# Patient Record
Sex: MALE | Race: OTHER | Employment: FULL TIME | URBAN - METROPOLITAN AREA
[De-identification: names, ages, dates, MRNs, and addresses within clinical notes are randomized per-mention and may not be internally consistent; named-entity substitution may affect disease eponyms.]

---

## 2017-01-24 NOTE — PROGRESS NOTES
1/24/2017  1:13 PM    Trudy Kiser is a 49year old male. Chief complaint(s): Patient presents with: Follow - Up  Diabetes  Lab Results    HPI:     Terrye Sensing primary complaint is regarding increase Cr levels.      Orin Holm a 51year old male pr Vaccine, No Preserv, 3YR +                          12/27/2016      Pneumococcal (Prevnar 13)                          12/19/2016      TDAP                  03/20/2015    Deferred                Date(s) Deferred    Influenza Vaccine, No Preserv, 3YR + constipation. Endocrine: Positive for polydipsia and polyuria. Musculoskeletal: Negative for back pain and neck pain. Skin: Negative for rash. Neurological: Negative for seizures and headaches. Psychiatric/Behavioral: Negative for depressed mood. encounter diagnosis)  Essential hypertension with goal blood pressure less than 140/90     HTN     MEDICATIONS:   Signed Prescriptions Disp Refills    furosemide (LASIX) 20 MG Oral Tab 30 tablet 0      Sig: Take 1 tablet (20 mg total) by mouth daily.

## 2017-02-07 NOTE — PROGRESS NOTES
2/7/2017  1:26 PM    Eleazar Alvarez is a 49year old male. Chief complaint(s): Patient presents with:  Lab Results    HPI:     Eleazar Alvarez primary complaint is regarding FRANKIE.      Mignon Theodore a 51year old male presents for follow up regarding FRANKIE and Administered    Influenza Vaccine, No Preserv, 3YR +                          12/27/2016      Pneumococcal (Prevnar 13)                          12/19/2016      TDAP                  03/20/2015    Deferred                Date(s) Deferred    Influenza Kathryn Beltran constipation. Musculoskeletal: Negative for back pain and neck pain. Skin: Negative for rash. Neurological: Negative for seizures and headaches. Psychiatric/Behavioral: Negative for depressed mood.        PHYSICAL EXAM:   Physical Exam    Constituti Panel (8) [E]  RECOMMENDATIONS given include: avoid pseudoephedrine or other stimulants/decongestants in common cold remedies, decrease consumption of alcohol, perform routine monitoring of blood pressure with home blood pressure cuff, exercise, reduction

## 2017-04-15 NOTE — PROGRESS NOTES
4/15/2017  12:52 PM    Kasey Arias is a 49year old male. Chief complaint(s): Patient presents with:  Diabetes: Recommended F/U. Had recent labs done 03/18/17. Epistaxis: c/o intermittent nose bleed for the past 4 weeks.  Bleeding in small amount last History   Problem Relation Age of Onset   • Diabetes Father      type 2   • Hypertension Father    • Lipids Mother      hyperlipidemia   • Prostate Cancer Maternal Grandfather    • Diabetes Maternal Grandmother      type 2      Social History:   Smoking St ACCU-CHEK BERNY PLUS In Vitro Strip  Disp:  Rfl: 12   Accu-Chek Softclix Lancets Does not apply Misc  Disp:  Rfl: 12       Allergies:  No Known Allergies      ROS:   Review of Systems   Constitutional: Negative for fever, chills and fatigue.    HENT: Sneha Kebede 3. 3-5.1 mmol/L   Chloride 111 (H)  mmol/L   CO2 26 22-32 mmol/L   BUN 44 (H) 8-20 mg/dL   Creatinine 1.88 (H) 0.50-1.50 mg/dL   Calcium, Total 9.2 8.5-10.5 mg/dL   BUN/CREA Ratio 23.4 (H) 10.0-20.0   Anion Gap 6 0-18   Calculated Osmolality 310 (H) 2 Monitoring Suppl (ACCU-CHEK BERNY PLUS) W/DEVICE Does not apply Kit, , Disp: , Rfl: 0  •  ACCU-CHEK BERNY PLUS In Vitro Strip, , Disp: , Rfl: 12  •  Accu-Chek Softclix Lancets Does not apply Misc, , Disp: , Rfl: 12.   No prescriptions requested or ordered i monitoring of blood pressure with home blood pressure cuff, exercise, reduction of dietary salt intake, take medication as prescribed, try not to miss doses, smoking cessation, weight loss, and stress reduction.     FOLLOW-UP: Schedule a follow-up visit in

## 2017-04-17 NOTE — TELEPHONE ENCOUNTER
Please call patient and inform him that I recommended to see nephrologist Dr Carlie Harmon to his abnl renal insuffiencey.  Referral generated

## 2017-04-21 NOTE — PROGRESS NOTES
Quick Note:    Please call patient, just confirm with patient that he has made an appointment to see the nephrologist  ______

## 2017-04-22 NOTE — TELEPHONE ENCOUNTER
----- Message from Christofer Fernandes MD sent at 4/21/2017  3:17 PM CDT -----  Please call patient, just confirm with patient that he has made an appointment to see the nephrologist

## 2017-04-22 NOTE — TELEPHONE ENCOUNTER
LMTCB. May transfer to [id number] until 12:pm today, and [id number] any time.  Staff please note--pt may never have been told to f/u with nephrology already because it appears Dr Rohith Beckett created an encounter on 4/16 with those instructions but closed it instead of mary

## 2017-04-22 NOTE — TELEPHONE ENCOUNTER
Call transferred by CSS: Patient informed of results (identified name and ) and recommendations in Albanian, as per Dr. Cristal Nageotte result note. Patient voiced understanding and agrees with recommendations. Provided with Dr Susanne Rao' info to call for appt.

## 2017-04-22 NOTE — TELEPHONE ENCOUNTER
JOSE Castillo pt called back and stated that the Dr castro recommended Fariha Vincent will not be back in the office until 3 weeks from now. Pt was given the number to our [facility] Nephrologist he will call them. Thanks

## 2017-04-27 NOTE — PROGRESS NOTES
HPI:    Patient ID: Ronny Cool is a 49year old male. HPI  This 54-year-old male presents to me today as a new patient on consult from [address]   He states that he is here for a diabetic foot evaluation and he has a pain on the bottom of his left f normal.  There is no evidence of edema nor erythema in either of his feet. There is no evidence of neurologic deficit today. Skin texture is good and hair growth is noted on his toes. His nails are normal and properly cared for.   Hygiene is excellent hi

## 2017-04-29 NOTE — PROGRESS NOTES
4/29/2017  9:04 AM    Jamel Henry is a 49year old male. Chief complaint(s): Patient presents with:   Follow - Up: Patient here to f/u on his diabetes and needs refills on his medications  Diabetes  HTN    HPI:     Jamel Henry primary complaint is rega Hypertension Father    • Lipids Mother      hyperlipidemia   • Prostate Cancer Maternal Grandfather    • Diabetes Maternal Grandmother      type 2      Social History:   Smoking Status: Current Some Day Smoker         Packs/Day: 0.50  Years:           Type Respiratory: Negative for cough, shortness of breath and wheezing. Cardiovascular: Negative for chest pain and palpitations. Gastrointestinal: Negative for nausea, vomiting, abdominal pain, diarrhea and constipation.    Endocrine: Positive for polydi -American 41 (L) >=60   -HEMOGLOBIN A1C   Result Value Ref Range   Glycohemoglobin (HgA1c) 7.9 (H) 4.0-6.0 %   -MICROALB/CREAT RATIO, RANDOM URINE   Result Value Ref Range   Creatinine Ur Random 77.7 mg/dL   Microalbumin, Urine 322.0 (H) 0.0-1.8 mg/ each 12      Sig: Use BID        REFERRALS:   Keep appt with nephrologist    RECOMMENDATIONS: instructed in use of glucometer ( check fasting glucose daily), return for training in administering insulin injections, adherence to an 1800 calorie ADA diet, a

## 2017-05-01 NOTE — PATIENT INSTRUCTIONS
1..  Watch salt and sugar    2.. No medicinas por dolor  Except tylenol    3. .  Take furosemide  20mg  Each day for agua    4. . Take lisinopril  20mg each day for blood pressure    5. Next labs  Four weeks    6. See me six weeks      7.   Nice to meet y

## 2017-05-02 NOTE — PROGRESS NOTES
NEPHROLOGY CONSULTATION  Bull Hyde     MRN:  [id number]   Date of Service:  5/1/17     Thank you for the kind referral of Mr. Rukhsana Martinez.  As you know, he is a 25-year-old  male who has been in the [place] for about 20 years, originally from [employer] Pressure 150/85, pulse 61. Height 5 feet 10 inches, weight 251, BMI 36. Skin clear and moist. Pupils equal and reactive. Sclerae anicteric. Neck supple, no adenopathy, 2+ carotids without bruits. Heart regular rate and rhythm, S4 present, no S3 or murmurs. referral of Candy Mcnulty. I will do my best job to keep him with stable renal function.      Dictated portions dictated on 5/2/17     Cc: Letter to Dr. Christofer Fernandes

## 2017-06-14 NOTE — PROGRESS NOTES
NEPHROLOGY PROGRESS NOTE  Rosa Dominguez     MRN:  [id number]   Date of Service:  6/12/17     I followed up with Belgica Avila. As you know, he is diabetic with diabetic nephropathy.  He states his sugars are running about 120 to 130 and his blood pressure is b

## 2017-06-24 NOTE — PROGRESS NOTES
6/24/2017  8:50 AM    Belgica Avila is a 49year old male. Chief complaint(s): Patient presents with: Follow - Up  Diabetes    HPI:     Belgica Avila primary complaint is regarding DM/CRI.      Belgica Avila is a 49 yrs old male who has type 2, insulin req hyperlipidemia    • Type II or unspecified type diabetes mellitus without mention of complication, not stated as uncontrolled dx'd in March 2015   • Unspecified essential hypertension       Past Surgical History:  2005: ADENOIDECTOMY  2005: HC IMPLANT EAR Disp: 90 tablet Rfl: 2   furosemide 20 MG Oral Tab Take 1 tablet (20 mg total) by mouth daily. Disp: 30 tablet Rfl: 3   lisinopril 20 MG Oral Tab Take 1 tablet (20 mg total) by mouth daily.  Disp: 30 tablet Rfl: 2   Glucose Blood (ACCU-CHEK BERNY PLUS) In V normal. No rhinorrhea. Mouth/Throat: Oropharynx is clear and moist and mucous membranes are normal.   Eyes: Conjunctivae are normal. No scleral icterus. Neck: Neck supple. Cardiovascular: Normal rate and regular rhythm.     Pulmonary/Chest:   Clear to Rfl: 3  •  Repaglinide (PRANDIN) 1 MG Oral Tab, Take 1 tablet (1 mg total) by mouth 3 (three) times daily before meals. , Disp: 270 tablet, Rfl: 1  •  Linagliptin (TRADJENTA) 5 MG Oral Tab, Take 1 tablet by mouth daily. , Disp: 90 tablet, Rfl: 1  •  AMLODIPI yearly flu shots, and avoid all sodas, juices, candy, chocolates, cakes, ice cream, etc.      FOLLOW-UP: Schedule a follow-up visit in 4 months.        COUNSELING: The patient was counseled concerning the relationship between diabetes control and macrovascu Visit:    Signed Prescriptions Disp Refills    hydrALAzine HCl 50 MG Oral Tab 90 tablet 3      Sig: Take 1 tablet (50 mg total) by mouth 3 (three) times daily.       Repaglinide (PRANDIN) 1 MG Oral Tab 270 tablet 1      Sig: Take 1 tablet (1 mg total) by mo

## 2017-07-06 NOTE — TELEPHONE ENCOUNTER
Pt calling requesting refill states pharmacy asked him to call. Pt is out of meds. Current Outpatient Prescriptions:  Rosuvastatin Calcium (CRESTOR) 20 MG Oral Tab Take 1 tablet (20 mg total) by mouth nightly.  Disp: 90 tablet Rfl: 1`

## 2017-07-06 NOTE — TELEPHONE ENCOUNTER
ROSUVASTATIN, chart reviewed last  2015, next appt in 2 weeks, Medication refilled for 90 days per protocol.   Cholesterol Medications  Protocol Criteria:  · Appointment scheduled in the past 12 months or in the next 3 months  · ALT & LDL on file in

## 2017-07-17 NOTE — TELEPHONE ENCOUNTER
With  Hailey # [id number]    Actions Requested: Doctor Advice - does have appt 7/24/17 for f/u  Problem: Swelling from bilateral knees to bilateral feet   Onset and Timing: Ongoing - had swelling LOV  6/24/17  Associated Symptoms: Bilateral swelling f more swollen  * Cast on leg or ankle and has increasing pain  * Can't walk or can barely stand (new onset)  * Patient sounds very sick or weak to the triager  * Swelling of face, arm or hands (Exception: slight puffiness of fingers during hot weather)  * P

## 2017-07-24 NOTE — PROGRESS NOTES
7/24/2017  1:10 PM    Martell Hutton is a 49year old male. Chief complaint(s): Patient presents with: Follow - Up  Diabetes  Leg Swelling    HPI:     Martell Hutton primary complaint is regarding renal failure, DM.      Martell Hutton is a 49 yrs old male wh Medical History:   Diagnosis Date   • Chicken pox    • Other and unspecified hyperlipidemia    • Type II or unspecified type diabetes mellitus without mention of complication, not stated as uncontrolled dx'd in March 2015   • Unspecified essential hyperten tablet Rfl: 1   Insulin Pen Needle (BD PEN NEEDLE RUFUS U/F) 32G X 4 MM Does not apply Misc Use as directed Disp: 100 each Rfl: 6   Rosuvastatin Calcium (CRESTOR) 20 MG Oral Tab Take 1 tablet (20 mg total) by mouth nightly.  Disp: 90 tablet Rfl: 0   Repaglin °C) (Oral)   Ht 5' 10\" (1.778 m)   Wt 257 lb (116.6 kg)   BMI 36.88 kg/m²    HENT:   Head: Normocephalic. Right Ear: External ear normal.   Left Ear: External ear normal.   Nose: Nose normal. No rhinorrhea.    Mouth/Throat: Oropharynx is clear and moist -    ASSESSMENT/PLAN:   Assessment   Uncontrolled type 2 diabetes mellitus with other diabetic kidney complication (hcc)  (primary encounter diagnosis)  Renal insufficiency  Essential hypertension with goal blood pressure less than 140/90      MEDICATIONS: Please, call our office with any questions or concerns. Notify Dr Joy Thompson or the [facility] if there is a deterioration or worsening of the medical condition. Also, inform the doctor with any new symptoms or medications' side effects.   Fluid restrict

## 2017-07-31 NOTE — PROGRESS NOTES
7/31/2017  1:06 PM    Martell Hutton is a 49year old male. Chief complaint(s): Patient presents with:  Hypertension: follow up   Diabetes: follow up     HPI:     Martell Hutton primary complaint is regarding FRANKIE, HTN.      Suman Castrejon a 51year old male 11/1/2014  Smokeless tobacco: Former User                        Quit date: 5/1/2017  Alcohol use: Yes           0.0 oz/week     Comment: OCC       Immunizations:     Immunization History  Administered            Date(s) Administered    Influenza Vaccine, apply Kit  Disp:  Rfl: 0   Accu-Chek Softclix Lancets Does not apply Misc  Disp:  Rfl: 12       Allergies:  No Known Allergies      ROS:   Review of Systems   Constitutional: Negative for chills, fatigue and fever.    HENT: Negative for rhinorrhea and sore Value Ref Range   Glucose 156 (H) 70 - 99 mg/dL   Sodium 140 136 - 144 mmol/L   Potassium 4.8 3.3 - 5.1 mmol/L   Chloride 111 (H) 95 - 110 mmol/L   CO2 24 22 - 32 mmol/L   BUN 35 (H) 8 - 20 mg/dL   Creatinine 2.10 (H) 0.50 - 1.50 mg/dL   Calcium, Total 9.0 tablet, Rfl: 2  •  Glucose Blood (ACCU-CHEK BERNY PLUS) In Vitro Strip, Use BID, Disp: 100 each, Rfl: 12  •  FUROSEMIDE 20 MG Oral Tab, TAKE 1 TABLET(20 MG) BY MOUTH DAILY, Disp: 30 tablet, Rfl: 0  •  Misc Natural Products (GLUCOSAMINE CHONDROITIN ADV) Gurdeep Pimentel

## 2017-07-31 NOTE — TELEPHONE ENCOUNTER
Pt was seen in the office today. Per Judi Law they received a script for a blood pressure kit and states that they do not fill those request. Judi Law states that it would have to go to a medical supply place. Informed/Shana SALCEDO of this.

## 2017-09-18 RX ORDER — FUROSEMIDE 20 MG/1
TABLET ORAL
Qty: 30 TABLET | Refills: 5 | OUTPATIENT
Start: 2017-09-18

## 2017-09-18 NOTE — PATIENT INSTRUCTIONS
1   HOLD HYDRALAZINE    2. Akshat Peng TAKE LISINIPRIL  20MG EACH MORNING    3. INCREASE FUROSEMIDE TO 2 PILLS EACH AM FOR WATER    4.   GET A BP MACHINE  CHECK BP DAILY AND WRITE DOWN ON PAPER    5. .  SEE ME EIGHT WEEKS.     GOOD TO SEE YOU MADAI

## 2017-09-29 NOTE — TELEPHONE ENCOUNTER
Pt calling requesting refill, [facility] pharmacy asked him to call the office.        Current Outpatient Prescriptions:  ROSUVASTATIN CALCIUM 20 MG Oral Tab TAKE 1 TABLET(20 MG) BY MOUTH EVERY NIGHT Disp: 90 tablet Rfl: 0`

## 2017-09-30 NOTE — TELEPHONE ENCOUNTER
Pt informed Rosuvastatin had been sent by RM to [facility] in [place] yesterday, but if he wants it filled at the [facility]'s he can just inform that [facility]' to fill it as they can see it in the system. Pt voices understanding and agrees.

## 2017-10-05 PROBLEM — I10 ESSENTIAL HYPERTENSION: Status: ACTIVE | Noted: 2017-10-05

## 2017-10-05 PROBLEM — N18.30 CKD STAGE 3 SECONDARY TO DIABETES (HCC): Status: ACTIVE | Noted: 2017-10-05

## 2017-10-05 PROBLEM — E11.22 CKD STAGE 3 SECONDARY TO DIABETES (HCC): Status: ACTIVE | Noted: 2017-10-05

## 2017-10-05 NOTE — TELEPHONE ENCOUNTER
Refilled lasix for 30 day   Rosuvastatin was sent on on 9/28/17. Pharmacy stated did not receive resent with the lasix until next appointment.     Hypertensive Medications  Protocol Criteria:  · Appointment scheduled in the past 6 months or in the next 3 m

## 2017-10-05 NOTE — TELEPHONE ENCOUNTER
Pt calling checking status of refill. Please advise on refill, pt is out of meds. Pt has appt on 10/24 for PX and flu shot.

## 2017-10-09 NOTE — PROGRESS NOTES
NEPHROLOGY PROGRESS NOTE  Luis [facility]     MRN:  [id number]   Date of Service:  9/18/17     I saw Brennan De Leon on 9/18/2017. As you know, he is an obese  male who has hypertension and diabetes. He is feeling well.  His pressures are still somewhat hi

## 2017-10-20 NOTE — TELEPHONE ENCOUNTER
Pt is calling to f/u of refill request. He states he's out and he set up appt with  On 10/24. Please advise. Thank you.

## 2017-10-20 NOTE — TELEPHONE ENCOUNTER
Lisinopril was sent on 10/5/17 to [facility] in [place]. Spoke to [facility] in [address] [place] [address] (requesting pharmacy) and let them know. They state patient already picked up in [address] [place] [address] so we can disregard.

## 2017-10-24 NOTE — PROGRESS NOTES
10/24/2017  9:45 AM    Martell Hutton is a 49year old male. Chief complaint(s): No chief complaint on file. HPI:2     Martell Hutton primary complaint is regarding CPE.      Martell Hutton is a 49year old male is here for routine periodic health screening oz/week     Comment: OCC       Immunizations:     Immunization History  Administered            Date(s) Administered    Influenza Vaccine, No Preserv, 3YR +                          12/27/2016      Pneumococcal (Prevnar 13)                          12/19/2 Glucose Monitoring Suppl (ACCU-CHEK BERNY PLUS) W/DEVICE Does not apply Kit  Disp:  Rfl: 0   Accu-Chek Softclix Lancets Does not apply Misc  Disp:  Rfl: 12       Allergies:  No Known Allergies      ROS:   Review of Systems   Constitutional: Negative for ch confirmed negative in the right inguinal area and confirmed negative in the left inguinal area. Genitourinary: Right testis shows no mass, no swelling and no tenderness. Left testis shows no mass, no swelling and no tenderness.    Musculoskeletal:   Spina athletic conditioning, fluids; low fat milk, limit to less than 20 oz. a day; dental care ), and Healthy habits& Social competence & Responsibilities: Recommendations on physical activity; exercise daily or at least 3 times a week for 30-60 minutes doing c

## 2017-10-25 NOTE — TELEPHONE ENCOUNTER
----- Message from Hedy Humphrey RN sent at 10/25/2017  6:34 AM CDT -----      ----- Message -----  From: Ronnell Carrera MD  Sent: 10/24/2017   6:36 PM  To:  Em Lisa Ashtono Lpmelissa/Catherine    Please call patient,Stop lisinopril

## 2017-10-25 NOTE — TELEPHONE ENCOUNTER
Via  #[phone] spoke with pt and verified pt . Pt instructed to stop Lisinopril and pt verb understanding. Pt is asking if lisinopril will be replaced with another medication. No new medications listed on medication record.   Will cl

## 2017-10-25 NOTE — PROGRESS NOTES
Please notify patient that his/her blood test showed low levels of vitamin D. A prescription was sent to his pharmacy to take one capsule or tablet, once a week. This Rx is good for one year. We'll recheck levels in one year.

## 2017-11-03 NOTE — TELEPHONE ENCOUNTER
Pt calling checking status of refill for two meds.        Current Outpatient Prescriptions:  FUROSEMIDE 20 MG Oral Tab TAKE 1 TABLET(20 MG) BY MOUTH DAILY Disp: 30 tablet Rfl: 0   ROSUVASTATIN CALCIUM 20 MG Oral Tab TAKE 1 TABLET(20 MG) BY MOUTH EVERY NIGHT

## 2017-11-07 NOTE — TELEPHONE ENCOUNTER
Per Dr. Hadley Nava this will be addressed during next appointment scheduled for Tuesday, 11/14/2017.

## 2017-11-20 NOTE — PATIENT INSTRUCTIONS
1  Very good job [address] with blood sugars    2..  Good job on blood pressure  Try to keep around 130/80    3. Increase lisinopril to twice a day    4. Blood test  Four weeks      5  medicines      6. See me February 7.   Happy Thanksgiangie Shaver

## 2017-11-21 NOTE — PROGRESS NOTES
NEPHROLOGY PROGRESS NOTE  Yuri Jama     MRN:  [id number]   Date of Service:    11/20/2017    A followup on Blanca Layne; he has been trying much harder. His blood pressure is running today 149/80 which is somewhat improved. His sugars are running about 120.  Hi

## 2017-12-01 NOTE — PROGRESS NOTES
12/1/2017  12:55 PM    Omero Worrell is a 49year old male. Chief complaint(s): Patient presents with: Follow - Up  Diabetes    HPI:     Omero Worrell primary complaint is regarding CRI, DM.      Omero Worrell is a 49 yrs old male who has type 2, insulin r Other and unspecified hyperlipidemia    • Type II or unspecified type diabetes mellitus without mention of complication, not stated as uncontrolled dx'd in March 2015   • Unspecified essential hypertension       Past Surgical History:  2005: ADENOIDECTOMY Oral Tab TAKE 1 TABLET(20 MG) BY MOUTH EVERY NIGHT Disp: 90 tablet Rfl: 1   Blood Pressure Does not apply Kit Use daily Disp: 1 kit Rfl: 0   Insulin Degludec (TRESIBA FLEXTOUCH) 200 UNIT/ML Subcutaneous Solution Pen-injector Inject 18 Units into the skin d normal. No scleral icterus. Neck: Neck supple. Cardiovascular: Normal rate and regular rhythm. Pulmonary/Chest:   Clear to ascultation bilaterally. Lymphadenopathy:   LEs +2 edema bilateral    Skin: No rash noted.    Psychiatric:   Appropriate affe Eosinophil % 3 %   Basophil % 1 %   Neutrophil Absolute 4.8 1.8 - 7.7 K/UL   Lymphocyte Absolute 1.6 1.0 - 4.0 K/UL   Monocyte Absolute 0.5 0.0 - 1.0 K/UL   Eosinophil Absolute 0.2 0.0 - 0.7 K/UL   Basophil Absolute 0.1 0.0 - 0.2 K/UL     EKG / [place] Disp: , Rfl: 0  •  Accu-Chek Softclix Lancets Does not apply Misc, , Disp: , Rfl: 12.   Signed Prescriptions Disp Refills    AmLODIPine Besylate 10 MG Oral Tab 90 tablet 2      Sig: TAKE 1 TABLET(10 MG) BY MOUTH DAILY      Linagliptin (TRADJENTA) 5 MG Oral pressure cuff, exercise, reduction of dietary salt intake, take medication as prescribed, try not to miss doses, smoking cessation, weight loss, and stress reduction. FOLLOW-UP: Schedule a follow-up visit in 6 weeks.              Orders This Visit:    Or

## 2017-12-18 NOTE — TELEPHONE ENCOUNTER
Vitamin D is not on patient's active medication list, but vitamin D result note dated 10/24/17 states patient was to continue Vitamin D for a year and rx was sent. But rx was discontinued on 11/24/17. Message routed to provider for review.      Please reply

## 2018-01-06 NOTE — PROGRESS NOTES
1/6/2018  8:43 AM    Dewayne Atkinson is a 51year old male. Chief complaint(s): Patient presents with:  Diabetes: Follow up  HTN    HPI:     Dewayne Atkinson primary complaint is regarding CRI/DM.      Dewayne Atkinson is a 49 yrs old male who has type 2, insulin r unspecified hyperlipidemia    • Type II or unspecified type diabetes mellitus without mention of complication, not stated as uncontrolled dx'd in March 2015   • Unspecified essential hypertension       Past Surgical History:  2005: ADENOIDECTOMY  2005: HC 1 tablet (50 mg total) by mouth 3 (three) times daily. Disp: 90 tablet Rfl: 1   AmLODIPine Besylate 10 MG Oral Tab TAKE 1 TABLET(10 MG) BY MOUTH DAILY Disp: 90 tablet Rfl: 2   Linagliptin (TRADJENTA) 5 MG Oral Tab Take 1 tablet by mouth daily.  Disp: 90 tab Patient Position: Sitting, Cuff Size: large)   Pulse 60   Temp 97.9 °F (36.6 °C) (Oral)   Resp 18   Ht 5' 10\" (1.778 m)   Wt 258 lb (117 kg)   BMI 37.02 kg/m²    HENT:   Head: Normocephalic.    Right Ear: External ear normal.   Left Ear: External ear manjinder NIGHT, Disp: 90 tablet, Rfl: 1  •  TRADJENTA 5 MG Oral Tab, TAKE 1 TABLET BY MOUTH DAILY, Disp: 90 tablet, Rfl: 0  •  ergocalciferol 39371 units Oral Cap, Take 1 capsule (50,000 Units total) by mouth once a week., Disp: 12 capsule, Rfl: 4  •  hydrALAzine H quarterly, daily foot self-inspection, need for yearly flu shots, and avoid all sodas, juices, candy, chocolates, cakes, ice cream, etc.    Referral: f/u with nephrologist.  FOLLOW-UP: Schedule a follow-up visit in 2 months.        COUNSELING: The patient w

## 2018-01-07 NOTE — PROGRESS NOTES
Please call patient, results are within normal limits. Doing okay, a little more diet restriction. Renal function  Little better.  CPM, KPA

## 2018-01-08 NOTE — TELEPHONE ENCOUNTER
----- Message from Geronimo Thomson MD sent at 1/7/2018 12:46 PM CST -----  Please call patient, results are within normal limits. Doing okay, a little more diet restriction. Renal function  Little better.  CPM, KPA

## 2018-01-27 NOTE — TELEPHONE ENCOUNTER
Left message labs are im proved. Ritesh cardenas. See me 3 m onths. . Please call pt , I did leave message  though

## 2018-01-29 NOTE — TELEPHONE ENCOUNTER
Contacted pt. Notified him that his labs have improved and to see [address] [facility] in 3 months. He states he has an appt already on 2/26/18 and will keep this appt.

## 2018-02-26 NOTE — PROGRESS NOTES
Dear dr Fredy Maloney is doing quite well. Blood pressures are running about 140/80 home and sugars are running about 130.   His edema is down he is currently on Crestor twice daily hydralazine 50 3 times daily Norvasc 10 per day Tradjenta insulin Pr

## 2018-03-03 NOTE — PROGRESS NOTES
3/3/2018  9:05 AM    Logan oLzano is a 51year old male. Chief complaint(s): Patient presents with: Follow - Up  Diabetes  Shoulder Pain: right shoulder pain x 1 week    HPI:     Logan Lozano primary complaint is regarding as above.      Logan Lozano is patient complains of having some right shoulder pain and right hand numbness that has been going on for the past week symptoms are mild in severity. There is been no trauma or injuries. Presently asymptomatic.     HISTORY:  Past Medical History:   Diagnos TABLET(10 MG) BY MOUTH DAILY Disp: 90 tablet Rfl: 2   furosemide 20 MG Oral Tab TAKE 1 TABLET(20 MG) BY MOUTH TWICE DAILY Disp: 180 tablet Rfl: 1   hydrALAzine HCl 50 MG Oral Tab Take 1 tablet (50 mg total) by mouth 3 (three) times daily.  Disp: 90 tablet R (BP Location: Right arm, Patient Position: Sitting, Cuff Size: adult)   Pulse 66   Temp 97.6 °F (36.4 °C) (Oral)   Ht 5' 10\" (1.778 m)   Wt 260 lb (117.9 kg)   BMI 37.31 kg/m²    HENT:   Head: Normocephalic.    Right Ear: External ear normal.   Left Ear: E Assessment   Type 2 diabetes mellitus with diabetic nephropathy, without long-term current use of insulin (hcc)  (primary encounter diagnosis)  Renal insufficiency  Strain of right shoulder, initial encounter    1.  Type 2 diabetes mellitus with diabetic DAILY      furosemide 20 MG Oral Tab 180 tablet 1      Sig: TAKE 1 TABLET(20 MG) BY MOUTH TWICE DAILY      hydrALAzine HCl 50 MG Oral Tab 90 tablet 1      Sig: Take 1 tablet (50 mg total) by mouth 3 (three) times daily.           RECOMMENDATIONS: instructed motion exercises. Return to clinic if symptoms get worse or do not improve in the next 2 weeks.          Orders This Visit:    Orders Placed This Encounter      ** Hemoglobin A1C  [E]      **CMP  Comp Metabolic Panel (14) [E]      ** Hemoglobin A1C  [E]

## 2018-03-05 NOTE — PROGRESS NOTES
Phone call made, no answer, left message to call back. Increased abnl renal test and uncontrol diabetes make sure he is taking all of his meds and follow a low carb diet.

## 2018-03-05 NOTE — TELEPHONE ENCOUNTER
----- Message from Alec Davis MD sent at 3/5/2018  8:46 AM CST -----  Phone call made, no answer, left message to call back. Increased abnl renal test and uncontrol diabetes make sure he is taking all of his meds and follow a low carb diet.

## 2018-03-06 NOTE — TELEPHONE ENCOUNTER
LMTCB on  h#...       Future Appointments  Date Time Provider Holly Mike   5/12/2018 10:15 AM Nas Marie MD [facility] AD   6/29/2018 3:40 PM Roxie Richardson MD [facility] Christa [place]

## 2018-03-06 NOTE — TELEPHONE ENCOUNTER
LMTCB on  H#. Ramiro Caceres     Future Appointments  Date Time Provider Holly Cee   5/12/2018 10:15 AM Joanna Gil MD [facility] AD   6/29/2018 3:40 PM Abdoul Orozco MD [facility] Christa [place]

## 2018-03-10 NOTE — TELEPHONE ENCOUNTER
Pt contacted and is agreeable to plan. Stts he takes all the meds daily.   Discussed carb foods to reduce

## 2018-05-06 NOTE — PROGRESS NOTES
Please call patient, results much better diabetes control continue with tight 1800 ADA diet and continue taking all his medications. Keep present appointment with me.

## 2018-05-12 NOTE — PROGRESS NOTES
5/12/2018  9:01 AM    Errol Foster is a 51year old male. Chief complaint(s): Patient presents with:  Diabetes: follow up     HPI:     Errol Foster primary complaint is regarding DM.      Kasey Arias is a 47yrs old male who has type 2, insulin requi pain for the past 2 months. It comes and goes mostly when he does repetitive work and is mostly located on the posterior side of the shoulder. There is no paresthesia or weakness. There is no swelling and no radiation of pain.   Furthermore he also compl 12/19/2016      Medications (Active prior to today's visit):    Current Outpatient Prescriptions:  Econazole Nitrate 1 % External Cream Apply to affected area(s) BID. Disp: 30 g Rfl: 1   Linagliptin (TRADJENTA) 5 MG Oral Tab Take 1 tablet by mouth daily.  D Toes infection   Neurological: Negative for seizures and headaches. Psychiatric/Behavioral: Negative for depressed mood. PHYSICAL EXAM:   Physical Exam    Constitutional: He appears well-developed and well-nourished.    /75 (BP Location: Right Radiology: No results found. -    ASSESSMENT/PLAN:   Assessment   Type 2 diabetes mellitus with diabetic nephropathy, without long-term current use of insulin (hcc)  (primary encounter diagnosis)  Renal insufficiency  Tinea pedis of left foot  Strain o Accu-Chek Softclix Lancets Does not apply Misc, , Disp: , Rfl: 12. Signed Prescriptions Disp Refills    Econazole Nitrate 1 % External Cream 30 g 1      Sig: Apply to affected area(s) BID.       Linagliptin (TRADJENTA) 5 MG Oral Tab 90 tablet 0      Sig: T Nitrate 1 % External Cream 30 g 1      Sig: Apply to affected area(s) BID. Linagliptin (TRADJENTA) 5 MG Oral Tab 90 tablet 0      Sig: Take 1 tablet by mouth daily.       Rosuvastatin Calcium 20 MG Oral Tab 90 tablet 1      Sig: TAKE 1 TABLET(20 MG) BY office with any questions or concerns. Notify Dr Rebecca Riggins or the [facility] if there is a deterioration or worsening of the medical condition. Also, inform the doctor with any new symptoms or medications' side effects.       FOLLOW-UP: Schedule a follo

## 2018-08-24 NOTE — PATIENT INSTRUCTIONS
ADD LISINOPRILL 20MG   TWICE A DAY    ON FUROSEMIDE   2  AM    1 PM    INCREASE INSULIN TO 26      DO LABS SIX WEEKS AND CALL ME    KEEP BLOOD PRESSURE UNDER 135/80    GOOD JOB MADAI

## 2018-08-24 NOTE — TELEPHONE ENCOUNTER
Patient said Dr. Beryl Sandoval has the papers. We can fax them to Rohith Rothman at [phone] when Dr. Beryl Sandoval has finished using them. Please advise.  ([facility] office was closed)

## 2018-08-27 NOTE — TELEPHONE ENCOUNTER
Spoke to patient. He is going to bring by paperwork for disability/ FMLA (he is not sure). Informed patient we would need a signed [employer] release and that clinic charges for form completion. Hours of [facility], Monday through Friday 8-4 given.

## 2018-08-29 NOTE — TELEPHONE ENCOUNTER
Refill Protocol Appointment Criteria  · Appointment scheduled in the past 12 months or in the next 3 months  Recent Outpatient Visits            4 days ago CKD stage 4 due to type 2 diabetes mellitus [facility])    [facility] [place], [address] [place] [address]

## 2018-09-07 NOTE — PROGRESS NOTES
Noted, thanks for the info. In the past his renal function get worse with ACE's and ARBs and improved when take off.   ROSEMARY

## 2018-09-07 NOTE — PROGRESS NOTES
Blood work BUN and creatinine are 39 and 2.84 this is about stable for his BUN and creatinine possibly slightly worse.   His K is good his bicarb is good microalbumin is 6.7 g he has been nephrotic A1c is 8.3% which is worse at 7.9 in the past.  Renal ultra

## 2018-09-15 DIAGNOSIS — E11.21 TYPE 2 DIABETES MELLITUS WITH DIABETIC NEPHROPATHY, WITHOUT LONG-TERM CURRENT USE OF INSULIN (HCC): ICD-10-CM

## 2018-09-15 RX ORDER — NAPROXEN 500 MG/1
TABLET ORAL
Qty: 180 TABLET | Refills: 0 | OUTPATIENT
Start: 2018-09-15

## 2018-09-15 NOTE — PROGRESS NOTES
9/15/2018  8:43 AM    Carrillo Villegas is a 51year old male. Chief complaint(s): Patient presents with: Follow - Up  Diabetes    HPI:     Carrillo Villegas primary complaint is regarding DM, CRI.      Tia Cueto is a 47yrs old male who has type 2, insulin Chicken pox    • Other and unspecified hyperlipidemia    • Type II or unspecified type diabetes mellitus without mention of complication, not stated as uncontrolled dx'd in March 2015   • Unspecified essential hypertension       Past Surgical History:  200 with meals. Disp: 60 tablet Rfl: 0   AmLODIPine Besylate 10 MG Oral Tab TAKE 1 TABLET(10 MG) BY MOUTH DAILY Disp: 90 tablet Rfl: 2   Insulin Degludec (TRESIBA FLEXTOUCH) 200 UNIT/ML Subcutaneous Solution Pen-injector Inject 18 Units into the skin daily.  Felicita Weinberg Exam    Constitutional: He appears well-developed and well-nourished.    /88 (BP Location: Right arm, Patient Position: Sitting, Cuff Size: large)   Pulse 67   Temp 98 °F (36.7 °C) (Oral)   Wt 264 lb (119.7 kg)   BMI 37.88 kg/m²    HENT:   Head: Normo Ratio 13.7 10.0 - 20.0    Calculated Osmolality 302 (H) 275 - 295 mOsm/kg    GFR, Non- 25 (L) >=60    GFR, -American 29 (L) >=60    FASTING Yes      EKG / Spirometry : -     Radiology: No results found. -    ASSESSMENT/PLAN:   Assess TAKE 1 TABLET(20 MG) BY MOUTH TWICE DAILY, Disp: 180 tablet, Rfl: 1  •  lisinopril 20 MG Oral Tab, Take 1 tablet (20 mg total) by mouth 2 (two) times daily. , Disp: 60 tablet, Rfl: 3  •  HYDRALAZINE HCL 50 MG Oral Tab, TAKE 1 TABLET(50 MG) BY MOUTH THREE TI cerebrovascular and peripheral vascular disease. The patient was counseled concerning the relationship between diabetes control and retinopathy, nephropathy, and neuropathy.  Advised as to the targets of pre-meal glucoses ( mg/dl) and post meal glucos months.          Orders This Visit:  Orders Placed This Encounter      ** Hemoglobin A1C  [E]      ** Microalbumin [id number]      **CMP  Comp Metabolic Panel (14) [E]      Meds This Visit:  Requested Prescriptions     Signed Prescriptions Disp Refills   • Sean Melgar

## 2018-11-05 NOTE — PROGRESS NOTES
Please call patient, the following results are the same renal insuffiencey follow up with nephrologist.

## 2018-11-06 NOTE — PROGRESS NOTES
Spoke with patient (identified name and ), results reviewed and agrees with plan.   Patient agreed to make appt with Dr Hemant Thakkar, nephrology

## 2018-11-10 NOTE — PROGRESS NOTES
11/10/2018  9:17 AM    Saida Nunn is a 51year old male. Chief complaint(s): Patient presents with:  Diabetes: Recommended F/U for type 2 Diabetes. HPI:     Saida Nunn primary complaint is regarding CRI & DM.      Marlyn Dias is a 47yrs old Medical History:   Diagnosis Date   • Chicken pox    • Other and unspecified hyperlipidemia    • Type II or unspecified type diabetes mellitus without mention of complication, not stated as uncontrolled dx'd in March 2015   • Unspecified essential hyperten tablet by mouth daily. Disp: 90 tablet Rfl: 0   Rosuvastatin Calcium 20 MG Oral Tab TAKE 1 TABLET(20 MG) BY MOUTH EVERY NIGHT Disp: 90 tablet Rfl: 1   naproxen 500 MG Oral Tab Take 1 tablet (500 mg total) by mouth 2 (two) times daily with meals.  Disp: 60 t depressed mood. PHYSICAL EXAM:   Physical Exam    Constitutional: He appears well-developed and well-nourished.    /83 (BP Location: Left arm, Patient Position: Sitting, Cuff Size: adult)   Pulse 59   Temp 98.2 °F (36.8 °C) (Oral)   Ht 5' 10\" ( Calculated Osmolality 300 (H) 275 - 295 mOsm/kg    GFR, Non- 25 (L) >=60    GFR, -American 29 (L) >=60    FASTING No        EKG / Spirometry : -     Radiology: No results found.      ASSESSMENT/PLAN:   Assessment   Chronic renal insuf Blood (ACCU-CHEK BERNY PLUS) In Vitro Strip, Use BID, Disp: 100 each, Rfl: 12  •  Blood Glucose Monitoring Suppl (ACCU-CHEK BERNY PLUS) W/DEVICE Does not apply Kit, , Disp: , Rfl: 0  •  Accu-Chek Softclix Lancets Does not apply Misc, , Disp: , Rfl: 12  • Misc, USE AS DIRECTED, Disp: 100 each, Rfl: 0  •  furosemide 20 MG Oral Tab, Take 1 tablet (20 mg total) by mouth 3 (three) times daily.  TAKE 1 TABLET(20 MG) BY MOUTH TWICE DAILY, Disp: 180 tablet, Rfl: 1  •  HYDRALAZINE HCL 50 MG Oral Tab, TAKE 1 TABLET(5 to ADA and <6.5% according to AACE were discussed.         3. Essential hypertension with goal blood pressure less than 140/90  Restart Lisinopril     MEDICATIONS:   Requested Prescriptions     Signed Prescriptions Disp Refills   • lisinopril 20 MG Oral Tab

## 2018-11-19 ENCOUNTER — TELEPHONE (OUTPATIENT)
Dept: FAMILY MEDICINE CLINIC | Facility: CLINIC | Age: 48
End: 2018-11-19

## 2018-12-18 NOTE — TELEPHONE ENCOUNTER
Please see msg below. . Patient is calling to check up on status. Please advise    Michael Burton RN 3 days ago         Patient failed protocol. Script pended.  Please advise.            Documentation

## 2019-01-07 NOTE — PATIENT INSTRUCTIONS
Stop hydralazine    Add patch   Place new patch once a week on arm    Do labs two weeks      See dr Sonido Ortiz   Interventional radiology  [phone]      Call me a few weeks     nice to see you Kathy Phan      Try to keep bp under 140/80

## 2019-01-08 NOTE — TELEPHONE ENCOUNTER
Tammy from Dr. Puja Bourgeois office called. Pt contacted them to schedule an appointment, but he couldn't really explain why [facility] referred him and [facility] notes aren't finished yet so they aren't able to tell. Dr. Kyle Carvalho, why is patient seeing Dr. Lana Neves?      Call [facility]

## 2019-01-09 NOTE — TELEPHONE ENCOUNTER
Tammy at Dr. Michelle Earth office notified & I also let her know about the renal US patient had done in [facility] that showed possible renal artery stenosis. This report is [place] [facility] [place] It was sent to scanning so it should be in pt's chart in a few days.

## 2019-01-09 NOTE — TELEPHONE ENCOUNTER
Tammy called. Since renal US report is in [facility], she thinks it would be good for Dr. Yovanny Leiva to speak to either Dr. Alexander Norris or Reid Bueno to discuss further.  Please call at ext [phone]

## 2019-01-16 NOTE — PLAN OF CARE
Dominique Earing  [id number]  01/02/1970  47year old        You are scheduled to have a Renal angiogram  · Do NOT eat or drink anything after Midnight  · Use Betasept/ Hibiclens soap for 3 consecutive days.  Instructions below.     ARRIVAL:  · Please make ernie

## 2019-01-19 NOTE — PROGRESS NOTES
1/19/2019  9:10 AM    Manon Gilford is a 47year old male. Chief complaint(s): Patient presents with:  Diabetes    HPI:     Manon Gilford primary complaint is regarding DM, HTN, CRI.      Jamel Henry is a 47 yrs old male who has type 2, insulin requir • Type II or unspecified type diabetes mellitus without mention of complication, not stated as uncontrolled dx'd in March 2015   • Unspecified essential hypertension       Past Surgical History:   Procedure Laterality Date   • ADENOIDECTOMY  2005   • [facility]. once daily. Disp: 90 tablet Rfl: 2   furosemide 20 MG Oral Tab Take 1 tablet (20 mg total) by mouth 2 (two) times daily.  Disp: 60 tablet Rfl: 2   hydrALAzine HCl 50 MG Oral Tab TAKE 1 TABLET(50 MG) BY MOUTH THREE TIMES DAILY Disp: 270 tablet Rfl: 0   Repag Temp 98.3 °F (36.8 °C) (Oral)   Ht 5' 10\" (1.778 m)   Wt 246 lb (111.6 kg)   BMI 35.30 kg/m²    HENT:   Head: Normocephalic. Right Ear: External ear normal.   Left Ear: External ear normal.   Nose: Nose normal. No rhinorrhea.    Mouth/Throat: Oropharynx Type 2 diabetes mellitus with diabetic nephropathy, without long-term current use of insulin (hcc)  Essential hypertension with goal blood pressure less than 140/90  (primary encounter diagnosis)  Chronic renal insufficiency, stage 3 (moderate) ([facility]) 0  •  Glucose Blood (ACCU-CHEK BERNY PLUS) In Vitro Strip, Use BID, Disp: 100 each, Rfl: 12  •  Blood Glucose Monitoring Suppl (ACCU-CHEK BERNY PLUS) W/DEVICE Does not apply Kit, , Disp: , Rfl: 0  •  Accu-Chek Softclix Lancets Does not apply Misc, , Disp: testing discussed. The A1c target of <7% according to ADA and <6.5% according to AACE were discussed. 2. Essential hypertension with goal blood pressure less than 140/90  3.  Chronic renal insufficiency, stage 3 (moderate) (HCC)   HTN     MEDICATIONS MG) BY MOUTH DAILY   • Rosuvastatin Calcium 20 MG Oral Tab 90 tablet 1     Sig: TAKE 1 TABLET(20 MG) BY MOUTH EVERY NIGHT   • Linagliptin (TRADJENTA) 5 MG Oral Tab 90 tablet 2     Sig: Take 1 tablet (5 mg total) by mouth once daily.    • furosemide 20 MG Or

## 2019-02-01 NOTE — INTERVAL H&P NOTE
The above referenced H&P was reviewed by Candido Ramirez MD on 2/1/2019, the patient was examined and no significant changes have occurred in the patient's condition since the H&P was performed.   Risks, benefits, alternative treatments and consequences of no

## 2019-02-08 NOTE — PROGRESS NOTES
2/8/2019  11:03 AM    Adwoa Mesa is a 47year old male. Chief complaint(s): Patient presents with: Follow - Up: patient here to f/u after surgery    HPI:     Adwoa Mesa primary complaint is regarding multiple issues.      Stef Smoker a 80 KCV Former Smoker        Packs/day: 0.50        Types: Cigarettes        Quit date: 2014        Years since quittin.2      Smokeless tobacco: Never Used    Alcohol use:  Yes      Alcohol/week: 0.0 oz      Comment: OCC    Drug use: No       Immunization (BD PEN NEEDLE RUFUS U/F) 32G X 4 MM Does not apply Misc Use daily Disp: 100 each Rfl: 1   BD PEN NEEDLE RUFUS U/F 32G X 4 MM Does not apply Misc USE AS DIRECTED Disp: 100 each Rfl: 0   Blood Pressure Does not apply Kit Use daily Disp: 1 kit Rfl: 0   Glucose Shanelle Lynch   Results for orders placed or performed during the hospital encounter of 22/15/82   BASIC METABOLIC PANEL (8)   Result Value Ref Range    Glucose 89 70 - 99 mg/dL    Sodium 144 136 - 144 mmol/L    Potassium 4.8 3 interventional radiology suite and placed in a supine position on the fluoroscopic imaging table. The right groin was prepped and draped in sterile fashion. 2% lidocaine was administered for local anesthesia.  Under real-time sonographic guidance, the right Take 1 tablet (20 mg total) by mouth daily. , Disp: 30 tablet, Rfl: 3  •  AmLODIPine Besylate 10 MG Oral Tab, TAKE 1 TABLET(10 MG) BY MOUTH DAILY, Disp: 90 tablet, Rfl: 2  •  Rosuvastatin Calcium 20 MG Oral Tab, TAKE 1 TABLET(20 MG) BY MOUTH EVERY NIGHT, Mg Lehman medications' side effects. FOLLOW-UP: Schedule a follow-up visit in 3 months.           2. Essential hypertension with goal blood pressure less than 140/90   HTN     MEDICATIONS:  CPM  LABORATORY & ORDERS: RFT  RECOMMENDATIONS given include: avoid pseu encounter. Meds This Visit:  Requested Prescriptions     Signed Prescriptions Disp Refills   • Phentermine HCl 37.5 MG Oral Cap 30 capsule 0     Sig: Take 1 capsule (37.5 mg total) by mouth every morning.        Imaging & Referrals:  None         JOHN

## 2019-02-15 NOTE — PROGRESS NOTES
Dear anil   just a follow up on Wayna Curling    as you know he is a [de-identified]  18-year-old who just came back from [facility].  When in [facility] he did an ultrasound of his kidneys which showed possible renal artery stenosis  However I had him see Dr. Sandra Ramirez int

## 2019-02-15 NOTE — TELEPHONE ENCOUNTER
JARREDTCKRISHNA. PSRs---if patient returns call please assist him to schedule a follow up visit with Dr. Frieda Sicard in March. Ok to use any available open time slot. Thanks.

## 2019-02-21 NOTE — TELEPHONE ENCOUNTER
Patient contacted. Appointment booked for Friday 3/1/19 at 4:40 pm per Dr. Castañeda Letters. Patient is off work on Fridays.

## 2019-03-08 NOTE — PROGRESS NOTES
3/8/2019  9:46 AM    Wilma Yeung is a 47year old male. Chief complaint(s): Patient presents with:  Diabetes: routine f/u on diabetes. Medication Request: requesting refills on meds.  Meds pended    HPI:     iWlma Yeung primary complaint is regar over weight.  Requesting treatment to help him loss weihgt    HISTORY:  Past Medical History:   Diagnosis Date   • Chicken pox    • High blood pressure    • High cholesterol    • Other and unspecified hyperlipidemia    • Renal disorder    • Type II or unspe (20 mg total) by mouth 2 (two) times daily.  Disp: 60 tablet Rfl: 2   Rosuvastatin Calcium 20 MG Oral Tab TAKE 1 TABLET(20 MG) BY MOUTH EVERY NIGHT Disp: 90 tablet Rfl: 1   linagliptin (TRADJENTA) 5 mg Oral Tab Take 1 tablet (5 mg total) by mouth once daily Known Allergies      ROS:   Review of Systems   Constitutional: Positive for unexpected weight change (overweight). Negative for chills, fatigue and fever. HENT: Negative for rhinorrhea and sore throat.     Respiratory: Negative for cough, shortness of br diabetic nephropathy, without long-term current use of insulin (HCC)  2. Class 2 severe obesity due to excess calories with serious comorbidity and body mass index (BMI) of 35.0 to 35.9 in adult [facility])    MEDICATIONS:   Requested Prescriptions     Signed Pr temporary change, but a life time, life style change. Will need to stop Tradjenta and later possibly decrease Prandin    FOLLOW-UP: Schedule a follow-up visit in 1 month. 3. Chronic renal insufficiency, stage 3 (moderate) (HCC)  4.  Essential hypertens Imaging & Referrals:  None         Fermin Ervin MD

## 2019-05-13 NOTE — TELEPHONE ENCOUNTER
PA for Victoza 18 mg/3 ml subcutaneous solution pen injector completed with [employer] via Critical access hospital response time 3-5 business days [id number] [facility].

## 2019-05-13 NOTE — TELEPHONE ENCOUNTER
1. Go to key. [employer]  2. Enter Key: [id number]      Last Name: Ryan Risk      : 1970  3. Complete and send to plan.

## 2019-05-14 NOTE — TELEPHONE ENCOUNTER
Received fax from [place], [place] not required for this medication. Called [facility] and spoke with [employer], confirmed that script was successfully processed for patient.

## 2019-05-17 NOTE — PROGRESS NOTES
5/17/2019  9:52 AM    Zach St is a 47year old male. Chief complaint(s): Patient presents with:  Diabetes: f/u  Medication Follow-Up  CRI  HTN  Callus left foot pain    HPI:     Zach St primary complaint is regarding multiple complaints. drinking lots of water. Saw nephrologist about a month ago. Notice having UEs tremors during the examination. Patient did not voice it until asked about it. Reports not having until recently.     HISTORY:  Past Medical History:   Diagnosis Date   • Chick Medications (Active prior to today's visit):    Current Outpatient Medications:  Econazole Nitrate 1 % External Cream Apply to affected area(s) BID.  Disp: 30 g Rfl: 1   hydrALAzine HCl 50 MG Oral Tab TAKE 1 TABLET(50 MG) BY MOUTH THREE TIMES DAILY Disp Negative for shortness of breath. Cardiovascular: Positive for leg swelling. Negative for chest pain and palpitations. Gastrointestinal: Negative for nausea, vomiting, abdominal pain, diarrhea and constipation.    Endocrine: Negative for polydipsia and 37.0 g/dL    RDW 14.0 11.0 - 15.0 %    RDW-SD 46.2 35.1 - 46.3 fL   COMPLEMENT C3, SERUM   Result Value Ref Range    Complement C3 115.0 90.0 - 180.0 mg/dL   COMPLEMENT C4, SERUM   Result Value Ref Range    Complement C4 31.8 10.0 - 40.0 mg/dL   MPO/RI-3 A Value Ref Range    AMBREEN Screen With Reflex Positive (A) Negative   ANTI-DSDNA ANTIBODIES   Result Value Ref Range    Anti Double Strand DNA <10 <10   HEP B CORE AB, TOT   Result Value Ref Range    Hepatitis B Core Ab,Total Nonreactive  Nonreactive    HIV AG hypertension  Tremor  Foot callus    1.  Type 2 diabetes mellitus with diabetic nephropathy, without long-term current use of insulin (HCC)  2. Class 2 severe obesity due to excess calories with serious comorbidity and body mass index (BMI) of 35.0 to 35.9 Kit, Use daily, Disp: 1 kit, Rfl: 0  •  Glucose Blood (ACCU-CHEK BERNY PLUS) In Vitro Strip, Use BID, Disp: 100 each, Rfl: 12  •  Blood Glucose Monitoring Suppl (ACCU-CHEK BERNY PLUS) W/DEVICE Does not apply Kit, , Disp: , Rfl: 0  •  Accu-Chek Softclix Sandee Lush discussed. The A1c target of <7% according to ADA and <6.5% according to AACE were discussed. 3. Chronic renal insufficiency, stage 3 (moderate) (HCC)  4.  Essential hypertension   HTN     MEDICATIONS:   Requested Prescriptions     Signed Prescriptio effects. FOLLOW-UP: Schedule a follow-up visit in  prn.      6. Foot callus    REFERRALS: NEURO - INTERNAL  PODIATRY - INTERNAL, Orders Placed This Encounter          Podiatry Luis Cervantes DPM       RECOMMENDATIONS given include: Please, call our o

## 2019-06-03 NOTE — PROGRESS NOTES
Neurology Initial Visit     Referred By: Dr. Rae ref. provider found    Chief Complaint: Patient presents with:  Tremors: Patient presents today c/o tremors in his hands.  He states that his PCP noticed the tremors but he does not pay much attention to it type 2   • Hypertension Father    • Lipids Mother         hyperlipidemia   • Prostate Cancer Maternal Grandfather    • Diabetes Maternal Grandmother         type 2         Current Outpatient Medications:   •  Econazole Nitrate 1 % External Cream, Ap Allergies    ROS:   As in HPI, the rest of the 14 system review was done and was negative      Physical Exam:   06/03/19  1501   BP: 126/72   Pulse: 72   Weight: 220 lb   Height: 70\"       General: No apparent distress, well nourished, well groomed.   Head Lab Results   Component Value Date    HDL 44 10/24/2017    LDL 93 10/24/2017    TRIG 114 10/24/2017     Lab Results   Component Value Date    HGB 13.8 05/17/2019    HCT 43.4 05/17/2019    MCV 92.1 05/17/2019    WBC 8.5 05/17/2019    .0 05/17/201

## 2019-06-04 NOTE — TELEPHONE ENCOUNTER
----- Message from Radha Hardin MD sent at 6/4/2019  7:55 AM CDT -----  Please let the patient know that results of these particular lab tests so far were normal.    Thank you

## 2019-06-07 NOTE — PROGRESS NOTES
HPI:    Patient ID: Emma Chung is a 47year old male. This 27-year-old male presents as a new patient to me on consult from [address]   This patient states that he is here because of diabetes but he has a callus on the ball of both feet that is a co skin daily.  Disp: 6 pen Rfl: 2   BD PEN NEEDLE RUFUS U/F 32G X 4 MM Does not apply Misc USE AS DIRECTED Disp: 100 each Rfl: 0   Blood Pressure Does not apply Kit Use daily Disp: 1 kit Rfl: 0   Glucose Blood (ACCU-CHEK BERNY PLUS) In Vitro Strip Use BID Disp

## 2019-06-17 NOTE — TELEPHONE ENCOUNTER
Refill passed per [facility], [facility] protocol.   Diabetic Supplies  Protocol Criteria:  · Appointment scheduled in past 12 months or the next 3 months    Refill Protocol Appointment Criteria  · Appointment scheduled in the past 12 months or in the next 3 month

## 2019-07-12 NOTE — PROGRESS NOTES
7/12/2019  9:17 AM    Bonilla Campos is a 47year old male. Chief complaint(s): Patient presents with:  Diabetes: follow up  HTN / CRF  HPI:     Bonilla Campos primary complaint is regarding as abive.      Hedy Vicente is a 47 yrs old male who has type 2 month ago.     HISTORY:  Past Medical History:   Diagnosis Date   • Chicken pox    • High blood pressure    • High cholesterol    • Other and unspecified hyperlipidemia    • Renal disorder    • Type II or unspecified type diabetes mellitus without mention o furosemide 20 MG Oral Tab Take 1 tablet (20 mg total) by mouth 2 (two) times daily.  Disp: 60 tablet Rfl: 2   hydrALAzine HCl 50 MG Oral Tab TAKE 1 TABLET(50 MG) BY MOUTH THREE TIMES DAILY Disp: 270 tablet Rfl: 1   Insulin Pen Needle (BD PEN NEEDLE RUFUS U Neurological: Negative for headaches. Psychiatric/Behavioral: Negative for depressed mood. PHYSICAL EXAM:   Physical Exam    Constitutional: He appears well-developed and well-nourished.    BP (!) 165/79   Pulse 59   Temp 98.1 °F (36.7 °C) (Oral) EVERY NIGHT, Disp: 90 tablet, Rfl: 1  •  furosemide 20 MG Oral Tab, Take 1 tablet (20 mg total) by mouth 2 (two) times daily. , Disp: 60 tablet, Rfl: 2  •  hydrALAzine HCl 50 MG Oral Tab, TAKE 1 TABLET(50 MG) BY MOUTH THREE TIMES DAILY, Disp: 270 tablet, Rf 50 MG Oral Tab 270 tablet 1     Sig: TAKE 1 TABLET(50 MG) BY MOUTH THREE TIMES DAILY      REFERRALS: Follow up with Neurologist    RECOMMENDATIONS: instructed in use of glucometer ( check fasting glucose daily), return for training in administering insulin (20 mg total) by mouth 2 (two) times daily.    • hydrALAzine HCl 50 MG Oral Tab 270 tablet 1     Sig: TAKE 1 TABLET(50 MG) BY MOUTH THREE TIMES DAILY       Imaging & Referrals:  None         Gonzalo Avalos MD

## 2019-07-22 NOTE — TELEPHONE ENCOUNTER
Advised patient of Dr Emma Johnson  note. Patient verbalized that it costs $1000 and a coupon is not really the answer. Asking is there an alternative treatment that would be less costly?     Please advise

## 2019-07-22 NOTE — TELEPHONE ENCOUNTER
Pt states that his insurance does not cover his tradjenta medication and would like to know if the doctor has samples in the office to give him. Per pt he is out of medication.        Current Outpatient Medications:  linagliptin (TRADJENTA) 5 mg Oral Tab T

## 2019-07-23 NOTE — TELEPHONE ENCOUNTER
Increase lantus by one units every night when morning glucose is > 150 , until next appt with me. Let him know he will gain weight this is why Maria L Mark is important.

## 2019-07-23 NOTE — TELEPHONE ENCOUNTER
Advised patient of Dr. Ladi Cheney note. Patient verbalized understanding. Patient states he is ok with an increase in the insulin. Dr. Ladi Cheney, please advise as to insulin orders.

## 2019-07-24 NOTE — TELEPHONE ENCOUNTER
Advised patient of Dr. Lynn Oppenheim  note. Patient verbalized understanding and had no further questions.

## 2019-07-29 ENCOUNTER — TELEPHONE (OUTPATIENT)
Dept: FAMILY MEDICINE CLINIC | Facility: CLINIC | Age: 49
End: 2019-07-29

## 2019-09-26 NOTE — TELEPHONE ENCOUNTER
Please review; protocol failed.     Requested Prescriptions   Pending Prescriptions Disp Refills   • HYDRALAZINE HCL 50 MG Oral Tab [Pharmacy Med Name: HYDRALAZINE  50MG TABLETS(ORANGE)] 270 tablet 0     Sig: TAKE 1 TABLET(50 MG) BY MOUTH THREE TIMES DAILY

## 2019-10-07 NOTE — TELEPHONE ENCOUNTER
Please Review;protocol failed.   Diabetes Medications  Protocol Criteria:  · Appointment scheduled in the past 6 months or the next 3 months  · A1C < 7.5 in the past 6 months  · Creatinine in the past 12 months  · Creatinine result < 1.5   Recent Outpatient

## 2019-10-14 NOTE — TELEPHONE ENCOUNTER
Please review; protocol failed.     Requested Prescriptions   Pending Prescriptions Disp Refills   • FUROSEMIDE 20 MG Oral Tab [Pharmacy Med Name: FUROSEMIDE 20MG TABLETS] 60 tablet 0     Sig: TAKE 1 TABLET(20 MG) BY MOUTH TWICE DAILY       Hypertensive Med

## 2019-10-24 NOTE — TELEPHONE ENCOUNTER
Per patient the insurance will not cover his insulin till 11/1. Please advise because he has ran out. Requesting a cheaper alternative so he can pay out of pocket.

## 2019-11-05 NOTE — TELEPHONE ENCOUNTER
Requested Prescriptions     Pending Prescriptions Disp Refills   • Liraglutide (VICTOZA) 18 MG/3ML Subcutaneous Solution Pen-injector 18 mL 0     Sig: SEE NOTES         Recent Visits  Date Type Provider Dept   07/12/19 Office Visit Elena Dye MD Ec

## 2019-11-15 DIAGNOSIS — E11.21 TYPE 2 DIABETES MELLITUS WITH DIABETIC NEPHROPATHY, WITHOUT LONG-TERM CURRENT USE OF INSULIN (HCC): ICD-10-CM

## 2019-11-15 NOTE — PROGRESS NOTES
11/15/2019  8:10 AM    Karl Grimaldo is a 47year old male. Chief complaint(s): Patient presents with:  Diabetes: f/u  Cramps: bilateral leg cramps miostly at night   HTN/ CRI    HPI:     Karl Grimaldo primary complaint is regarding as above.      Artu of water. Saw nephrologist about a month ago.       HISTORY:  Past Medical History:   Diagnosis Date   • Chicken pox    • High blood pressure    • High cholesterol    • Other and unspecified hyperlipidemia    • Renal disorder    • Type II or unspecified typ visit):  Current Outpatient Medications   Medication Sig Dispense Refill   • rOPINIRole HCl (REQUIP) 1 MG Oral Tab Take 1 tablet (1 mg total) by mouth nightly.  30 tablet 1   • AMLODIPINE BESYLATE 10 MG Oral Tab TAKE 1 TABLET(10 MG) BY MOUTH DAILY 90 tablet for rash. Neurological: Negative for headaches. Psychiatric/Behavioral: Negative for depressed mood. PHYSICAL EXAM:   Physical Exam    Constitutional: He appears well-developed and well-nourished.    /83 (BP Location: Right arm, Patient Posi ([facility] [address]  DIABETES A&P    LABORATORY & ORDERS: Blood test(s) ordered today ; Orders Placed This Encounter      **CMP  Comp Metabolic Panel (14) [E]      ** Lipid Panel [E]      Flulaval 0.5 ml 6 mon and older Quad single dose PF (00860)    Additional orders i Disp: , Rfl: 0. Requested Prescriptions     Signed Prescriptions Disp Refills   • rOPINIRole HCl (REQUIP) 1 MG Oral Tab 30 tablet 1     Sig: Take 1 tablet (1 mg total) by mouth nightly.       REFERRALS: Nephrologist  RECOMMENDATIONS: instructed in use of g Prescriptions     Signed Prescriptions Disp Refills   • rOPINIRole HCl (REQUIP) 1 MG Oral Tab 30 tablet 1     Sig: Take 1 tablet (1 mg total) by mouth nightly.            LABORATORY & ORDERS: Orders Placed This Encounter      **CMP  Comp Metabolic Panel (14

## 2019-11-16 RX ORDER — ROPINIROLE 1 MG/1
TABLET, FILM COATED ORAL
Qty: 90 TABLET | Refills: 1 | OUTPATIENT
Start: 2019-11-16

## 2019-11-19 NOTE — TELEPHONE ENCOUNTER
PA requested for medication Victoza 18 Mg / 3 ML pen-injectors    Key [id number]  Patient last name Lima Reyna   1970

## 2019-11-21 NOTE — TELEPHONE ENCOUNTER
Message left on pharmacy VM indicating Victoza has been approved. Granted date 11/10/2019-11/10/2020.

## 2019-11-21 NOTE — TELEPHONE ENCOUNTER
Prior authorization for Victoza injection completed w/ [employer] on cover my meds Key: [id number], turn around time 3-5 days.

## 2019-11-26 NOTE — TELEPHONE ENCOUNTER
Pharmacy called in to confirm increase in medication below. He states that the patient informed him it went from 18 to 20 units daily. Please advise.        Current Outpatient Medications:   •  TRESIBA FLEXTOUCH 200 UNIT/ML Subcutaneous Solution Pen-inj

## 2019-11-27 NOTE — TELEPHONE ENCOUNTER
Patient returned call, confirms has been on Tresiba 28 units nightly, sugars have been good on this dose. Dose reported at [address] Please advise on updated script pended below.     OV Notes 11/15/19:  Current meds include :  oral hypoglycemic include: Malik Fonder

## 2019-11-27 NOTE — TELEPHONE ENCOUNTER
Dr. Holly Ro, after reviewing the notes from the last [address] on 11/15/19 I did not see indication of changed dose to patients insulin (see note below)     Please advise

## 2020-01-18 NOTE — PROGRESS NOTES
1/18/2020  8:56 AM    Kathleen Young is a 45year old male. Chief complaint(s): Patient presents with:  Diabetes: 4 months f/u  CRI  HPI:     Kathleen Young primary complaint is regarding as above.      Logan Lozano is a 47 yrs old male who has type 2, i ago.       HISTORY:  Past Medical History:   Diagnosis Date   • Chicken pox    • High blood pressure    • High cholesterol    • Other and unspecified hyperlipidemia    • Renal disorder    • Type II or unspecified type diabetes mellitus without mention of c Medication Sig Dispense Refill   • Liraglutide -Weight Management (SAXENDA) 18 MG/3ML Subcutaneous Solution Pen-injector Inject 3 mg into the skin nightly.  5 pen 1   • Liraglutide -Weight Management (SAXENDA) 18 MG/3ML Subcutaneous Solution Pen-injector Systems   Constitutional: Positive for unexpected weight change (obese). Negative for chills, fatigue and fever. Eyes: Negative for visual disturbance. Respiratory: Negative for shortness of breath and wheezing.     Cardiovascular: Positive for leg swel Phosphatase 132 (H) 45 - 117 U/L    Bilirubin, Total 0.5 0.1 - 2.0 mg/dL    Total Protein 6.8 6.4 - 8.2 g/dL    Albumin 3.6 3.4 - 5.0 g/dL    Globulin  3.2 2.8 - 4.4 g/dL    A/G Ratio 1.1 1.0 - 2.0    FASTING Yes    LIPID PANEL   Result Value Ref Range Subcutaneous Solution Pen-injector, Inject 28 units into the skin once nightly, Disp: 6 pen, Rfl: 1  •  rOPINIRole HCl (REQUIP) 1 MG Oral Tab, Take 1 tablet (1 mg total) by mouth nightly., Disp: 30 tablet, Rfl: 1  •  Liraglutide (VICTOZA) 18 MG/3ML [place] week, then 1.8mg for the 3rd week, then 2.4mg for the 4th week , then increase to 3.0mg on the 5th week and remain at 3.0 mg SQ every night.       REFERRALS: Orders Placed This Encounter        Ophthomology Dr Winnie Lentz: instructed in use rOPINIRole HCl (REQUIP) 1 MG Oral Tab, Take 1 tablet (1 mg total) by mouth nightly., Disp: 30 tablet, Rfl: 1  •  Liraglutide (VICTOZA) 18 MG/3ML Subcutaneous Solution Pen-injector, SEE NOTES, Disp: 18 mL, Rfl: 0  •  AMLODIPINE BESYLATE 10 MG Oral Tab, TAKE Schedule a follow-up visit in  KPA/ PRN.          Orders This Visit:  Orders Placed This Encounter      BMP Basic Metabolic Panel (8) [E]      ** Lipid Panel [E]      ** Hemoglobin A1C  [E]      Microalb/Creat Ratio, Random Urine      Meds This Visit:  Requ

## 2020-02-12 NOTE — TELEPHONE ENCOUNTER
Refill passed per [facility], [facility] protocol.   Refill Protocol Appointment Criteria  · Appointment scheduled in the past 12 months or in the next 3 months  Recent Outpatient Visits            3 weeks ago Type 2 diabetes mellitus with diabetic nephropathy, wi

## 2020-03-16 NOTE — TELEPHONE ENCOUNTER
Please review; protocol failed.      Requested Prescriptions     Pending Prescriptions Disp Refills   • REPAGLINIDE 1 MG Oral Tab [Pharmacy Med Name: REPAGLINIDE 1MG TABLETS] 270 tablet 1     Sig: TAKE 1 TABLET(1 MG) BY MOUTH THREE TIMES DAILY BEFORE MEALS

## 2020-03-19 ENCOUNTER — TELEPHONE (OUTPATIENT)
Dept: FAMILY MEDICINE CLINIC | Facility: CLINIC | Age: 50
End: 2020-03-19

## 2020-03-19 NOTE — TELEPHONE ENCOUNTER
Pt's daughter is asking if its ok for him to come in for his routine blood work tomorrow 3/20/2020 or if he can wait on it.    Could call daughter at [phone]  Please advise

## 2020-03-21 NOTE — TELEPHONE ENCOUNTER
Patient called and states he received his test results from Dr. Juanpablo Fisher. (Renal Function)  Patient is to see nephrologist.  Patient states he tried to schedule appt, but office is closed. HE will call Monday. Results under Dr. Albert Garcia name.

## 2020-04-03 NOTE — PRE-SEDATION ASSESSMENT
[place] [facility]  IR Pre-Procedure Sedation Assessment    History of snoring or sleep or apnea?    No    History of previous problems with anesthesia or sedation  No    Physical Findings:  Neck: nl ROM  CV: RRR  PULM: normal respiratory rate/effor

## 2020-04-03 NOTE — PROCEDURES
[facility]  Procedure Note    [facility] Patient Status:  Outpatient in a Bed    1970 MRN [id number]   Location [facility] Attending Jennifer Shi MD   Hosp Day # 0 PCP Phyllis Aragon MD     Proc

## 2020-04-03 NOTE — H&P
[facility]   History & Physical    Branden Bowers Patient Status:  Outpatient in a Bed    1970 MRN [id number]   Location [facility] Attending aBrbi Plascencia MD   Hosp Day # 0 PCP Beryl Garrett MD MOUTH DAILY, Disp: 90 tablet, Rfl: 1  •  Insulin Degludec (TRESIBA FLEXTOUCH) 200 UNIT/ML Subcutaneous Solution Pen-injector, Inject 28 units into the skin once nightly, Disp: 6 pen, Rfl: 1  •  Liraglutide (VICTOZA) 18 MG/3ML Subcutaneous Solution Pen-inje 287.0     No results for input(s): PTP, INR, PTT in the last 168 hours. No results for input(s): GLU, BUN, CREATSERUM, GFRAA, GFRNAA, CA, NA, K, CL, CO2 in the last 168 hours.     Assessment/Plan:   Impression: End stage renal disease    Recommendations: T

## 2020-04-06 NOTE — TELEPHONE ENCOUNTER
Dr. Howard Ashford, see message below. Referral has been pended.      Please reply to pool:  MANAGED CARE - MAIN

## 2020-04-06 NOTE — TELEPHONE ENCOUNTER
Patient speaks Luxembourgish. Patient is requesting a referral to get a renal evaluation for a kidney transplant. Patient's appt is 07/07/2020. Phone 21 . Fax # [phone],  [facility]

## 2020-04-07 NOTE — TELEPHONE ENCOUNTER
Dr Courtney Martinez please see message below if possible can you call patient and verify that he is not trying to go to [facility], [place], and [place] for Kidney transplant consult. Insurance will not authorize referrals to multiple locations.  If possible please verify

## 2020-04-07 NOTE — TELEPHONE ENCOUNTER
Spoke to [facility] who states patient is requesting a referral to see Transplant specialist. Informed [facility] of patient requesting a referral for Anatoliy Avendano ( See encounter 04/06/2020)    [facility] [facility] states patient will need to first start dialysis and find out if Nephr

## 2020-04-07 NOTE — TELEPHONE ENCOUNTER
Mikhail Lee from [place] states patient will need referral to see them for kidney evaluation. States referral should have [place] cpt code of 35776 and it should include labs and clinicals.  Please advice

## 2020-04-07 NOTE — TELEPHONE ENCOUNTER
Confirmed he has been going to [facility] for possible renal transplant. Already started hemodialysis.

## 2020-04-08 NOTE — TELEPHONE ENCOUNTER
Treating facilty: [address],[place] [id number]  [facility]# [id number] TaxID#: [id number]

## 2020-04-08 NOTE — TELEPHONE ENCOUNTER
Received fax that pt needs a referral for oupt dialysis. Please advise.      CPT CODES: 52544/64680/18084   Dx: N17.9 and N18.6

## 2020-04-09 NOTE — TELEPHONE ENCOUNTER
Per [facility], the patient must be recommended by the Specialist for the consult at [facility].  We will need the approval letter from Dr. Roseanne Price recommending consultation for possible kidney transpant     Thank you,  Jayce Yuan

## 2020-04-10 ENCOUNTER — TELEPHONE (OUTPATIENT)
Dept: FAMILY MEDICINE CLINIC | Facility: CLINIC | Age: 50
End: 2020-04-10

## 2020-04-10 NOTE — TELEPHONE ENCOUNTER
Mulugeta Morales, a  with [address] [place] [address]  called and advised that if we have any questions about the patient's treatment, or to speak with a nurse, dietitian,  about the patient to please contact them at [phone]. She did advise the patient just started treatments there.

## 2020-04-13 NOTE — TELEPHONE ENCOUNTER
Phone call made s/t patient advised that per [facility] JUAN, the patient must be recommended by the Specialist for the consult at [facility]. We will need the approval letter from Dr. Erickson Motley recommending consultation for possible kidney transplant.  Patient verbalized unders

## 2020-04-16 NOTE — TELEPHONE ENCOUNTER
Goran Palma from [address] [place] [address] Dr advised that the patient does not know if he needs a referral and if the referral is to be sent from Dr. Erin Skaggs or the Nephrologist in order to see the transplant center.     She stated the patient is seeing Nephrologist

## 2020-04-16 NOTE — TELEPHONE ENCOUNTER
Nhi Skelton from [facility] stated she spoke with [address] regarding a referral that was sent and has been left in a open status.     Referral has to be changed to pending, also a the date range must be change 04/06/20-04/06/21

## 2020-04-16 NOTE — TELEPHONE ENCOUNTER
See referral [id number].  Magui Ling was calling to informed clinical staff to contact Dr. Kathya Dee office for recommendation letter to transplant team. Informed Magui Ling of telephone encounter 04/06/2020.     04/09/2020  4:01 PM Fernando Ellsworth - -   Note    Sent

## 2020-04-16 NOTE — TELEPHONE ENCOUNTER
[facility], correct location entered on referral. Spoke to clinic who confirmed location.

## 2020-04-24 NOTE — TELEPHONE ENCOUNTER
Referral has been approved.  Approval faxed to [phone] , Lorena Desai and Essie Cline ID #:  [id number]  REFERRAL STATUS:  AUTHORIZED  DATE AUTHORIZED:  04/06/2020 // Peggy Elissa: 04/06/2021   REFERRED BY:  Merna Jackson MD (TE

## 2020-04-29 NOTE — TELEPHONE ENCOUNTER
Pt states he has surgery scheduled tomorrow to have fistula put in his arm and the surgeon has told him to check if he should take his insulin on that day. Per pt he takes the insulin at night and so med would be scheduled after the surgery.   Pt asking

## 2020-04-30 NOTE — TELEPHONE ENCOUNTER
Lyndsey with [employer] Verification Department called    Patient needs a referral for the procedure for the Left AV Fistula with Dr. Rubia Lewis with Vascular Surgery. Referral needs to include CPT Code.      Patient is scheduled for the procedure t

## 2020-04-30 NOTE — TELEPHONE ENCOUNTER
Dr. Ishaan Vazquez, see message below. Route message to managed care.      Please reply to pool: EM MANAGED CARE - MAIN

## 2020-04-30 NOTE — TELEPHONE ENCOUNTER
Advised patient of Dr. Maya gordillo. Patient verbalized understanding and had no further questions.

## 2020-05-01 PROBLEM — N18.5: Status: ACTIVE | Noted: 2020-05-01

## 2020-05-01 NOTE — BRIEF OP NOTE
[facility] POST ANESTHESIA CARE UNIT  Brief Op Note       Patients Name: Caiopiedad Russels  Attending Physician: Shahnaz Carpio MD  Operating Physician: Prashanth Sandoval MD  CSN: [id number]     Location:  OR  MRN: [id number]    YOB: 1970  Adm

## 2020-05-01 NOTE — ANESTHESIA POSTPROCEDURE EVALUATION
Patient: Kel Sahni    Procedure Summary     Date:  05/01/20 Room / Location:  [facility] OR 09 / [facility] OR    Anesthesia Start:  6617 Anesthesia Stop:  1588    Procedure:   A.V. FISTULA (Left Lower Arm) Diagnosis:  (renal failure)    Surgeon:  Madonna Sheehan

## 2020-05-01 NOTE — H&P
History & Physical Examination    Patient Name: Stephanie Hudson  MRN: [id number]  CSN: [id number]  YOB: 1970    Diagnosis: renal failure    History Present Illness: Patient is a 45year old male  has a past medical history of Chicken pox, Dialy TAKE 1 TABLET(20 MG) BY MOUTH EVERY NIGHT ), Disp: 90 tablet, Rfl: 1, 4/30/2020 at 0800  hydrALAzine HCl 50 MG Oral Tab, TAKE 1 TABLET(50 MG) BY MOUTH THREE TIMES DAILY, Disp: 270 tablet, Rfl: 1, 5/1/2020 at 0400      0.9% NaCl infusion, , Intravenous, Con ABDOMEN [x ] [x ]    UROGENITAL [x ] [x ]    EXTREMITIES [x ] [x ] Good pulses left arm   OTHER        [ x ] I have discussed the risks of the planned operation including bleeding, infection, limb loss, temporary or permanent nerve injury, scarring, disf

## 2020-05-01 NOTE — ANESTHESIA PREPROCEDURE EVALUATION
Anesthesia PreOp Note    HPI:     Arlene Boas is a 45year old male who presents for preoperative consultation requested by: Alina Ramirez MD    Date of Surgery: 5/1/2020    Procedure(s):  A.V. FISTULA  Indication: renal failure    Relevant Problems 4/30/2020 at 1900  ROPINIROLE HCL 1 MG Oral Tab, TAKE 1 TABLET(1 MG) BY MOUTH EVERY NIGHT, Disp: 90 tablet, Rfl: 1, 4/30/2020 at 0800  LISINOPRIL 20 MG Oral Tab, TAKE 1 TABLET(20 MG) BY MOUTH DAILY, Disp: 90 tablet, Rfl: 1, Past Month  Insulin Degludec (TR file        Non-medical: Not on file    Tobacco Use      Smoking status: Former Smoker        Packs/day: 0.50        Types: Cigarettes        Quit date: 2014        Years since quittin.5      Smokeless tobacco: Never Used    Substance and Sexual A 04/03/2020     Lab Results   Component Value Date     03/27/2020    K 3.3 (L) 05/01/2020     (H) 03/27/2020    CO2 18.0 (L) 03/27/2020    BUN 53 (H) 03/27/2020    CREATSERUM 3.89 (H) 03/27/2020    GLU 78 03/27/2020    PGLU 110 (H) 05/01/2020

## 2020-05-04 NOTE — OPERATIVE REPORT
[facility]    PATIENT'S NAME: Lina Dominiqueccasin   ATTENDING PHYSICIAN: Neil Balbuena MD   OPERATING PHYSICIAN: Neil Balbuena MD   PATIENT ACCOUNT#:   [de-identified]    LOCATION:  [place] [address]  MEDICAL RECORD #:   [id number]       DATE OF most proximal aspect of the radial artery a length of about 10 mm. I spatulated the cephalic vein.   Did an  end-to-side anastomosis between the proximal forearm cephalic vein and the radial artery with a standard microvascular technique and 7-0 Prolene aggarwal

## 2020-06-09 NOTE — PROGRESS NOTES
6/9/2020  1:24 PM    Khai Garcia is a 45year old male. Chief complaint(s): Patient presents with: Follow - Up: Present for diabetic follow up.    Referral: Needs referral for [facility].   CRI and Diabetes  HPI:     Khai Garcia primary Cr at 3.16 with GFT 21 .       HISTORY:  Past Medical History:   Diagnosis Date   • Chicken pox    • Dialysis patient [facility])     mwf rt chest catheter   • High blood pressure    • High cholesterol    • Other and unspecified hyperlipidemia    • Renal disorder Quad Prsv Free 0.5 ml (70999)                          12/19/2016      Medications (Active prior to today's visit):  Current Outpatient Medications   Medication Sig Dispense Refill   • VELTASSA 16.8 g Oral Powd Pack      • Liraglutide -Weight Management (S Temp 98.2 °F (36.8 °C) (Oral)   Resp 18   Ht 5' 10\" (1.778 m)   Wt 242 lb 3 oz (109.9 kg)   BMI 34.75 kg/m²    HENT:   Head: Normocephalic. Eyes: Conjunctivae are normal. No scleral icterus. Neck: Neck supple.    Cardiovascular: Normal rate, regular rh Rfl: 1  •  Insulin Degludec (TRESIBA FLEXTOUCH) 200 UNIT/ML Subcutaneous Solution Pen-injector, Inject 28 units into the skin once nightly, Disp: 6 pen, Rfl: 1  •  Rosuvastatin Calcium 20 MG Oral Tab, TAKE 1 TABLET(20 MG) BY MOUTH EVERY NIGHT (Patient swetakarolyn Gianna Serrano MD

## 2020-06-27 ENCOUNTER — TELEPHONE (OUTPATIENT)
Dept: FAMILY MEDICINE CLINIC | Facility: CLINIC | Age: 50
End: 2020-06-27

## 2020-06-27 NOTE — TELEPHONE ENCOUNTER
Liraglutide -Weight Management (SAXENDA SC), Inject 6 mg into the skin nightly.  Taking for glucose control , Disp: , Rfl:

## 2020-07-07 NOTE — TELEPHONE ENCOUNTER
Pharm is questioning Saxenda prescription. Previous rx was for 3mg. Now the prescription is for 6mg. The current Saxenda pen gives a max of 3mg. If 6mg is in fact, now the correct dose, he will need double the pens. Please advise.   I could not tell fr

## 2020-08-13 NOTE — H&P
[facility]   History & Physical    Coit Roam Patient Status:  Outpatient in a Bed    1970 MRN [id number]   Location [facility] Attending Cynthia Leung MD   Hosp Day # 0 PCP Nicolasa Woodall MD Grandmother         type 2       Allergies/Medications: Allergies:  No Known Allergies    Medications:  No current outpatient medications on file.     Physical Exam & Review of Systems:   Physical Exam:    /76 (BP Location: Right arm)   Pulse 72   R

## 2020-08-13 NOTE — PROGRESS NOTES
Tunnel Dialysis catheter removed from right chest using local anesthetic 10 ml 2% lidocaine. Manual pressure held to [facility] and catheter site to hemostasis. Patient tolerated well.

## 2020-08-13 NOTE — TELEPHONE ENCOUNTER
With the help of [id number] [employer] #: [id number]. Patient advised he needs a refill on his Krista UltraFine Needles for his insulin    He is out of them. Please Advise.           [facility] [address] [facility]

## 2020-08-13 NOTE — IVS NOTE
Patient alert and oriented after procedure. Denies any complaints of pain. Patient ambulatory with steady gait. No redness no bleeding noted from procedural site. Patient discharged to [facility] with family.

## 2020-09-29 NOTE — TELEPHONE ENCOUNTER
Per pharmacy PA is needed for the following medication. •  Liraglutide -Weight Management (SAXENDA) 18 MG/3ML Subcutaneous Solution Pen-injector, Inject 3 mg into the skin nightly.  Taking for glucose control, Disp: 6 pen, Rfl: 1    Key: [id number]  David Shelley

## 2020-09-30 NOTE — TELEPHONE ENCOUNTER
Prior authorization for irene has been initiated through [employer] using keycode: [id number] It takes about 1-5 business days for a decision to come back.

## 2020-10-14 ENCOUNTER — TELEPHONE (OUTPATIENT)
Dept: FAMILY MEDICINE CLINIC | Facility: CLINIC | Age: 50
End: 2020-10-14

## 2020-10-14 NOTE — TELEPHONE ENCOUNTER
[place] [facility] Surgery has not been scheduled. Patient is requesting a referral for Cecile Ortega. Please advise.       Fax [phone]  Phone [phone]

## 2020-10-15 NOTE — TELEPHONE ENCOUNTER
Spoke to patient asking him what kind of specialist is  Meme Joy he states he's not sure. That he was told by Deb that he needs a referral. He doesn't know who he's seeing. He states Deb is helping him with papers for his transplant. I called [phone] to speak to Deb and they stated I had the wrong number. I call Kaitlyn Stoner back asking him for Deb's information so I can speak to her so that the correct referrals are placed.  He states he's not home and will call back with Deb's info so I can speak to her

## 2020-10-15 NOTE — TELEPHONE ENCOUNTER
Patient called back and gave me Jp Sanchez phone number to call [phone] or [phone]. I called [phone] and it wasn't Rosy's voice mail. I called [phone] and it immediately asked for a call back number. Statement Selected

## 2020-10-15 NOTE — TELEPHONE ENCOUNTER
I called [facility] transplant [phone] to see if they can assist me with what patient is needing.  I was on hold for 22 minutes

## 2020-10-20 NOTE — TELEPHONE ENCOUNTER
Patient was advised by his transplant specialist to have the following test done:     Colonoscopy  CT abdominal and pelvis with contrast   Cardiogram    Patient will have his  fax a document with additional information on 10/21.

## 2020-10-24 NOTE — TELEPHONE ENCOUNTER
Tests ordered, except cardiac profusion test which will be done after oredered test completed. Needs an appt with after these tests are done. r

## 2020-10-24 NOTE — TELEPHONE ENCOUNTER
Phone call made s/t pt stated that he or his daughter will stop by office to p/u orders so that he can call to schedule appts for test ordered.

## 2020-11-10 NOTE — TELEPHONE ENCOUNTER
Patient completed his test for pre-op for possible kidney transplant. Phone call made to patient advising if he would like to p/u copies, have them mailed or fax.  Per patient he will call us back with the [facility] since he is not home and does

## 2020-11-11 NOTE — TELEPHONE ENCOUNTER
Dr. Ladi Cheney, patient states Dr. Caden Thompson does not accept his insurance.  Please advise on different specialist.     John Guerra MD [facility] [address] [place] [address] [phone] [place] [address] MD VASCULAR SURGERY

## 2020-12-09 RX ORDER — LIRAGLUTIDE 6 MG/ML
INJECTION, SOLUTION SUBCUTANEOUS
Qty: 18 ML | Refills: 0 | OUTPATIENT
Start: 2020-12-09

## 2020-12-10 NOTE — H&P
[address] [facility] [address] Gastroenterology                                                                                                  Clinic History and Physical     Pa • Lipids Mother         hyperlipidemia   • Prostate Cancer Maternal Grandfather    • Diabetes Maternal Grandmother         type 2      Social History: Social History    Tobacco Use      Smoking status: Former Smoker        Packs/day: 0.50        Types: C negative for severe shortness of breath  CARDIOVASCULAR:  negative for crushing sub-sternal chest pain  GASTROINTESTINAL:  see HPI  GENITOURINARY:  negative for dysuria or gross hematuria  INTEGUMENT/BREAST:  SKIN:  negative for jaundice   ALLERGIC/IMMUNOL transplant    Recommend:  -of note at end of visit, patient mentions he already had bowel prep at home. On further questioning he says he already has [place] [facility]  I called Dr. Cortney Rojas at [facility] and confirmed that pt does

## 2020-12-14 NOTE — TELEPHONE ENCOUNTER
Current Outpatient Medications:   •  SAXENDA 18 MG/3ML Subcutaneous Solution Pen-injector, INJECT 3 MG UNDER THE SKIN NIGHTLY.  TAKING FOR GLUCOSE CONTROL, Disp: 18 mL, Rfl: 0

## 2020-12-24 NOTE — TELEPHONE ENCOUNTER
Maine Betts from [employer] at [facility] requesting a referral for colonscopy     Please advise she will fax clinicals for this request to [facility]     Patient will be having done at [address]

## 2020-12-30 NOTE — TELEPHONE ENCOUNTER
Dr. Ladi Cheney, patient was seen by [facility] tranplant clinic on 10/13/2020. Stress test is being requested.  Please advise    Nuc Med Stress Vasodilator Imaging Routine Pre-operative cardiovascular examination   1 Occurrences starting 10/14/2020 until 01

## 2020-12-30 NOTE — TELEPHONE ENCOUNTER
Patient is requesting an order for a nuclear vasodilator stress test. Patient was instructed to complete test by [facility] as part of his approval process for his kidney transplant.  Patient's contact in [facility] is Chica Branch and she can reached at [phone]

## 2021-01-07 NOTE — TELEPHONE ENCOUNTER
Screening colonoscopy must be done at [address] [place] [address]. Contacted patient to convey this message along with an .

## 2021-01-16 NOTE — PROGRESS NOTES
1/16/2021  8:44 AM    Lawrence Newman is a 45year old male. Chief complaint(s): Patient presents with:  Diabetes: follow up  HTN/ CRI  HPI:     Lawrence Newman primary complaint is regarding as above.      Baudilio Carter is a 54 yrs old male who has type 2, chest catheter   • High blood pressure    • High cholesterol    • Other and unspecified hyperlipidemia    • Renal disorder    • Type II or unspecified type diabetes mellitus without mention of complication, not stated as uncontrolled dx'd in March 2015   • FLUZONE 6 months and older PFS 0.5 ml (51253)                          12/19/2016      Medications (Active prior to today's visit):  Current Outpatient Medications   Medication Sig Dispense Refill   • TRESIBA FLEXTOUCH 200 UNIT/ML Subcutaneous Solution Pe 78   Ht 5' 10\" (1.778 m)   Wt 247 lb 9.6 oz (112.3 kg)   BMI 35.53 kg/m²     Physical Exam    Constitutional: He appears well-developed and well-nourished. HENT:   Head: Normocephalic. Eyes: Conjunctivae are normal. No scleral icterus.    Neck: Neck aggarwal myocardial perfusion images post pharmacologic stress demonstrate decreased tracer the apex that could represent motion artifact. The resting images demonstrate no definite perfusion defects.           CONCLUSION: Normal regadenoson (Lexiscan) myocardial p Rfl: 6  •  AMLODIPINE BESYLATE 10 MG Oral Tab, TAKE 1 TABLET(10 MG) BY MOUTH DAILY, Disp: 90 tablet, Rfl: 2  •  VELTASSA 16.8 g Oral Powd Pack, , Disp: , Rfl:   •  FUROSEMIDE 20 MG Oral Tab, TAKE 1 TABLET(20 MG) BY MOUTH TWICE DAILY (Patient taking differe Panel (14) [E]    RECOMMENDATIONS given include: avoid pseudoephedrine or other stimulants/decongestants in common cold remedies, decrease consumption of alcohol, perform routine monitoring of blood pressure with home blood pressure cuff, exercise, reducti

## 2021-01-27 NOTE — TELEPHONE ENCOUNTER
•  FUROSEMIDE 20 MG Oral Tab, TAKE 1 TABLET(20 MG) BY MOUTH TWICE DAILY (Patient taking differently: every morning.  ), Disp: 180 tablet, Rfl: 0

## 2021-02-03 ENCOUNTER — TELEPHONE (OUTPATIENT)
Dept: FAMILY MEDICINE CLINIC | Facility: CLINIC | Age: 51
End: 2021-02-03

## 2021-02-03 NOTE — TELEPHONE ENCOUNTER
Attempted several times to get a hold of patient, both times call was disconnected. Please find out the exact reason he is requesting a chest xray. Traveling is not a correct diagnosis.

## 2021-02-03 NOTE — TELEPHONE ENCOUNTER
Patient states \"[facility] dialysis center is requesting that he get a chest xray in order to travel to [place]\"

## 2021-02-03 NOTE — TELEPHONE ENCOUNTER
Patient is needing a chest xray to prove that he doesn't have tuberculosis.  As a child he received the BCG vaccine and when he gets a tb skin test it turns positive so he is needing a chest xray

## 2021-02-04 NOTE — TELEPHONE ENCOUNTER
Patient informed of message below. He is requesting copy of xray result from 11/2020.  Copy was placed at [facility]  per patient's request.

## 2021-03-15 NOTE — TELEPHONE ENCOUNTER
Patient requesting referral for [facility] foot clinic. States he has an appointment today 3/15 at 2:30 to get his toe nails clipped.     [phone]   fax [phone]

## 2021-03-15 NOTE — TELEPHONE ENCOUNTER
Dr. Efren Hernandez, patient is requesting a referral to Podiatry. Referral has been pended, please advise.        Please reply to pool: EM [facility] CARE - MAIN

## 2021-04-06 NOTE — TELEPHONE ENCOUNTER
Received a fax from [facility] requesting referral/authorization for dialysis treatment. 04/06/21 to 04/6/22 160 units.

## 2021-04-07 NOTE — TELEPHONE ENCOUNTER
Please generate the referral with all the completed information from the fax, such as cpt codes and dx codes and number of visits.      Thank you, Hector

## 2021-05-03 RX ORDER — LIRAGLUTIDE 6 MG/ML
3 INJECTION, SOLUTION SUBCUTANEOUS NIGHTLY
Qty: 18 ML | Refills: 0 | Status: SHIPPED | OUTPATIENT
Start: 2021-05-03 | End: 2021-06-15

## 2021-05-03 NOTE — TELEPHONE ENCOUNTER
Per pharmacy pt is requesting refill for the following medication.       •  HYDRALAZINE HCL 50 MG Oral Tab, TAKE 1 TABLET(50 MG) BY MOUTH THREE TIMES DAILY, Disp: 270 tablet, Rfl: 1

## 2021-06-15 RX ORDER — LIRAGLUTIDE 6 MG/ML
3 INJECTION, SOLUTION SUBCUTANEOUS NIGHTLY
Qty: 18 ML | Refills: 0 | Status: SHIPPED | OUTPATIENT
Start: 2021-06-15 | End: 2021-08-05

## 2021-06-29 NOTE — TELEPHONE ENCOUNTER
Patient called again, said a referral for kidney transplant has to go to [place]. Wants to speak to [facility] regarding it. Please call back.

## 2021-06-29 NOTE — TELEPHONE ENCOUNTER
Spoke to pt and Deb coordinator. Patient has been asking for referral since Oct 2020. Needs referral asap or he will be removed from Kidney transplant list. Please see pended referral. ICD-10 and CPT codes provided by Deb.    Fax # [phone] / # 90

## 2021-06-29 NOTE — TELEPHONE ENCOUNTER
Patient is requesting a call back from Anthony. He said he stopped by office and was speaking to her today. Please call back.

## 2021-06-30 NOTE — TELEPHONE ENCOUNTER
Vane Hudson is in network for this particular organ transplant.  If Dr Gela Hagan is in agreement, I can change the referral to [facility], no need for a new referral. The referral will be for the consult and then we will work our way up to t

## 2021-06-30 NOTE — TELEPHONE ENCOUNTER
Referral was generated incorrectly, correct referral has been generated. Patient has HMO, insurance authorization is needed.

## 2021-06-30 NOTE — TELEPHONE ENCOUNTER
What is this for a transplant or a global transplant work up? Please advise. Thank you,     Hector BARDALES

## 2021-06-30 NOTE — TELEPHONE ENCOUNTER
For the actual transplant auth: We need the approval letter from the transplant department (this will include a packet of the work up) and the PCP letter stating they are in agreement with the type of transplant and at the location it is being done.  The PC

## 2021-07-04 DIAGNOSIS — E11.21 TYPE 2 DIABETES MELLITUS WITH DIABETIC NEPHROPATHY, WITHOUT LONG-TERM CURRENT USE OF INSULIN (HCC): ICD-10-CM

## 2021-07-05 RX ORDER — ROSUVASTATIN CALCIUM 20 MG/1
TABLET, COATED ORAL
Qty: 90 TABLET | Refills: 1 | OUTPATIENT
Start: 2021-07-05

## 2021-07-05 RX ORDER — FUROSEMIDE 20 MG/1
TABLET ORAL
Qty: 180 TABLET | Refills: 0 | OUTPATIENT
Start: 2021-07-05

## 2021-07-08 ENCOUNTER — TELEPHONE (OUTPATIENT)
Dept: FAMILY MEDICINE CLINIC | Facility: CLINIC | Age: 51
End: 2021-07-08

## 2021-07-08 NOTE — TELEPHONE ENCOUNTER
Patient is requesting a referral for a kidney transplant faxed to [facility] in [place].      Darian Esposito   Fax [phone]

## 2021-08-03 NOTE — TELEPHONE ENCOUNTER
Protocol failed or has No Protocol, please review  Requested Prescriptions   Pending Prescriptions Disp Refills    SAXENDA 18 MG/3ML Subcutaneous Solution Pen-injector [Pharmacy Med Name: Ivanna Naylor 6MG/ML PEN INJ 3ML] 18 mL 0     Sig: ADMINISTER 3 MG UNDER THE SKIN EVERY NIGHT        There is no refill protocol information for this order         Future Appointments         Provider Department Appt Notes    In 1 week Keira Prabhakar MD [place] [facility] [employer] [facility], [facility], [facility] Tom Vince 5 month          Recent Outpatient Visits              6 months ago Type 2 diabetes mellitus with diabetic nephropathy, without long-term current use of insulin [facility])    [facility], [employer], Chinmay Prabhakar MD    Office Visit    7 months ago Colon cancer screening    [facility], [facility], [place]    Office Visit    8 months ago Arteriovenous fistula stenosis, initial encounter [facility])    Vascular - [address], [place] Najjar, Monica Shabazz MD    Office Visit    1 year ago CRI (chronic renal insufficiency), stage 5 [facility])    [facility], [address], Chinmay Prabhakar MD    Office Visit    1 year ago Type 2 diabetes mellitus with diabetic nephropathy, without long-term current use of insulin [facility])    [facility], [address], Chinmay Prabhakar MD    Office Visit

## 2021-08-04 RX ORDER — LIRAGLUTIDE 6 MG/ML
INJECTION, SOLUTION SUBCUTANEOUS
Qty: 18 ML | Refills: 0 | OUTPATIENT
Start: 2021-08-04

## 2021-08-05 NOTE — PROGRESS NOTES
8/5/2021  3:21 PM    Shima Orozco is a 45year old male. Chief complaint(s): Patient presents with:  Diabetes: follow up    HPI:     Shima Row primary complaint is regarding multiple complaints.      Omero Worrell is a 54 yrs old male who has type Dialysis patient [facility])     mwf rt chest catheter   • High blood pressure    • High cholesterol    • Other and unspecified hyperlipidemia    • Renal disorder    • Type II or unspecified type diabetes mellitus without mention of complication, not stated as u flower (10653)                          12/19/2016      Medications (Active prior to today's visit):  Current Outpatient Medications   Medication Sig Dispense Refill   • amLODIPine besylate 10 MG Oral Tab Take 1 tablet (10 mg total) by mouth daily.  90 tablet 2 reviewed. Constitutional:       Appearance: Normal appearance. He is well-developed. HENT:      Head: Normocephalic. Eyes:      General: No scleral icterus.      Conjunctiva/sclera: Conjunctivae normal.   Cardiovascular:      Rate and Rhythm: Normal r amLODIPine besylate 10 MG Oral Tab, Take 1 tablet (10 mg total) by mouth daily. , Disp: 90 tablet, Rfl: 2  •  furosemide 20 MG Oral Tab, Take 1 tablet (20 mg total) by mouth 2 (two) times daily. , Disp: 180 tablet, Rfl: 0  •  hydrALAzine HCl 50 MG Oral Tab, 90 tablet 1     Sig: Take 1 tablet (20 mg total) by mouth nightly. • Insulin Degludec (TRESIBA FLEXTOUCH) 200 UNIT/ML Subcutaneous Solution Pen-injector 18 mL 3     Sig: Inject 28 Units into the skin nightly.       REFERRALS: Nephrologist  RECOMMENDATIONS task.  Attempt to keep a schedule that includes an adequate sleep-work-physical exercise balance. Advised to increase water intake especially just before meals.  Patient was educated that this will not be a temporary change, but a life time, life style garcia mL 3     Sig: Inject 28 Units into the skin nightly.        Imaging & Referrals:  None         Madiha Mena MD

## 2021-08-16 NOTE — TELEPHONE ENCOUNTER
Pt. wants to know if he is able to get the 3rd coronoa vaccine at our clinic, or should he go to [facility]?

## 2021-09-01 NOTE — TELEPHONE ENCOUNTER
Received a fax from [facility] requesting a referral for an evalution and transplant workup.  If in agreement, please sign referral.     Thank you, Hector

## 2021-09-27 NOTE — TELEPHONE ENCOUNTER
Please review. Protocol Failed / No Protocol.     Requested Prescriptions   Pending Prescriptions Disp Refills    SAXENDA 18 MG/3ML Subcutaneous Solution Pen-injector [Pharmacy Med Name: SAXENDA 6MG/ML PEN INJ 3ML] 18 mL 0     Sig: ADMINISTER 3 MG UNDER TH

## 2021-11-10 NOTE — TELEPHONE ENCOUNTER
Please review refill protocol failed/ no protocol  Requested Prescriptions   Pending Prescriptions Disp Refills    SAXENDA 18 MG/3ML Subcutaneous Solution Pen-injector [Pharmacy Med Name: SAXENDA 6MG/ML PEN INJ 3ML] 18 mL 0     Sig: ADMINISTER 3 MG UNDER T

## 2022-01-11 NOTE — TELEPHONE ENCOUNTER
I was on hold for 20 minutes to speak to [place] and they state that patient already picked up the pen needles

## 2022-01-20 NOTE — TELEPHONE ENCOUNTER
Patient was in the clinic today for an appt at 945 but was unable to be seen due to insurance issues. Letter generated.

## 2022-01-27 RX ORDER — HYDRALAZINE HYDROCHLORIDE 50 MG/1
50 TABLET, FILM COATED ORAL 3 TIMES DAILY
Qty: 10 TABLET | Refills: 0 | Status: SHIPPED | OUTPATIENT
Start: 2022-01-27 | End: 2022-02-08

## 2022-01-27 RX ORDER — HYDRALAZINE HYDROCHLORIDE 50 MG/1
50 TABLET, FILM COATED ORAL 3 TIMES DAILY
Qty: 90 TABLET | Refills: 0 | Status: SHIPPED | OUTPATIENT
Start: 2022-01-27 | End: 2022-01-27

## 2022-01-27 NOTE — TELEPHONE ENCOUNTER
Please review. Protocol Failed / No Protocol.     Requested Prescriptions   Pending Prescriptions Disp Refills    HYDRALAZINE 50 MG Oral Tab [Pharmacy Med Name: HYDRALAZINE  50MG TABLETS(ORANGE)] 270 tablet 0     Sig: TAKE 1 TABLET(50 MG) BY MOUTH THREE TIMES DAILY        Hypertensive Medications Protocol Failed - 1/27/2022  1:09 PM        Failed - CMP or BMP in past 12 months        Failed - GFR Non- > 50     Lab Results   Component Value Date    GFRNAA 11 (L) 01/16/2021                 Passed - Appointment in past 6 or next 3 months              Recent Outpatient Visits              5 months ago Type 2 diabetes mellitus with diabetic nephropathy, without long-term current use of insulin [facility])    [email or web address], [place], Chinmay Gomes MD    Office Visit    1 year ago Type 2 diabetes mellitus with diabetic nephropathy, without long-term current use of insulin [facility])    [place] [employer], [place], Chinmay Gomes MD    Office Visit    1 year ago Colon cancer screening    [facility] [place], Dayana Owen MD    Office Visit    1 year ago Arteriovenous fistula stenosis, initial encounter [facility])    Vascular - Zeferino Bennett MD    Office Visit    1 year ago CRI (chronic renal insufficiency), stage 5 [facility])    [email or web address], [place], Geovani Hermosillo MD    Office Visit

## 2022-01-27 NOTE — TELEPHONE ENCOUNTER
Please review refill failed/no protocol     Requested Prescriptions     Pending Prescriptions Disp Refills    hydrALAZINE 50 MG Oral Tab 270 tablet 1     Sig: Take 1 tablet (50 mg total) by mouth 3 (three) times daily. Recent Visits  Date Type Provider Dept   08/05/21 Office Visit MD [facility]un [facility]   01/16/21 Office Visit Fermin Martinez MD [facility]   Showing recent visits within past 540 days with a meds authorizing provider and meeting all other requirements  Future Appointments  No visits were found meeting these conditions. Showing future appointments within next 150 days with a meds authorizing provider and meeting all other requirements    Requested Prescriptions   Pending Prescriptions Disp Refills    hydrALAZINE 50 MG Oral Tab 270 tablet 1     Sig: Take 1 tablet (50 mg total) by mouth 3 (three) times daily.         Hypertensive Medications Protocol Failed - 1/27/2022 12:36 PM        Failed - CMP or BMP in past 12 months        Failed - GFR Non- > 50     Lab Results   Component Value Date    GFRNAA 11 (L) 01/16/2021                 Passed - Appointment in past 6 or next 3 months              Recent Outpatient Visits              5 months ago Type 2 diabetes mellitus with diabetic nephropathy, without long-term current use of insulin [facility] God[facility] Alber[facility]    Office Visit    1 year ago Type 2 diabetes mellitus with diabetic nephropathy, without long-term current use of insulin [facility])    [facility]    Office Visit    1 year ago Colon cancer screening    [facility] [address] Holden Horn MD    Office Visit    1 year ago Arteriovenous fistula stenosis, initial encounter [facility])    [facility] - Ildefonso Lopez MD    Office Visit    1 year ago CRI (chronic renal insufficiency), stage 5 [facility])    [facility]    Office Visit

## 2022-02-01 RX ORDER — REPAGLINIDE 1 MG/1
1 TABLET ORAL
Qty: 10 TABLET | Refills: 0 | Status: SHIPPED | OUTPATIENT
Start: 2022-02-01 | End: 2022-03-17

## 2022-02-09 RX ORDER — HYDRALAZINE HYDROCHLORIDE 50 MG/1
50 TABLET, FILM COATED ORAL 3 TIMES DAILY
Qty: 30 TABLET | Refills: 0 | Status: SHIPPED | OUTPATIENT
Start: 2022-02-09 | End: 2022-02-10

## 2022-02-09 NOTE — TELEPHONE ENCOUNTER
Please review. Protocol failed or does not have a protocol.      Requested Prescriptions   Pending Prescriptions Disp Refills    HYDRALAZINE 50 MG Oral Tab [Pharmacy Med Name: HYDRALAZINE  50MG TABLETS(ORANGE)] 10 tablet 0     Sig: TAKE 1 TABLET(50 MG) BY MOUTH THREE TIMES DAILY        Hypertensive Medications Protocol Failed - 2/7/2022  3:28 PM        Failed - CMP or BMP in past 12 months        Failed - Appointment in past 6 or next 3 months        Failed - GFR Non- > 50     Lab Results   Component Value Date    GFRNAA 11 (L) 01/16/2021                       Recent Outpatient Visits              6 months ago Type 2 diabetes mellitus with diabetic nephropathy, without long-term current use of insulin [facility])    [facility], Chinmay Frank MD    Office Visit    1 year ago Type 2 diabetes mellitus with diabetic nephropathy, without long-term current use of insulin [facility])    [facility], Chinmay Frank MD    Office Visit    1 year ago [facility] cancer screening    [facility] [address] Harvey Rod MD    Office Visit    1 year ago Arteriovenous fistula stenosis, initial encounter [facility])    Vascular - [address]aud[address] Duenas MD    Office Visit    1 year ago CRI (chronic renal insufficiency), stage 5 [facility])    [facility]    Office Visit

## 2022-02-10 DIAGNOSIS — E11.21 TYPE 2 DIABETES MELLITUS WITH DIABETIC NEPHROPATHY, WITHOUT LONG-TERM CURRENT USE OF INSULIN (HCC): ICD-10-CM

## 2022-02-10 RX ORDER — HYDRALAZINE HYDROCHLORIDE 50 MG/1
50 TABLET, FILM COATED ORAL 3 TIMES DAILY
Qty: 90 TABLET | Refills: 0 | Status: SHIPPED | OUTPATIENT
Start: 2022-02-10 | End: 2022-03-17

## 2022-02-10 NOTE — TELEPHONE ENCOUNTER
Please assist patient with an office visit. 30 day supply sent. Please review Protocol Failed/No Protocol        Requested Prescriptions   Pending Prescriptions Disp Refills    hydrALAZINE 50 MG Oral Tab 270 tablet 1     Sig: Take 1 tablet (50 mg total) by mouth 3 (three) times daily.         Hypertensive Medications Protocol Failed - 2/10/2022  4:12 PM        Failed - CMP or BMP in past 12 months        Failed - Appointment in past 6 or next 3 months        Failed - GFR Non- > 50     Lab Results   Component Value Date    GFRNAA 11 (L) 01/16/2021                           Recent Outpatient Visits              6 months ago Type 2 diabetes mellitus with diabetic nephropathy, without long-term current use of insulin [facility])    [facility], [address], Chinmay Busch MD    Office Visit    1 year ago Type 2 diabetes mellitus with diabetic nephropathy, without long-term current use of insulin [facility])    [address], Chinmay Busch MD    Office Visit    1 year ago Colon cancer screening    [address], [facility] Valeria Gandhi MD    Office Visit    1 year ago Arteriovenous fistula stenosis, initial encounter [facility])    Vascular - Chelsea Gurrola MD    Office Visit    1 year ago CRI (chronic renal insufficiency), stage 5 [facility])    [address], Courtney Boast, MD    Office Visit

## 2022-02-10 NOTE — TELEPHONE ENCOUNTER
Patient calling to request refills for the following medications:    hydrALAZINE 50 MG Oral Tab  furosemide 20 MG Oral Tab [  rosuvastatin 20 MG Oral Tab [    He states that he needs enough for 3 weeks,he  going out of town 2/12/2022

## 2022-02-11 ENCOUNTER — TELEPHONE (OUTPATIENT)
Dept: FAMILY MEDICINE CLINIC | Facility: CLINIC | Age: 52
End: 2022-02-11

## 2022-02-12 RX ORDER — ROSUVASTATIN CALCIUM 20 MG/1
20 TABLET, COATED ORAL NIGHTLY
Qty: 90 TABLET | Refills: 0 | Status: SHIPPED | OUTPATIENT
Start: 2022-02-12 | End: 2022-04-02

## 2022-02-12 RX ORDER — FUROSEMIDE 20 MG/1
20 TABLET ORAL 2 TIMES DAILY
Qty: 60 TABLET | Refills: 0 | Status: SHIPPED | OUTPATIENT
Start: 2022-02-12 | End: 2022-04-02

## 2022-02-14 RX ORDER — FUROSEMIDE 20 MG/1
TABLET ORAL
Qty: 180 TABLET | Refills: 0 | OUTPATIENT
Start: 2022-02-14

## 2022-02-14 NOTE — TELEPHONE ENCOUNTER
Please review; protocol failed/No Protcol    Requested Prescriptions   Pending Prescriptions Disp Refills    FUROSEMIDE 20 MG Oral Tab [Pharmacy Med Name: FUROSEMIDE 20MG TABLETS] 180 tablet 0     Sig: TAKE 1 TABLET(20 MG) BY MOUTH TWICE DAILY        Hypertensive Medications Protocol Failed - 2/12/2022  7:43 AM        Failed - CMP or BMP in past 12 months        Failed - GFR Non- > 50     Lab Results   Component Value Date    GFRNAA 11 (L) 01/16/2021                 Passed - Appointment in past 6 or next 3 months              Recent Outpatient Visits              6 months ago Type 2 diabetes mellitus with diabetic nephropathy, without long-term current use of insulin [facility])    [facility], [address], Chinmay Shaikh MD    Office Visit    1 year ago Type 2 diabetes mellitus with diabetic nephropathy, without long-term current use of insulin [facility])    [employer], [address], Chinmay Shaikh MD    Office Visit    1 year ago Colon cancer screening    [place] [employer], [address], [place] Alvino Jaimes MD    Office Visit    1 year ago Arteriovenous fistula stenosis, initial encounter [facility])    Vascular - [address] Duenas MD    Office Visit    1 year ago CRI (chronic renal insufficiency), stage 5 [facility])    [place] [employer], [address], Thanh Lai MD    Office Visit            Future Appointments         Provider Department Appt Notes    In 2 weeks Haresh Shaikh MD [employer], Chinmay Alvarez Medication follow up  policy informerd

## 2022-03-16 NOTE — TELEPHONE ENCOUNTER
Please review; protocol failed/No Protcol    Requested Prescriptions   Pending Prescriptions Disp Refills    HYDRALAZINE 50 MG Oral Tab [Pharmacy Med Name: HYDRALAZINE  50MG TABLETS(ORANGE)] 90 tablet 0     Sig: TAKE 1 TABLET(50 MG) BY MOUTH THREE TIMES DAILY        Hypertensive Medications Protocol Failed - 3/15/2022  8:16 PM        Failed - CMP or BMP in past 12 months        Failed - GFR Non- > 50     Lab Results   Component Value Date    GFRNAA 11 (L) 01/16/2021                 Passed - Appointment in past 6 or next 3 months           REPAGLINIDE 1 MG Oral Tab [Pharmacy Med Name: REPAGLINIDE 1MG TABLETS] 270 tablet 0     Sig: TAKE 1 TABLET(1 MG) BY MOUTH THREE TIMES DAILY BEFORE MEALS        Diabetes Medication Protocol Failed - 3/15/2022  8:16 PM        Failed - Last A1C < 7.5 and within past 6 months     Lab Results   Component Value Date    A1C 8.6 (A) 08/05/2021               Failed - GFR Non- > 50     Lab Results   Component Value Date    GFRNAA 11 (L) 01/16/2021                 Failed - GFR in the past 12 months        Passed - Appointment in past 6 or next 3 months              Recent Outpatient Visits              7 months ago Type 2 diabetes mellitus with diabetic nephropathy, without long-term current use of insulin [facility])    [facility] [address], Chinmay Cantrell MD    Office Visit    1 year ago Type 2 diabetes mellitus with diabetic nephropathy, without long-term current use of insulin [facility])    [facility], [address], Chinmay Cantrell MD    Office Visit    1 year ago Colon cancer screening    [facility], [address], [facility] April Crouch MD    Office Visit    1 year ago Arteriovenous fistula stenosis, initial encounter [facility])    Vascular - Edwin Amador MD    Office Visit    1 year ago CRI (chronic renal insufficiency), stage 5 [facility])    [facility], [address], Chris Rivera MD Office Visit            Future Appointments         Provider Department Appt Notes    In 1 month Ryland Moon MD [address], [facility] [address] [place] follow up on meds.  pt has insurance and it is under Yan[employer] Deiters pt will bring insruance card and photo id for the change

## 2022-03-17 RX ORDER — REPAGLINIDE 1 MG/1
1 TABLET ORAL
Qty: 30 TABLET | Refills: 0 | Status: SHIPPED | OUTPATIENT
Start: 2022-03-17 | End: 2022-03-28

## 2022-03-17 RX ORDER — HYDRALAZINE HYDROCHLORIDE 50 MG/1
50 TABLET, FILM COATED ORAL 3 TIMES DAILY
Qty: 30 TABLET | Refills: 0 | Status: SHIPPED | OUTPATIENT
Start: 2022-03-17 | End: 2022-03-28

## 2022-03-22 ENCOUNTER — TELEPHONE (OUTPATIENT)
Dept: FAMILY MEDICINE CLINIC | Facility: CLINIC | Age: 52
End: 2022-03-22

## 2022-03-22 NOTE — TELEPHONE ENCOUNTER
Prior authorization form has been filled out for [employer] and faxed to [facility] to [phone].  It can take 1-5 business days for a decision to come back

## 2022-03-24 NOTE — TELEPHONE ENCOUNTER
Dr Kaitlyn Gomes, patient has an appointment already on 04/23/2022, Per patient he can not come in during week days due to he works in [facility] [employer] [employer] [facility] [employer] only Saturday he can come in. Your other Saturday 04/09/2022 is already fully booked. Please advise Thank you. Please reply to pool: EM CC IM FM ALG RHE    .

## 2022-03-25 NOTE — TELEPHONE ENCOUNTER
DR Landis=pended for approval, was sent 2 weeks ago for 10 day supply only,patient has upcomimg appointment on 4/23/22,thanks. Future Appointments   Date Time Provider Holly Mike   4/23/2022  8:45 AM Mary Brennan MD [facility]         Please review; protocol failed/no protocol.      Requested Prescriptions   Pending Prescriptions Disp Refills    HYDRALAZINE 50 MG Oral Tab [Pharmacy Med Name: HYDRALAZINE  50MG TABLETS(ORANGE)] 30 tablet 0     Sig: TAKE 1 TABLET(50 MG) BY MOUTH THREE TIMES DAILY        Hypertensive Medications Protocol Failed - 3/24/2022 11:22 PM        Failed - CMP or BMP in past 12 months        Failed - GFR Non- > 50     Lab Results   Component Value Date    GFRNAA 11 (L) 01/16/2021                 Passed - Appointment in past 6 or next 3 months           REPAGLINIDE 1 MG Oral Tab [Pharmacy Med Name: REPAGLINIDE 1MG TABLETS] 30 tablet 0     Sig: TAKE 1 TABLET(1 MG) BY MOUTH THREE TIMES DAILY BEFORE MEALS        Diabetes Medication Protocol Failed - 3/24/2022 11:22 PM        Failed - Last A1C < 7.5 and within past 6 months     Lab Results   Component Value Date    A1C 8.6 (A) 08/05/2021               Failed - GFR Non- > 50     Lab Results   Component Value Date    GFRNAA 11 (L) 01/16/2021                 Failed - GFR in the past 12 months        Passed - Appointment in past 6 or next 3 months               Recent Outpatient Visits              7 months ago Type 2 diabetes mellitus with diabetic nephropathy, without long-term current use of insulin [facility])    [facility], [address], Chinmay Brennan MD    Office Visit    1 year ago Type 2 diabetes mellitus with diabetic nephropathy, without long-term current use of insulin [facility])    [facility], [facility], [address], Chinmay Brennan MD    Office Visit    1 year ago Colon cancer screening    [facility], [facility], [address], Carlos Whitney MD    Office Visit    1 year ago Arteriovenous fistula stenosis, initial encounter [facility])    Vascular - [address], [place] Najjar, Samer F, MD    Office Visit    1 year ago CRI (chronic renal insufficiency), stage 5 [facility])    [facility], [address], [place] Tessie Babinski, MD    Office Visit             Future Appointments         Provider Department Appt Notes    In 1 month Tessie Babinski, MD [facility], [facility], [address], Chinmay follow up on meds.  pt has insurance and it is under Meli Kern pt will bring insruance card and photo id for the change

## 2022-03-26 ENCOUNTER — TELEPHONE (OUTPATIENT)
Dept: FAMILY MEDICINE CLINIC | Facility: CLINIC | Age: 52
End: 2022-03-26

## 2022-03-26 RX ORDER — HYDRALAZINE HYDROCHLORIDE 50 MG/1
50 TABLET, FILM COATED ORAL 3 TIMES DAILY
Qty: 270 TABLET | Refills: 0 | OUTPATIENT
Start: 2022-03-26

## 2022-03-26 RX ORDER — REPAGLINIDE 1 MG/1
1 TABLET ORAL
Qty: 270 TABLET | Refills: 0 | OUTPATIENT
Start: 2022-03-26

## 2022-03-28 RX ORDER — HYDRALAZINE HYDROCHLORIDE 50 MG/1
50 TABLET, FILM COATED ORAL 3 TIMES DAILY
Qty: 270 TABLET | Refills: 0 | Status: SHIPPED | OUTPATIENT
Start: 2022-03-28 | End: 2022-04-02

## 2022-03-28 RX ORDER — REPAGLINIDE 1 MG/1
1 TABLET ORAL
Qty: 270 TABLET | Refills: 0 | Status: SHIPPED | OUTPATIENT
Start: 2022-03-28 | End: 2022-04-02

## 2022-03-28 NOTE — TELEPHONE ENCOUNTER
Patient has an appointment with Dr. Usman Cottrell on 4/2/22 and requesting refills of 2 medications - pended.

## 2022-04-02 ENCOUNTER — OFFICE VISIT (OUTPATIENT)
Dept: FAMILY MEDICINE CLINIC | Facility: CLINIC | Age: 52
End: 2022-04-02
Payer: MEDICARE

## 2022-04-02 VITALS
SYSTOLIC BLOOD PRESSURE: 161 MMHG | TEMPERATURE: 98 F | WEIGHT: 242 LBS | DIASTOLIC BLOOD PRESSURE: 77 MMHG | HEART RATE: 67 BPM | HEIGHT: 70 IN | BODY MASS INDEX: 34.65 KG/M2

## 2022-04-02 DIAGNOSIS — I10 ESSENTIAL HYPERTENSION: Primary | ICD-10-CM

## 2022-04-02 DIAGNOSIS — E78.2 MIXED HYPERLIPIDEMIA: ICD-10-CM

## 2022-04-02 DIAGNOSIS — E11.21 TYPE 2 DIABETES MELLITUS WITH DIABETIC NEPHROPATHY, WITHOUT LONG-TERM CURRENT USE OF INSULIN (HCC): ICD-10-CM

## 2022-04-02 LAB
CARTRIDGE LOT#: ABNORMAL NUMERIC
HEMOGLOBIN A1C: 9.6 % (ref 4.3–5.6)

## 2022-04-02 PROCEDURE — 99214 OFFICE O/P EST MOD 30 MIN: CPT | Performed by: FAMILY MEDICINE

## 2022-04-02 PROCEDURE — 83036 HEMOGLOBIN GLYCOSYLATED A1C: CPT | Performed by: FAMILY MEDICINE

## 2022-04-02 PROCEDURE — 3046F HEMOGLOBIN A1C LEVEL >9.0%: CPT | Performed by: NURSE PRACTITIONER

## 2022-04-02 RX ORDER — FUROSEMIDE 20 MG/1
20 TABLET ORAL 2 TIMES DAILY
Qty: 180 TABLET | Refills: 0 | Status: SHIPPED | OUTPATIENT
Start: 2022-04-02 | End: 2022-07-01

## 2022-04-02 RX ORDER — HYDRALAZINE HYDROCHLORIDE 50 MG/1
50 TABLET, FILM COATED ORAL 3 TIMES DAILY
Qty: 270 TABLET | Refills: 0 | Status: SHIPPED | OUTPATIENT
Start: 2022-04-02

## 2022-04-02 RX ORDER — ROSUVASTATIN CALCIUM 20 MG/1
20 TABLET, COATED ORAL NIGHTLY
Qty: 90 TABLET | Refills: 0 | Status: SHIPPED | OUTPATIENT
Start: 2022-04-02

## 2022-04-02 RX ORDER — REPAGLINIDE 1 MG/1
1 TABLET ORAL
Qty: 270 TABLET | Refills: 0 | Status: SHIPPED | OUTPATIENT
Start: 2022-04-02

## 2022-04-02 RX ORDER — LIRAGLUTIDE 6 MG/ML
3 INJECTION, SOLUTION SUBCUTANEOUS NIGHTLY
Qty: 18 ML | Refills: 0 | Status: SHIPPED | OUTPATIENT
Start: 2022-04-02

## 2022-04-02 RX ORDER — ROPINIROLE 1 MG/1
1 TABLET, FILM COATED ORAL NIGHTLY
Qty: 90 TABLET | Refills: 0 | Status: SHIPPED | OUTPATIENT
Start: 2022-04-02

## 2022-04-02 RX ORDER — INSULIN DEGLUDEC 200 U/ML
28 INJECTION, SOLUTION SUBCUTANEOUS NIGHTLY
Qty: 18 ML | Refills: 0 | Status: SHIPPED | OUTPATIENT
Start: 2022-04-02

## 2022-04-02 RX ORDER — AMLODIPINE BESYLATE 10 MG/1
10 TABLET ORAL DAILY
Qty: 90 TABLET | Refills: 0 | Status: SHIPPED | OUTPATIENT
Start: 2022-04-02

## 2022-05-21 ENCOUNTER — LAB ENCOUNTER (OUTPATIENT)
Dept: LAB | Age: 52
End: 2022-05-21
Attending: FAMILY MEDICINE
Payer: MEDICARE

## 2022-05-21 ENCOUNTER — OFFICE VISIT (OUTPATIENT)
Dept: FAMILY MEDICINE CLINIC | Facility: CLINIC | Age: 52
End: 2022-05-21
Payer: COMMERCIAL

## 2022-05-21 VITALS
DIASTOLIC BLOOD PRESSURE: 70 MMHG | BODY MASS INDEX: 33.79 KG/M2 | WEIGHT: 236 LBS | HEART RATE: 65 BPM | HEIGHT: 70 IN | SYSTOLIC BLOOD PRESSURE: 138 MMHG

## 2022-05-21 DIAGNOSIS — E66.01 CLASS 2 SEVERE OBESITY DUE TO EXCESS CALORIES WITH SERIOUS COMORBIDITY AND BODY MASS INDEX (BMI) OF 35.0 TO 35.9 IN ADULT (HCC): ICD-10-CM

## 2022-05-21 DIAGNOSIS — E11.65 UNCONTROLLED TYPE 2 DIABETES MELLITUS WITH HYPERGLYCEMIA (HCC): ICD-10-CM

## 2022-05-21 DIAGNOSIS — N18.5 CHRONIC KIDNEY DISEASE, STAGE V (HCC): ICD-10-CM

## 2022-05-21 DIAGNOSIS — E11.21 TYPE 2 DIABETES MELLITUS WITH DIABETIC NEPHROPATHY, WITHOUT LONG-TERM CURRENT USE OF INSULIN (HCC): ICD-10-CM

## 2022-05-21 DIAGNOSIS — E11.65 UNCONTROLLED TYPE 2 DIABETES MELLITUS WITH HYPERGLYCEMIA (HCC): Primary | ICD-10-CM

## 2022-05-21 PROBLEM — E66.812 CLASS 2 SEVERE OBESITY DUE TO EXCESS CALORIES WITH SERIOUS COMORBIDITY AND BODY MASS INDEX (BMI) OF 35.0 TO 35.9 IN ADULT (HCC): Status: ACTIVE | Noted: 2022-05-21

## 2022-05-21 LAB
ALBUMIN SERPL-MCNC: 4 G/DL (ref 3.4–5)
ALBUMIN/GLOB SERPL: 1.1 {RATIO} (ref 1–2)
ALP LIVER SERPL-CCNC: 142 U/L
ALT SERPL-CCNC: 38 U/L
ANION GAP SERPL CALC-SCNC: 7 MMOL/L (ref 0–18)
AST SERPL-CCNC: 34 U/L (ref 15–37)
BILIRUB SERPL-MCNC: 0.6 MG/DL (ref 0.1–2)
BUN BLD-MCNC: 34 MG/DL (ref 7–18)
BUN/CREAT SERPL: 8.2 (ref 10–20)
CALCIUM BLD-MCNC: 10.1 MG/DL (ref 8.5–10.1)
CHLORIDE SERPL-SCNC: 100 MMOL/L (ref 98–112)
CHOLEST SERPL-MCNC: 125 MG/DL (ref ?–200)
CO2 SERPL-SCNC: 32 MMOL/L (ref 21–32)
CREAT BLD-MCNC: 4.17 MG/DL
CREAT UR-SCNC: 111 MG/DL
FASTING PATIENT LIPID ANSWER: YES
FASTING STATUS PATIENT QL REPORTED: YES
GLOBULIN PLAS-MCNC: 3.6 G/DL (ref 2.8–4.4)
GLUCOSE BLD-MCNC: 127 MG/DL (ref 70–99)
HDLC SERPL-MCNC: 38 MG/DL (ref 40–59)
LDLC SERPL CALC-MCNC: 63 MG/DL (ref ?–100)
MICROALBUMIN UR-MCNC: 126 MG/DL
MICROALBUMIN/CREAT 24H UR-RTO: 1135.1 UG/MG (ref ?–30)
NONHDLC SERPL-MCNC: 87 MG/DL (ref ?–130)
OSMOLALITY SERPL CALC.SUM OF ELEC: 297 MOSM/KG (ref 275–295)
POTASSIUM SERPL-SCNC: 3.9 MMOL/L (ref 3.5–5.1)
PROT SERPL-MCNC: 7.6 G/DL (ref 6.4–8.2)
SODIUM SERPL-SCNC: 139 MMOL/L (ref 136–145)
TRIGL SERPL-MCNC: 135 MG/DL (ref 30–149)
VLDLC SERPL CALC-MCNC: 20 MG/DL (ref 0–30)

## 2022-05-21 PROCEDURE — 3008F BODY MASS INDEX DOCD: CPT | Performed by: FAMILY MEDICINE

## 2022-05-21 PROCEDURE — 82043 UR ALBUMIN QUANTITATIVE: CPT

## 2022-05-21 PROCEDURE — 3075F SYST BP GE 130 - 139MM HG: CPT | Performed by: FAMILY MEDICINE

## 2022-05-21 PROCEDURE — 82570 ASSAY OF URINE CREATININE: CPT

## 2022-05-21 PROCEDURE — 3078F DIAST BP <80 MM HG: CPT | Performed by: FAMILY MEDICINE

## 2022-05-21 PROCEDURE — 36415 COLL VENOUS BLD VENIPUNCTURE: CPT

## 2022-05-21 PROCEDURE — 99214 OFFICE O/P EST MOD 30 MIN: CPT | Performed by: FAMILY MEDICINE

## 2022-05-21 PROCEDURE — 3060F POS MICROALBUMINURIA REV: CPT | Performed by: NURSE PRACTITIONER

## 2022-05-21 PROCEDURE — 80061 LIPID PANEL: CPT

## 2022-05-21 PROCEDURE — 80053 COMPREHEN METABOLIC PANEL: CPT

## 2022-05-21 RX ORDER — LIRAGLUTIDE 6 MG/ML
3 INJECTION, SOLUTION SUBCUTANEOUS NIGHTLY
Qty: 18 ML | Refills: 0 | Status: SHIPPED | OUTPATIENT
Start: 2022-05-21

## 2022-05-23 ENCOUNTER — TELEPHONE (OUTPATIENT)
Dept: FAMILY MEDICINE CLINIC | Facility: CLINIC | Age: 52
End: 2022-05-23

## 2022-05-23 NOTE — TELEPHONE ENCOUNTER
Prior Authorization Form has been filed out and faxed to [phone]  It can take 1-5 business days for it to come back.

## 2022-06-01 ENCOUNTER — OFFICE VISIT (OUTPATIENT)
Dept: FAMILY MEDICINE CLINIC | Facility: CLINIC | Age: 52
End: 2022-06-01
Payer: MEDICARE

## 2022-06-01 VITALS
SYSTOLIC BLOOD PRESSURE: 161 MMHG | BODY MASS INDEX: 34.07 KG/M2 | HEIGHT: 70 IN | DIASTOLIC BLOOD PRESSURE: 77 MMHG | HEART RATE: 71 BPM | WEIGHT: 238 LBS

## 2022-06-01 DIAGNOSIS — S76.111D STRAIN OF RIGHT QUADRICEPS, SUBSEQUENT ENCOUNTER: ICD-10-CM

## 2022-06-01 DIAGNOSIS — S43.401D SPRAIN OF RIGHT SHOULDER, UNSPECIFIED SHOULDER SPRAIN TYPE, SUBSEQUENT ENCOUNTER: ICD-10-CM
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
## 2022-06-01 DIAGNOSIS — T14.8XXA ABRASION: ICD-10-CM

## 2022-06-01 PROCEDURE — 99214 OFFICE O/P EST MOD 30 MIN: CPT | Performed by: NURSE PRACTITIONER

## 2022-06-01 PROCEDURE — 3077F SYST BP >= 140 MM HG: CPT | Performed by: NURSE PRACTITIONER

## 2022-06-01 PROCEDURE — 3078F DIAST BP <80 MM HG: CPT | Performed by: NURSE PRACTITIONER

## 2022-06-01 PROCEDURE — 3008F BODY MASS INDEX DOCD: CPT | Performed by: NURSE PRACTITIONER

## 2022-06-01 RX ORDER — HYDROCODONE BITARTRATE AND ACETAMINOPHEN 5; 325 MG/1; MG/1
1 TABLET ORAL EVERY 6 HOURS PRN
COMMUNITY
Start: 2022-05-29

## 2022-06-27 DIAGNOSIS — E78.2 MIXED HYPERLIPIDEMIA: ICD-10-CM

## 2022-06-27 DIAGNOSIS — E11.21 TYPE 2 DIABETES MELLITUS WITH DIABETIC NEPHROPATHY, WITHOUT LONG-TERM CURRENT USE OF INSULIN (HCC): ICD-10-CM

## 2022-06-27 DIAGNOSIS — I10 ESSENTIAL HYPERTENSION: ICD-10-CM

## 2022-06-27 RX ORDER — HYDRALAZINE HYDROCHLORIDE 50 MG/1
TABLET, FILM COATED ORAL
Qty: 270 TABLET | Refills: 0 | Status: SHIPPED | OUTPATIENT
Start: 2022-06-27

## 2022-06-27 RX ORDER — ROPINIROLE 1 MG/1
TABLET, FILM COATED ORAL
Qty: 90 TABLET | Refills: 0 | Status: SHIPPED | OUTPATIENT
Start: 2022-06-27

## 2022-06-27 RX ORDER — AMLODIPINE BESYLATE 10 MG/1
TABLET ORAL
Qty: 90 TABLET | Refills: 0 | Status: SHIPPED | OUTPATIENT
Start: 2022-06-27

## 2022-06-27 RX ORDER — FUROSEMIDE 20 MG/1
TABLET ORAL
Qty: 180 TABLET | Refills: 0 | Status: SHIPPED | OUTPATIENT
Start: 2022-06-27

## 2022-06-27 RX ORDER — ROSUVASTATIN CALCIUM 20 MG/1
TABLET, COATED ORAL
Qty: 90 TABLET | Refills: 0 | Status: SHIPPED | OUTPATIENT
Start: 2022-06-27

## 2022-06-28 DIAGNOSIS — E11.21 TYPE 2 DIABETES MELLITUS WITH DIABETIC NEPHROPATHY, WITHOUT LONG-TERM CURRENT USE OF INSULIN (HCC): ICD-10-CM

## 2022-06-29 RX ORDER — REPAGLINIDE 1 MG/1
TABLET ORAL
Qty: 270 TABLET | Refills: 0 | Status: SHIPPED | OUTPATIENT
Start: 2022-06-29

## 2022-07-02 ENCOUNTER — OFFICE VISIT (OUTPATIENT)
Dept: FAMILY MEDICINE CLINIC | Facility: CLINIC | Age: 52
End: 2022-07-02
Payer: COMMERCIAL

## 2022-07-02 VITALS
WEIGHT: 231.63 LBS | HEIGHT: 70 IN | BODY MASS INDEX: 33.16 KG/M2 | SYSTOLIC BLOOD PRESSURE: 144 MMHG | HEART RATE: 65 BPM | DIASTOLIC BLOOD PRESSURE: 75 MMHG

## 2022-07-02 DIAGNOSIS — S49.91XD INJURY OF RIGHT SHOULDER, SUBSEQUENT ENCOUNTER: ICD-10-CM

## 2022-07-02 DIAGNOSIS — E11.21 TYPE 2 DIABETES MELLITUS WITH DIABETIC NEPHROPATHY, WITHOUT LONG-TERM CURRENT USE OF INSULIN (HCC): Primary | ICD-10-CM

## 2022-07-02 PROBLEM — G20 PARKINSON'S DISEASE: Status: ACTIVE | Noted: 2022-07-02

## 2022-07-02 PROBLEM — G20.A1 PARKINSON'S DISEASE (HCC): Status: ACTIVE | Noted: 2022-07-02

## 2022-07-02 PROBLEM — G20.A1 PARKINSON'S DISEASE: Status: ACTIVE | Noted: 2022-07-02

## 2022-07-02 PROBLEM — G20 PARKINSON'S DISEASE (HCC): Status: ACTIVE | Noted: 2022-07-02

## 2022-07-02 LAB
CARTRIDGE LOT#: ABNORMAL NUMERIC
HEMOGLOBIN A1C: 6.6 % (ref 4.3–5.6)

## 2022-07-02 PROCEDURE — 83036 HEMOGLOBIN GLYCOSYLATED A1C: CPT | Performed by: FAMILY MEDICINE

## 2022-07-02 PROCEDURE — 3008F BODY MASS INDEX DOCD: CPT | Performed by: FAMILY MEDICINE

## 2022-07-02 PROCEDURE — 3078F DIAST BP <80 MM HG: CPT | Performed by: FAMILY MEDICINE

## 2022-07-02 PROCEDURE — 3044F HG A1C LEVEL LT 7.0%: CPT | Performed by: FAMILY MEDICINE

## 2022-07-02 PROCEDURE — 99213 OFFICE O/P EST LOW 20 MIN: CPT | Performed by: FAMILY MEDICINE

## 2022-07-02 PROCEDURE — 3077F SYST BP >= 140 MM HG: CPT | Performed by: FAMILY MEDICINE

## 2022-07-02 RX ORDER — FERRIC CITRATE 210 MG/1
TABLET, COATED ORAL
COMMUNITY
Start: 2022-04-19

## 2022-07-02 RX ORDER — LIRAGLUTIDE 6 MG/ML
1.8 INJECTION SUBCUTANEOUS NIGHTLY
Qty: 5 EACH | Refills: 3 | Status: SHIPPED | OUTPATIENT
Start: 2022-07-02

## 2022-07-02 RX ORDER — LINAGLIPTIN 5 MG/1
5 TABLET, FILM COATED ORAL DAILY
Qty: 90 TABLET | Refills: 2 | Status: SHIPPED | OUTPATIENT
Start: 2022-07-02

## 2022-07-02 RX ORDER — CINACALCET 60 MG/1
60 TABLET, FILM COATED ORAL DAILY
COMMUNITY
Start: 2021-07-27 | End: 2022-07-02

## 2022-07-02 RX ORDER — FERRIC CITRATE 210 MG/1
2 TABLET, COATED ORAL
COMMUNITY
Start: 2021-02-17 | End: 2022-07-02

## 2022-07-07 ENCOUNTER — OFFICE VISIT (OUTPATIENT)
Dept: ORTHOPEDICS CLINIC | Facility: CLINIC | Age: 52
End: 2022-07-07
Payer: COMMERCIAL

## 2022-07-07 ENCOUNTER — HOSPITAL ENCOUNTER (OUTPATIENT)
Dept: GENERAL RADIOLOGY | Facility: HOSPITAL | Age: 52
Discharge: HOME | End: 2022-07-07
Attending: ORTHOPAEDIC SURGERY
Payer: COMMERCIAL

## 2022-07-07 DIAGNOSIS — S43.401A SPRAIN OF RIGHT SHOULDER, UNSPECIFIED SHOULDER SPRAIN TYPE, INITIAL ENCOUNTER: ICD-10-CM

## 2022-07-07 DIAGNOSIS — S46.011A TRAUMATIC TEAR OF RIGHT ROTATOR CUFF, UNSPECIFIED TEAR EXTENT, INITIAL ENCOUNTER: Primary | ICD-10-CM

## 2022-07-07 PROCEDURE — 99244 OFF/OP CNSLTJ NEW/EST MOD 40: CPT | Performed by: ORTHOPAEDIC SURGERY

## 2022-07-07 PROCEDURE — 73030 X-RAY EXAM OF SHOULDER: CPT | Performed by: ORTHOPAEDIC SURGERY

## 2022-07-12 ENCOUNTER — TELEPHONE (OUTPATIENT)
Dept: ORTHOPEDICS CLINIC | Facility: CLINIC | Age: 52
End: 2022-07-12

## 2022-07-19 ENCOUNTER — HOSPITAL ENCOUNTER (OUTPATIENT)
Dept: MRI IMAGING | Age: 52
Discharge: HOME | End: 2022-07-19
Attending: ORTHOPAEDIC SURGERY
Payer: MEDICARE

## 2022-07-19 DIAGNOSIS — S46.011A TRAUMATIC TEAR OF RIGHT ROTATOR CUFF, UNSPECIFIED TEAR EXTENT, INITIAL ENCOUNTER: ICD-10-CM

## 2022-07-19 PROCEDURE — 73221 MRI JOINT UPR EXTREM W/O DYE: CPT | Performed by: ORTHOPAEDIC SURGERY

## 2022-08-18 ENCOUNTER — OFFICE VISIT (OUTPATIENT)
Dept: ORTHOPEDICS CLINIC | Facility: CLINIC | Age: 52
End: 2022-08-18
Payer: COMMERCIAL

## 2022-08-18 DIAGNOSIS — S46.011D TRAUMATIC COMPLETE TEAR OF RIGHT ROTATOR CUFF, SUBSEQUENT ENCOUNTER: Primary | ICD-10-CM

## 2022-08-18 PROCEDURE — 99213 OFFICE O/P EST LOW 20 MIN: CPT | Performed by: ORTHOPAEDIC SURGERY

## 2022-08-23 ENCOUNTER — TELEPHONE (OUTPATIENT)
Dept: ORTHOPEDICS CLINIC | Facility: CLINIC | Age: 52
End: 2022-08-23

## 2022-08-23 NOTE — TELEPHONE ENCOUNTER
Type of surgery:  Right Shoulder Arthroscopy, Rotator Cuff Repair  Date: 10/31/22  Location: [facility]  Medical Clearance:      *Medical: Yes      *Dental: No      *Other:  Prior Authorization Status: Pending  Workers Comp:  [place]act[place]/Yvon:  [place]: Yes  POV: 11/9/22

## 2022-08-23 NOTE — TELEPHONE ENCOUNTER
Rajendra Gone called back and chose 10/31 for his surgery date. He understand that he must have Medical clearance within 30 days prior to surgery.

## 2022-08-23 NOTE — TELEPHONE ENCOUNTER
Elliott James yesterday to offer surgery dates. He wants new dates because both dates that were offered are on a Monday and he has dialysis on Mondays. Attempted to call him back today to inform him that Dr. Janae Benson only does surgery at [address] [place] [address] on Mondays and [facility] does not accept his insurance. I was not able to leave a message due to his voicemail not setup.

## 2022-09-25 DIAGNOSIS — I10 ESSENTIAL HYPERTENSION: ICD-10-CM

## 2022-09-25 DIAGNOSIS — E78.2 MIXED HYPERLIPIDEMIA: ICD-10-CM

## 2022-09-25 DIAGNOSIS — E11.21 TYPE 2 DIABETES MELLITUS WITH DIABETIC NEPHROPATHY, WITHOUT LONG-TERM CURRENT USE OF INSULIN (HCC): ICD-10-CM

## 2022-09-26 RX ORDER — FUROSEMIDE 20 MG/1
20 TABLET ORAL 2 TIMES DAILY
Qty: 180 TABLET | Refills: 0 | Status: SHIPPED | OUTPATIENT
Start: 2022-09-26

## 2022-09-26 RX ORDER — ROSUVASTATIN CALCIUM 20 MG/1
20 TABLET, COATED ORAL NIGHTLY
Qty: 90 TABLET | Refills: 1 | Status: SHIPPED | OUTPATIENT
Start: 2022-09-26

## 2022-09-26 RX ORDER — HYDRALAZINE HYDROCHLORIDE 50 MG/1
50 TABLET, FILM COATED ORAL 3 TIMES DAILY
Qty: 270 TABLET | Refills: 1 | Status: SHIPPED | OUTPATIENT
Start: 2022-09-26

## 2022-09-26 RX ORDER — AMLODIPINE BESYLATE 10 MG/1
10 TABLET ORAL DAILY
Qty: 90 TABLET | Refills: 1 | Status: SHIPPED | OUTPATIENT
Start: 2022-09-26

## 2022-09-26 RX ORDER — ROPINIROLE 1 MG/1
1 TABLET, FILM COATED ORAL NIGHTLY
Qty: 90 TABLET | Refills: 0 | Status: SHIPPED | OUTPATIENT
Start: 2022-09-26
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
## 2022-09-27 ENCOUNTER — MED REC SCAN ONLY (OUTPATIENT)
Dept: FAMILY MEDICINE CLINIC | Facility: CLINIC | Age: 52
End: 2022-09-27

## 2022-10-05 ENCOUNTER — TELEPHONE (OUTPATIENT)
Dept: FAMILY MEDICINE CLINIC | Facility: CLINIC | Age: 52
End: 2022-10-05

## 2022-10-05 DIAGNOSIS — E11.21 TYPE 2 DIABETES MELLITUS WITH DIABETIC NEPHROPATHY, WITHOUT LONG-TERM CURRENT USE OF INSULIN (HCC): ICD-10-CM

## 2022-10-05 NOTE — TELEPHONE ENCOUNTER
Current Outpatient Medications:   TRESIBA FLEXTOUCH PEN (U-200) INJ 3 ML  INJECT 28 UNITS UNDER THE SKIN NIGHTLY

## 2022-10-06 RX ORDER — INSULIN DEGLUDEC 200 U/ML
28 INJECTION, SOLUTION SUBCUTANEOUS NIGHTLY
Qty: 18 ML | Refills: 0 | Status: SHIPPED | OUTPATIENT
Start: 2022-10-06 | End: 2022-10-07
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
## 2022-10-07 RX ORDER — INSULIN DEGLUDEC 200 U/ML
28 INJECTION, SOLUTION SUBCUTANEOUS NIGHTLY
Qty: 18 ML | Refills: 0 | Status: SHIPPED | OUTPATIENT
Start: 2022-10-07

## 2022-10-07 NOTE — TELEPHONE ENCOUNTER
Pt is calling for status of his medication refill request. Pt stated that he is out of medication. Pt can be reached at [phone]. Pt does have an appt scheduled with Dr. Ugo Myers on 10-22.

## 2022-10-07 NOTE — TELEPHONE ENCOUNTER
Refilled yesterday. Patient notified, states he moved and need the prescription to the [facility] in [place].  Resent prescription to patient preferred pharmacy,

## 2022-10-14 NOTE — TELEPHONE ENCOUNTER
Called patient to remind him that he needs medical clearance.  [facility]

## 2022-10-22 ENCOUNTER — OFFICE VISIT (OUTPATIENT)
Dept: FAMILY MEDICINE CLINIC | Facility: CLINIC | Age: 52
End: 2022-10-22
Payer: COMMERCIAL

## 2022-10-22 VITALS
DIASTOLIC BLOOD PRESSURE: 78 MMHG | SYSTOLIC BLOOD PRESSURE: 139 MMHG | WEIGHT: 231.38 LBS | HEART RATE: 69 BPM | BODY MASS INDEX: 33.12 KG/M2 | HEIGHT: 70 IN

## 2022-10-22 DIAGNOSIS — E78.2 MIXED HYPERLIPIDEMIA: ICD-10-CM

## 2022-10-22 DIAGNOSIS — E11.21 TYPE 2 DIABETES MELLITUS WITH DIABETIC NEPHROPATHY, WITHOUT LONG-TERM CURRENT USE OF INSULIN (HCC): Primary | ICD-10-CM

## 2022-10-22 DIAGNOSIS — I10 ESSENTIAL HYPERTENSION: ICD-10-CM

## 2022-10-22 PROCEDURE — 3075F SYST BP GE 130 - 139MM HG: CPT | Performed by: FAMILY MEDICINE

## 2022-10-22 PROCEDURE — 99213 OFFICE O/P EST LOW 20 MIN: CPT | Performed by: FAMILY MEDICINE

## 2022-10-22 PROCEDURE — 3008F BODY MASS INDEX DOCD: CPT | Performed by: FAMILY MEDICINE

## 2022-10-22 PROCEDURE — 3078F DIAST BP <80 MM HG: CPT | Performed by: FAMILY MEDICINE

## 2022-10-22 RX ORDER — INSULIN DEGLUDEC 200 U/ML
28 INJECTION, SOLUTION SUBCUTANEOUS NIGHTLY
Qty: 18 ML | Refills: 0 | Status: SHIPPED | OUTPATIENT
Start: 2022-10-22

## 2022-10-22 RX ORDER — FUROSEMIDE 20 MG/1
20 TABLET ORAL 2 TIMES DAILY
Qty: 180 TABLET | Refills: 0 | Status: SHIPPED | OUTPATIENT
Start: 2022-10-22

## 2022-10-22 RX ORDER — LINAGLIPTIN 5 MG/1
5 TABLET, FILM COATED ORAL DAILY
Qty: 90 TABLET | Refills: 2 | Status: SHIPPED | OUTPATIENT
Start: 2022-10-22

## 2022-10-22 RX ORDER — HYDRALAZINE HYDROCHLORIDE 50 MG/1
50 TABLET, FILM COATED ORAL 3 TIMES DAILY
Qty: 270 TABLET | Refills: 1 | Status: SHIPPED | OUTPATIENT
Start: 2022-10-22

## 2022-10-22 RX ORDER — AMLODIPINE BESYLATE 10 MG/1
10 TABLET ORAL DAILY
Qty: 90 TABLET | Refills: 1 | Status: SHIPPED | OUTPATIENT
Start: 2022-10-22

## 2022-10-22 RX ORDER — ROSUVASTATIN CALCIUM 20 MG/1
20 TABLET, COATED ORAL NIGHTLY
Qty: 90 TABLET | Refills: 1 | Status: SHIPPED | OUTPATIENT
Start: 2022-10-22

## 2022-10-22 RX ORDER — REPAGLINIDE 1 MG/1
1 TABLET ORAL
Qty: 270 TABLET | Refills: 0 | Status: SHIPPED | OUTPATIENT
Start: 2022-10-22

## 2022-10-22 RX ORDER — LIRAGLUTIDE 6 MG/ML
1.8 INJECTION SUBCUTANEOUS NIGHTLY
Qty: 5 EACH | Refills: 3 | Status: SHIPPED | OUTPATIENT
Start: 2022-10-22

## 2022-10-24 NOTE — TELEPHONE ENCOUNTER
FYI: Transferred call to [facility] due to per patient he just saw doctor Saturday 10/22/2022.

## 2022-10-24 NOTE — TELEPHONE ENCOUNTER
Dr Ugo Myers,  Patient was seen in your office on 10/22/22. Patient is having surgery with Dr Estuardo Roth on 10/31/22. Patient was informed to let you know that he needed medical clearance. We need CBC/CMP and EKG. If you need anything please let me know.    Thank you [facility]

## 2022-10-25 NOTE — TELEPHONE ENCOUNTER
LMTCB    Patient needs to schedule a pre-op exam as soon as possible as he is scheduled for surgery on 10/31. Please schedule pre-op when call is returned in order to get clearance from PCP.

## 2022-10-26 ENCOUNTER — LAB ENCOUNTER (OUTPATIENT)
Dept: LAB | Age: 52
End: 2022-10-26
Attending: FAMILY MEDICINE
Payer: COMMERCIAL

## 2022-10-27 ENCOUNTER — OFFICE VISIT (OUTPATIENT)
Dept: FAMILY MEDICINE CLINIC | Facility: CLINIC | Age: 52
End: 2022-10-27
Payer: COMMERCIAL

## 2022-10-27 ENCOUNTER — LAB ENCOUNTER (OUTPATIENT)
Dept: LAB | Age: 52
End: 2022-10-27
Attending: FAMILY MEDICINE
Payer: COMMERCIAL

## 2022-10-27 ENCOUNTER — EKG ENCOUNTER (OUTPATIENT)
Dept: LAB | Age: 52
End: 2022-10-27
Attending: FAMILY MEDICINE
Payer: COMMERCIAL

## 2022-10-27 VITALS
SYSTOLIC BLOOD PRESSURE: 120 MMHG | WEIGHT: 235.19 LBS | DIASTOLIC BLOOD PRESSURE: 64 MMHG | HEART RATE: 71 BPM | BODY MASS INDEX: 33.67 KG/M2 | HEIGHT: 70 IN

## 2022-10-27 DIAGNOSIS — Z01.818 PREOPERATIVE CLEARANCE: ICD-10-CM

## 2022-10-27 DIAGNOSIS — S46.011D TRAUMATIC TEAR OF RIGHT ROTATOR CUFF, UNSPECIFIED TEAR EXTENT, SUBSEQUENT ENCOUNTER: Primary | ICD-10-CM

## 2022-10-27 LAB
ALBUMIN SERPL-MCNC: 3.9 G/DL (ref 3.4–5)
ALBUMIN/GLOB SERPL: 1.1 {RATIO} (ref 1–2)
ALP LIVER SERPL-CCNC: 253 U/L
ALT SERPL-CCNC: 54 U/L
ANION GAP SERPL CALC-SCNC: 6 MMOL/L (ref 0–18)
AST SERPL-CCNC: 27 U/L (ref 15–37)
BASOPHILS # BLD AUTO: 0.09 X10(3) UL (ref 0–0.2)
BASOPHILS NFR BLD AUTO: 1.1 %
BILIRUB SERPL-MCNC: 0.4 MG/DL (ref 0.1–2)
BUN BLD-MCNC: 35 MG/DL (ref 7–18)
BUN/CREAT SERPL: 6.4 (ref 10–20)
CALCIUM BLD-MCNC: 9.1 MG/DL (ref 8.5–10.1)
CHLORIDE SERPL-SCNC: 97 MMOL/L (ref 98–112)
CO2 SERPL-SCNC: 36 MMOL/L (ref 21–32)
CREAT BLD-MCNC: 5.5 MG/DL
DEPRECATED RDW RBC AUTO: 43.5 FL (ref 35.1–46.3)
EOSINOPHIL # BLD AUTO: 0.24 X10(3) UL (ref 0–0.7)
EOSINOPHIL NFR BLD AUTO: 2.9 %
ERYTHROCYTE [DISTWIDTH] IN BLOOD BY AUTOMATED COUNT: 12.9 % (ref 11–15)
FASTING STATUS PATIENT QL REPORTED: NO
GFR SERPLBLD BASED ON 1.73 SQ M-ARVRAT: 12 ML/MIN/1.73M2 (ref 60–?)
GLOBULIN PLAS-MCNC: 3.6 G/DL (ref 2.8–4.4)
GLUCOSE BLD-MCNC: 173 MG/DL (ref 70–99)
HCT VFR BLD AUTO: 41.3 %
HGB BLD-MCNC: 13.2 G/DL
IMM GRANULOCYTES # BLD AUTO: 0.03 X10(3) UL (ref 0–1)
IMM GRANULOCYTES NFR BLD: 0.4 %
LYMPHOCYTES # BLD AUTO: 1.9 X10(3) UL (ref 1–4)
LYMPHOCYTES NFR BLD AUTO: 22.7 %
MCH RBC QN AUTO: 29.5 PG (ref 26–34)
MCHC RBC AUTO-ENTMCNC: 32 G/DL (ref 31–37)
MCV RBC AUTO: 92.4 FL
MONOCYTES # BLD AUTO: 0.72 X10(3) UL (ref 0.1–1)
MONOCYTES NFR BLD AUTO: 8.6 %
NEUTROPHILS # BLD AUTO: 5.4 X10 (3) UL (ref 1.5–7.7)
NEUTROPHILS # BLD AUTO: 5.4 X10(3) UL (ref 1.5–7.7)
NEUTROPHILS NFR BLD AUTO: 64.3 %
OSMOLALITY SERPL CALC.SUM OF ELEC: 300 MOSM/KG (ref 275–295)
PLATELET # BLD AUTO: 230 10(3)UL (ref 150–450)
POTASSIUM SERPL-SCNC: 3.8 MMOL/L (ref 3.5–5.1)
PROT SERPL-MCNC: 7.5 G/DL (ref 6.4–8.2)
RBC # BLD AUTO: 4.47 X10(6)UL
SODIUM SERPL-SCNC: 139 MMOL/L (ref 136–145)
WBC # BLD AUTO: 8.4 X10(3) UL (ref 4–11)

## 2022-10-27 PROCEDURE — 93010 ELECTROCARDIOGRAM REPORT: CPT | Performed by: FAMILY MEDICINE

## 2022-10-27 PROCEDURE — 3008F BODY MASS INDEX DOCD: CPT | Performed by: FAMILY MEDICINE

## 2022-10-27 PROCEDURE — 99214 OFFICE O/P EST MOD 30 MIN: CPT | Performed by: FAMILY MEDICINE

## 2022-10-27 PROCEDURE — 3074F SYST BP LT 130 MM HG: CPT | Performed by: FAMILY MEDICINE

## 2022-10-27 PROCEDURE — 3078F DIAST BP <80 MM HG: CPT | Performed by: FAMILY MEDICINE

## 2022-10-27 PROCEDURE — 93005 ELECTROCARDIOGRAM TRACING: CPT

## 2022-10-27 PROCEDURE — 80053 COMPREHEN METABOLIC PANEL: CPT

## 2022-10-27 PROCEDURE — 36415 COLL VENOUS BLD VENIPUNCTURE: CPT

## 2022-10-27 PROCEDURE — 85025 COMPLETE CBC W/AUTO DIFF WBC: CPT

## 2022-10-28 ENCOUNTER — LAB ENCOUNTER (OUTPATIENT)
Dept: LAB | Age: 52
End: 2022-10-28
Attending: ORTHOPAEDIC SURGERY
Payer: COMMERCIAL

## 2022-10-28 DIAGNOSIS — Z01.818 PREOPERATIVE TESTING: ICD-10-CM

## 2022-10-28 RX ORDER — SODIUM CHLORIDE, SODIUM LACTATE, POTASSIUM CHLORIDE, CALCIUM CHLORIDE 600; 310; 30; 20 MG/100ML; MG/100ML; MG/100ML; MG/100ML
INJECTION, SOLUTION INTRAVENOUS CONTINUOUS
Status: CANCELLED | OUTPATIENT
Start: 2022-10-28

## 2022-10-29 LAB — SARS-COV-2 RNA RESP QL NAA+PROBE: NOT DETECTED

## 2022-10-31 ENCOUNTER — ANESTHESIA (OUTPATIENT)
Dept: SURGERY | Facility: HOSPITAL | Age: 52
End: 2022-10-31
Payer: COMMERCIAL

## 2022-10-31 ENCOUNTER — ANESTHESIA EVENT (OUTPATIENT)
Dept: SURGERY | Facility: HOSPITAL | Age: 52
End: 2022-10-31
Payer: COMMERCIAL

## 2022-10-31 ENCOUNTER — HOSPITAL ENCOUNTER (OUTPATIENT)
Facility: HOSPITAL | Age: 52
Setting detail: HOSPITAL OUTPATIENT SURGERY
Discharge: HOME | End: 2022-10-31
Attending: ORTHOPAEDIC SURGERY | Admitting: ORTHOPAEDIC SURGERY
Payer: COMMERCIAL

## 2022-10-31 VITALS
HEIGHT: 70 IN | BODY MASS INDEX: 34.5 KG/M2 | DIASTOLIC BLOOD PRESSURE: 73 MMHG | WEIGHT: 241 LBS | RESPIRATION RATE: 12 BRPM | TEMPERATURE: 97 F | SYSTOLIC BLOOD PRESSURE: 155 MMHG | OXYGEN SATURATION: 97 % | HEART RATE: 64 BPM

## 2022-10-31 DIAGNOSIS — S46.011D TRAUMATIC COMPLETE TEAR OF RIGHT ROTATOR CUFF, SUBSEQUENT ENCOUNTER: ICD-10-CM

## 2022-10-31 DIAGNOSIS — Z01.818 PREOPERATIVE TESTING: Primary | ICD-10-CM

## 2022-10-31 LAB
GLUCOSE BLDC GLUCOMTR-MCNC: 112 MG/DL (ref 70–99)
GLUCOSE BLDC GLUCOMTR-MCNC: 76 MG/DL (ref 70–99)
POTASSIUM SERPL-SCNC: 4.3 MMOL/L (ref 3.5–5.1)

## 2022-10-31 PROCEDURE — 82962 GLUCOSE BLOOD TEST: CPT

## 2022-10-31 PROCEDURE — 84132 ASSAY OF SERUM POTASSIUM: CPT | Performed by: ORTHOPAEDIC SURGERY

## 2022-10-31 PROCEDURE — 0RNJ4ZZ RELEASE RIGHT SHOULDER JOINT, PERCUTANEOUS ENDOSCOPIC APPROACH: ICD-10-PCS | Performed by: ORTHOPAEDIC SURGERY

## 2022-10-31 PROCEDURE — 0LM14ZZ REATTACHMENT OF RIGHT SHOULDER TENDON, PERCUTANEOUS ENDOSCOPIC APPROACH: ICD-10-PCS | Performed by: ORTHOPAEDIC SURGERY

## 2022-10-31 PROCEDURE — 64415 NJX AA&/STRD BRCH PLXS IMG: CPT | Performed by: ORTHOPAEDIC SURGERY

## 2022-10-31 PROCEDURE — 76942 ECHO GUIDE FOR BIOPSY: CPT | Performed by: ORTHOPAEDIC SURGERY

## 2022-10-31 DEVICE — SPEEDBRG IMP SYS W/BIO-COMP SWVLK
Type: IMPLANTABLE DEVICE | Site: SHOULDER | Status: FUNCTIONAL
Brand: ARTHREX®

## 2022-10-31 RX ORDER — GLYCOPYRROLATE 0.2 MG/ML
INJECTION, SOLUTION INTRAMUSCULAR; INTRAVENOUS AS NEEDED
Status: DISCONTINUED | OUTPATIENT
Start: 2022-10-31 | End: 2022-10-31 | Stop reason: SURG

## 2022-10-31 RX ORDER — NICOTINE POLACRILEX 4 MG
15 LOZENGE BUCCAL
Status: DISCONTINUED | OUTPATIENT
Start: 2022-10-31 | End: 2022-10-31 | Stop reason: HOSPADM

## 2022-10-31 RX ORDER — HYDROMORPHONE HYDROCHLORIDE 1 MG/ML
0.2 INJECTION, SOLUTION INTRAMUSCULAR; INTRAVENOUS; SUBCUTANEOUS EVERY 5 MIN PRN
Status: DISCONTINUED | OUTPATIENT
Start: 2022-10-31 | End: 2022-10-31

## 2022-10-31 RX ORDER — NICOTINE POLACRILEX 4 MG
30 LOZENGE BUCCAL
Status: DISCONTINUED | OUTPATIENT
Start: 2022-10-31 | End: 2022-10-31 | Stop reason: HOSPADM

## 2022-10-31 RX ORDER — MORPHINE SULFATE 4 MG/ML
4 INJECTION, SOLUTION INTRAMUSCULAR; INTRAVENOUS EVERY 10 MIN PRN
Status: DISCONTINUED | OUTPATIENT
Start: 2022-10-31 | End: 2022-10-31

## 2022-10-31 RX ORDER — INSULIN ASPART 100 [IU]/ML
INJECTION, SOLUTION INTRAVENOUS; SUBCUTANEOUS ONCE
Status: DISCONTINUED | OUTPATIENT
Start: 2022-10-31 | End: 2022-10-31

## 2022-10-31 RX ORDER — HYDRALAZINE HYDROCHLORIDE 20 MG/ML
5 INJECTION INTRAMUSCULAR; INTRAVENOUS ONCE
Status: COMPLETED | OUTPATIENT
Start: 2022-10-31 | End: 2022-10-31

## 2022-10-31 RX ORDER — EPHEDRINE SULFATE 50 MG/ML
INJECTION, SOLUTION INTRAVENOUS AS NEEDED
Status: DISCONTINUED | OUTPATIENT
Start: 2022-10-31 | End: 2022-10-31 | Stop reason: SURG

## 2022-10-31 RX ORDER — ONDANSETRON 2 MG/ML
4 INJECTION INTRAMUSCULAR; INTRAVENOUS EVERY 6 HOURS PRN
Status: DISCONTINUED | OUTPATIENT
Start: 2022-10-31 | End: 2022-10-31

## 2022-10-31 RX ORDER — HYDROMORPHONE HYDROCHLORIDE 1 MG/ML
0.4 INJECTION, SOLUTION INTRAMUSCULAR; INTRAVENOUS; SUBCUTANEOUS EVERY 5 MIN PRN
Status: DISCONTINUED | OUTPATIENT
Start: 2022-10-31 | End: 2022-10-31

## 2022-10-31 RX ORDER — METOCLOPRAMIDE 10 MG/1
10 TABLET ORAL ONCE
Status: COMPLETED | OUTPATIENT
Start: 2022-10-31 | End: 2022-10-31

## 2022-10-31 RX ORDER — ROPIVACAINE HYDROCHLORIDE 5 MG/ML
INJECTION, SOLUTION EPIDURAL; INFILTRATION; PERINEURAL AS NEEDED
Status: DISCONTINUED | OUTPATIENT
Start: 2022-10-31 | End: 2022-10-31 | Stop reason: SURG

## 2022-10-31 RX ORDER — MIDAZOLAM HYDROCHLORIDE 1 MG/ML
INJECTION INTRAMUSCULAR; INTRAVENOUS AS NEEDED
Status: DISCONTINUED | OUTPATIENT
Start: 2022-10-31 | End: 2022-10-31 | Stop reason: SURG

## 2022-10-31 RX ORDER — LABETALOL HYDROCHLORIDE 5 MG/ML
5 INJECTION, SOLUTION INTRAVENOUS EVERY 5 MIN PRN
Status: DISCONTINUED | OUTPATIENT
Start: 2022-10-31 | End: 2022-10-31

## 2022-10-31 RX ORDER — PHENYLEPHRINE HCL 10 MG/ML
VIAL (ML) INJECTION AS NEEDED
Status: DISCONTINUED | OUTPATIENT
Start: 2022-10-31 | End: 2022-10-31 | Stop reason: SURG

## 2022-10-31 RX ORDER — ACETAMINOPHEN 500 MG
1000 TABLET ORAL ONCE
Status: COMPLETED | OUTPATIENT
Start: 2022-10-31 | End: 2022-10-31

## 2022-10-31 RX ORDER — OXYCODONE HYDROCHLORIDE AND ACETAMINOPHEN 5; 325 MG/1; MG/1
1-2 TABLET ORAL EVERY 4 HOURS PRN
Qty: 30 TABLET | Refills: 0 | Status: SHIPPED | OUTPATIENT
Start: 2022-10-31

## 2022-10-31 RX ORDER — CEFAZOLIN SODIUM/WATER 2 G/20 ML
2 SYRINGE (ML) INTRAVENOUS
Status: COMPLETED | OUTPATIENT
Start: 2022-10-31 | End: 2022-10-31

## 2022-10-31 RX ORDER — SODIUM CHLORIDE 9 MG/ML
INJECTION, SOLUTION INTRAVENOUS CONTINUOUS
Status: DISCONTINUED | OUTPATIENT
Start: 2022-10-31 | End: 2022-10-31

## 2022-10-31 RX ORDER — DEXAMETHASONE SODIUM PHOSPHATE 10 MG/ML
INJECTION, SOLUTION INTRAMUSCULAR; INTRAVENOUS AS NEEDED
Status: DISCONTINUED | OUTPATIENT
Start: 2022-10-31 | End: 2022-10-31 | Stop reason: SURG

## 2022-10-31 RX ORDER — ONDANSETRON 2 MG/ML
INJECTION INTRAMUSCULAR; INTRAVENOUS AS NEEDED
Status: DISCONTINUED | OUTPATIENT
Start: 2022-10-31 | End: 2022-10-31 | Stop reason: SURG

## 2022-10-31 RX ORDER — ROCURONIUM BROMIDE 10 MG/ML
INJECTION, SOLUTION INTRAVENOUS AS NEEDED
Status: DISCONTINUED | OUTPATIENT
Start: 2022-10-31 | End: 2022-10-31 | Stop reason: SURG

## 2022-10-31 RX ORDER — LIDOCAINE HYDROCHLORIDE 10 MG/ML
INJECTION, SOLUTION EPIDURAL; INFILTRATION; INTRACAUDAL; PERINEURAL AS NEEDED
Status: DISCONTINUED | OUTPATIENT
Start: 2022-10-31 | End: 2022-10-31 | Stop reason: SURG

## 2022-10-31 RX ORDER — SODIUM CHLORIDE, SODIUM LACTATE, POTASSIUM CHLORIDE, CALCIUM CHLORIDE 600; 310; 30; 20 MG/100ML; MG/100ML; MG/100ML; MG/100ML
INJECTION, SOLUTION INTRAVENOUS CONTINUOUS
Status: DISCONTINUED | OUTPATIENT
Start: 2022-10-31 | End: 2022-10-31

## 2022-10-31 RX ORDER — HYDROMORPHONE HYDROCHLORIDE 1 MG/ML
0.6 INJECTION, SOLUTION INTRAMUSCULAR; INTRAVENOUS; SUBCUTANEOUS EVERY 5 MIN PRN
Status: DISCONTINUED | OUTPATIENT
Start: 2022-10-31 | End: 2022-10-31

## 2022-10-31 RX ORDER — FAMOTIDINE 20 MG/1
20 TABLET, FILM COATED ORAL ONCE
Status: COMPLETED | OUTPATIENT
Start: 2022-10-31 | End: 2022-10-31

## 2022-10-31 RX ORDER — OXYCODONE HYDROCHLORIDE AND ACETAMINOPHEN 5; 325 MG/1; MG/1
1-2 TABLET ORAL EVERY 4 HOURS PRN
Qty: 30 TABLET | Refills: 0 | Status: SHIPPED | OUTPATIENT
Start: 2022-10-31 | End: 2022-10-31

## 2022-10-31 RX ORDER — LIDOCAINE HYDROCHLORIDE 40 MG/ML
SOLUTION TOPICAL AS NEEDED
Status: DISCONTINUED | OUTPATIENT
Start: 2022-10-31 | End: 2022-10-31 | Stop reason: SURG

## 2022-10-31 RX ORDER — NALOXONE HYDROCHLORIDE 0.4 MG/ML
80 INJECTION, SOLUTION INTRAMUSCULAR; INTRAVENOUS; SUBCUTANEOUS AS NEEDED
Status: DISCONTINUED | OUTPATIENT
Start: 2022-10-31 | End: 2022-10-31

## 2022-10-31 RX ORDER — CELECOXIB 100 MG/1
100 CAPSULE ORAL
Status: COMPLETED | OUTPATIENT
Start: 2022-10-31 | End: 2022-10-31

## 2022-10-31 RX ORDER — DEXTROSE MONOHYDRATE 25 G/50ML
50 INJECTION, SOLUTION INTRAVENOUS
Status: DISCONTINUED | OUTPATIENT
Start: 2022-10-31 | End: 2022-10-31 | Stop reason: HOSPADM

## 2022-10-31 RX ORDER — PROCHLORPERAZINE EDISYLATE 5 MG/ML
5 INJECTION INTRAMUSCULAR; INTRAVENOUS EVERY 8 HOURS PRN
Status: DISCONTINUED | OUTPATIENT
Start: 2022-10-31 | End: 2022-10-31

## 2022-10-31 RX ORDER — NEOSTIGMINE METHYLSULFATE 1 MG/ML
INJECTION, SOLUTION INTRAVENOUS AS NEEDED
Status: DISCONTINUED | OUTPATIENT
Start: 2022-10-31 | End: 2022-10-31 | Stop reason: SURG

## 2022-10-31 RX ORDER — OXYCODONE HCL 10 MG/1
10 TABLET, FILM COATED, EXTENDED RELEASE ORAL
Status: COMPLETED | OUTPATIENT
Start: 2022-10-31 | End: 2022-10-31

## 2022-10-31 RX ORDER — MORPHINE SULFATE 10 MG/ML
6 INJECTION, SOLUTION INTRAMUSCULAR; INTRAVENOUS EVERY 10 MIN PRN
Status: DISCONTINUED | OUTPATIENT
Start: 2022-10-31 | End: 2022-10-31

## 2022-10-31 RX ORDER — MORPHINE SULFATE 4 MG/ML
2 INJECTION, SOLUTION INTRAMUSCULAR; INTRAVENOUS EVERY 10 MIN PRN
Status: DISCONTINUED | OUTPATIENT
Start: 2022-10-31 | End: 2022-10-31

## 2022-10-31 RX ADMIN — CEFAZOLIN SODIUM/WATER 2 G: 2 G/20 ML SYRINGE (ML) INTRAVENOUS at 13:56:00

## 2022-10-31 RX ADMIN — EPHEDRINE SULFATE 5 MG: 50 INJECTION, SOLUTION INTRAVENOUS at 13:56:00

## 2022-10-31 RX ADMIN — GLYCOPYRROLATE 0.2 MG: 0.2 INJECTION, SOLUTION INTRAMUSCULAR; INTRAVENOUS at 14:03:00

## 2022-10-31 RX ADMIN — LIDOCAINE HYDROCHLORIDE 4 ML: 40 SOLUTION TOPICAL at 13:53:00

## 2022-10-31 RX ADMIN — SODIUM CHLORIDE: 9 INJECTION, SOLUTION INTRAVENOUS at 13:41:00

## 2022-10-31 RX ADMIN — LIDOCAINE HYDROCHLORIDE 40 MG: 10 INJECTION, SOLUTION EPIDURAL; INFILTRATION; INTRACAUDAL; PERINEURAL at 13:53:00

## 2022-10-31 RX ADMIN — MIDAZOLAM HYDROCHLORIDE 2 MG: 1 INJECTION INTRAMUSCULAR; INTRAVENOUS at 13:34:00

## 2022-10-31 RX ADMIN — DEXAMETHASONE SODIUM PHOSPHATE 4 MG: 10 INJECTION, SOLUTION INTRAMUSCULAR; INTRAVENOUS at 13:37:00

## 2022-10-31 RX ADMIN — LIDOCAINE HYDROCHLORIDE 2 ML: 10 INJECTION, SOLUTION EPIDURAL; INFILTRATION; INTRACAUDAL; PERINEURAL at 13:34:00

## 2022-10-31 RX ADMIN — ROCURONIUM BROMIDE 40 MG: 10 INJECTION, SOLUTION INTRAVENOUS at 13:53:00

## 2022-10-31 RX ADMIN — PHENYLEPHRINE HCL 100 MCG: 10 MG/ML VIAL (ML) INJECTION at 14:49:00

## 2022-10-31 RX ADMIN — ONDANSETRON 4 MG: 2 INJECTION INTRAMUSCULAR; INTRAVENOUS at 14:03:00

## 2022-10-31 RX ADMIN — PHENYLEPHRINE HCL 100 MCG: 10 MG/ML VIAL (ML) INJECTION at 14:35:00

## 2022-10-31 RX ADMIN — ROPIVACAINE HYDROCHLORIDE 30 ML: 5 INJECTION, SOLUTION EPIDURAL; INFILTRATION; PERINEURAL at 13:37:00

## 2022-10-31 RX ADMIN — GLYCOPYRROLATE 0.6 MG: 0.2 INJECTION, SOLUTION INTRAMUSCULAR; INTRAVENOUS at 15:03:00

## 2022-10-31 RX ADMIN — PHENYLEPHRINE HCL 100 MCG: 10 MG/ML VIAL (ML) INJECTION at 14:00:00

## 2022-10-31 RX ADMIN — EPHEDRINE SULFATE 5 MG: 50 INJECTION, SOLUTION INTRAVENOUS at 14:46:00

## 2022-10-31 RX ADMIN — SODIUM CHLORIDE: 9 INJECTION, SOLUTION INTRAVENOUS at 15:36:00

## 2022-10-31 RX ADMIN — PHENYLEPHRINE HCL 100 MCG: 10 MG/ML VIAL (ML) INJECTION at 14:04:00

## 2022-10-31 RX ADMIN — EPHEDRINE SULFATE 5 MG: 50 INJECTION, SOLUTION INTRAVENOUS at 14:30:00

## 2022-10-31 RX ADMIN — NEOSTIGMINE METHYLSULFATE 5 MG: 1 INJECTION, SOLUTION INTRAVENOUS at 15:03:00

## 2022-10-31 NOTE — ANESTHESIA PROCEDURE NOTES
Peripheral Block    Date/Time: 10/31/2022 1:34 PM  Performed by: Gypsy Copeland MD  Authorized by: Gypsy Copeland MD       General Information and Staff    Start Time:  10/31/2022 1:34 PM  End Time:  10/31/2022 1:37 PM  Anesthesiologist:  Gypsy Copeland MD  Performed by: Anesthesiologist  Patient Location:  PACU    Block Placement: Pre Induction  Site Identification: real time ultrasound guided, nerve stimulator and image stored and retrievable    Block site/laterality marked before start: site marked  Reason for Block: at surgeon's request and post-op pain management    Preanesthetic Checklist: 2 patient identifers, IV checked, site marked, risks and benefits discussed, monitors and equipment checked, pre-op evaluation, timeout performed, anesthesia consent, sterile technique used, no prohibitive neurological deficits and no local skin infection at insertion site      Procedure Details    Patient Position:  Sitting  Prep: ChloraPrep and patient draped    Monitoring:  Cardiac monitor, heart rate, continuous pulse ox and blood pressure cuff  Block Type: Interscalene  Laterality:  Right  Injection Technique:  Single-shot    Needle    Needle Type:  Short-bevel  Needle Gauge:  22 G  Needle Length:  50 mm  Needle Localization:  Ultrasound guidance and nerve stimulator  Reason for Ultrasound Use: appropriate spread of the medication was noted in real time and no ultrasound evidence of intravascular and/or intraneural injection    Nerve Stimulator: 0.4 amps  Muscle Twitch Response: flexor carpi radialis response      Assessment    Injection Assessment:  Good spread noted, incremental injection, local visualized surrounding nerve on ultrasound, low pressure, negative aspiration for heme, no pain on injection and negative resistance  Paresthesia Pain:  None  Heart Rate Change: No    - Patient tolerated block procedure well without evidence of immediate block related complications. Medications  10/31/2022 1:34 PM      Additional Comments

## 2022-10-31 NOTE — OPERATIVE REPORT
Operative Note    Patient Name: Murali Zazueta    Preoperative Diagnosis: Traumatic complete tear of right rotator cuff, subsequent encounter [S46.011D]    Postoperative Diagnosis: Traumatic complete tear of right rotator cuff, subsequent encounter [S46.011D], subacromial impingement    Primary Surgeon: Brody Torres MD     Assistant: Lor Alamo    Procedures: R shoulder arthroscopy, RC repair, subacromial decompression    Surgical Findings: above     Anesthesia: General/regional    Complications: none    Specimen: none    Drains: none    Condition: stable to RR    Estimated Blood Loss: Desmond Kirkland MD

## 2022-10-31 NOTE — ANESTHESIA PROCEDURE NOTES
Airway  Date/Time: 10/31/2022 1:54 PM  Urgency: Elective      General Information and Staff    Patient location during procedure: OR  Anesthesiologist: Jay Singer MD  Resident/CRNA: Leann Tejada CRNA  Performed: CRNA     Indications and Patient Condition  Indications for airway management: anesthesia  Sedation level: deep  Preoxygenated: yes  Patient position: sniffing  Mask difficulty assessment: 2 - vent by mask + OA or adjuvant +/- NMBA    Final Airway Details  Final airway type: endotracheal airway      Successful airway: ETT  Cuffed: yes   Successful intubation technique: direct laryngoscopy  Facilitating devices/methods: cricoid pressure and intubating stylet  Endotracheal tube insertion site: oral  Blade: Amrit  Blade size: #4  ETT size (mm): 8.0    Cormack-Lehane Classification: grade IIB - view of arytenoids or posterior of glottis only  Placement verified by: chest auscultation and capnometry   Measured from: teeth  ETT to teeth (cm): 23  Number of attempts at approach: 1

## 2022-11-01 NOTE — OPERATIVE REPORT
[facility]    PATIENT'S NAME: Annita Poole   ATTENDING PHYSICIAN: Bret Rodriguez MD   OPERATING PHYSICIAN: Bret Rodriguez MD   PATIENT ACCOUNT#:   [id number]    LOCATION:  [address]  MEDICAL RECORD #:   [id number]       YOB: 1970  ADMISSION DATE:       10/31/2022      OPERATION DATE:  10/31/2022    OPERATIVE REPORT      PREOPERATIVE DIAGNOSIS:  Right shoulder rotator cuff tear. POSTOPERATIVE DIAGNOSIS:  Right shoulder rotator cuff tear. PROCEDURE:    1. Right shoulder arthroscopy. 2.   Arthroscopic rotator cuff repair. 3.   Arthroscopic subacromial decompression. ASSISTANT:  MARJORIE Saini and Anushka Heller    ANESTHESIA:  General with regional nerve block. COMPLICATIONS:  None. BLOOD LOSS:  20 mL. SPECIMEN:  None. DRAINS:  None. COMPLICATIONS:  None. INDICATIONS:  The patient is a 50-year-old male with history of right shoulder pain after an injury. Preoperative physical findings, imaging studies were consistent with a tear of the rotator cuff full-thickness. After discussion with the patient the risks and benefits of proceeding with operative treatment of the right shoulder, he wished to proceed with surgery. He failed conservative treatment including anti-inflammatory medications, therapeutic exercise, activity modifications, and subacromial injections. FINDINGS:  Examination under anesthesia, the patient full passive range of motion when anesthetized anterior posterior load and shift maneuvers were negative. ARTHROSCOPIC FINDINGS:    1. Glenohumeral joint. The glenoid articular surface and the humeral head articular surface intact unremarkable with minimal grade 1 degenerative softening. 2.   Labrum. There was some degenerative fraying of the superior anterior labrum, but no discrete tear appreciated. The biceps anchor was intact with minimal peel back.   3.   Biceps tendon otherwise unremarkable with minimal inflammatory change. 4.   Subscapularis tendon intact unremarkable. 5.   Axillary recess unremarkable. There were no significant capsular avulsions or loose bodies. 6.   Rotator cuff. The articular surface of the rotator cuff had a full-thickness tear when visualized from the articular side. SUBACROMIAL SPACE:    1.   Bursa. Thickened and erythematous, consistent with subacromial bursitis. 2.   Acromion. There was a small osteophyte at the anterolateral acromion measuring approximately 8 or 9 mm in size. 3.   Rotator cuff. There was a full-thickness tear of the rotator cuff which involved a majority of the greater tuberosity. Diameter of the tear was approximately 15 mm with retraction of 10 mm. OPERATIVE TECHNIQUE:  The patient was identified in the preoperative holding area. The appropriate consents were obtained. He was taken the operating room, placed in the supine position on the operating room table. After adequate general and regional anesthesia were obtained, he was turned to the left lateral decubitus position. An axillary roll was placed under the left axilla. Bony prominences were well padded. SCD devices were placed on both lower extremities. The patient was given preoperative IV antibiotics. The right shoulder was then prepped and draped in a sterile fashion. The extremity was suspended from the axial traction device with 10 pounds of axial traction applied. A posterior portal was established. The camera was inserted into the glenohumeral joint. A thorough examination of the joint was performed. The findings were as stated. Next. An anterior portal was established just inferior to the acromioclavicular joint. A motorized shaver and radiofrequency wand were used to debride the superior labrum and undersurface of the rotator cuff of all fibrillated material.  The camera was then redirected into the subacromial space.   A lateral portal was established just lateral to the acromion. A motorized shaver and radiofrequency wand were used to perform a complete subacromial bursectomy. A motorized bur was used to perform a bony acromioplasty. Approximately 8 to 10 mm of the undersurface of the anterolateral acromion was resected to decompress the subacromial space. Portions of the CA ligament were also excised. A rasp was then used to prepare the greater tuberosity for reattachment of the rotator cuff. An accessory anterolateral and an accessory posterolateral portal were established. Two [employer] SwiveLock anchors were next placed at the articular margin of the greater tuberosity, 1 anterior, 1 posterior. Four strands of FiberTape and 4 strands of FiberWire sutures were then passed through the free edge of the supraspinatus tendon in a simple fashion. Two knots were tied, 1 anterior, 1 posterior, and this adequately secured the medial row of the repair. We then placed 2 additional lateral row anchors at the lateral tuberosity. These were [employer] SwiveLock anchors. Two strands of FiberWire and 2 strands of FiberTape were passed through each anchor in this way a modified bridging technique was performed. Excellent recreation of the anatomic footprint was noted. There was no persistent defect in the tendon and good stability. Minimal tension on the repair. Excess suture was removed. Subacromial space was suction irrigated. Instruments were removed. Portals closed with 4-0 nylon sutures. Sterile dressing was applied, followed by an UltraSling. The patient's anesthesia was reversed. He was extubated and taken to recovery room in stable condition. All sponge and instrument counts were reported as correct. The attending physician, Dr. Crystal Moritz, was present and performed all critical portions of the procedure. There were no complications. The first assistant was medically necessary for the surgery. He assisted with patient positioning.   He operated the arthroscope during portions of the procedure. He assisted with suture management, hemostasis, and wound closure. Without the aid of the assistant, the surgical procedure would not have been possible. Dictated By Sarkis Juares.  Giovana Rodriguez MD  d: 10/31/2022 15:24:32  t: 10/31/2022 23:52:42  [facility] [id number]  TMV/

## 2022-11-10 ENCOUNTER — TELEPHONE (OUTPATIENT)
Dept: FAMILY MEDICINE CLINIC | Facility: CLINIC | Age: 52
End: 2022-11-10

## 2022-11-10 DIAGNOSIS — E11.21 TYPE 2 DIABETES MELLITUS WITH DIABETIC NEPHROPATHY, WITHOUT LONG-TERM CURRENT USE OF INSULIN (HCC): Primary | ICD-10-CM

## 2022-11-10 NOTE — TELEPHONE ENCOUNTER
Patient is requesting referral.     Name of specialist and specialty department : podiatry/ Dr. Goldy Hollis  Reason for visit with the specialist: to cut his toe nails  Address of the specialist office: [address] [place] [address]   Appointment date: waiting on referral         CSS informed patient the turnaround time for referral is 5-7 business days. Patient was informed to check their [employer] account for referral status.

## 2022-11-11 ENCOUNTER — OFFICE VISIT (OUTPATIENT)
Dept: ORTHOPEDICS CLINIC | Facility: CLINIC | Age: 52
End: 2022-11-11
Payer: COMMERCIAL

## 2022-11-11 DIAGNOSIS — S46.011D TRAUMATIC COMPLETE TEAR OF RIGHT ROTATOR CUFF, SUBSEQUENT ENCOUNTER: Primary | ICD-10-CM

## 2022-11-11 PROCEDURE — 99024 POSTOP FOLLOW-UP VISIT: CPT

## 2022-11-11 NOTE — TELEPHONE ENCOUNTER
Hello    Please sign off if you agree with plan of care.      Please note diagnosis    Thank you,  [facility]  Referral specialist

## 2022-11-14 ENCOUNTER — TELEPHONE (OUTPATIENT)
Dept: PHYSICAL THERAPY | Facility: HOSPITAL | Age: 52
End: 2022-11-14

## 2022-11-15 ENCOUNTER — OFFICE VISIT (OUTPATIENT)
Dept: PHYSICAL THERAPY | Age: 52
End: 2022-11-15
Payer: COMMERCIAL

## 2022-11-15 DIAGNOSIS — S46.011D TRAUMATIC COMPLETE TEAR OF RIGHT ROTATOR CUFF, SUBSEQUENT ENCOUNTER: ICD-10-CM

## 2022-11-15 PROCEDURE — 97110 THERAPEUTIC EXERCISES: CPT | Performed by: PHYSICAL THERAPIST

## 2022-11-15 PROCEDURE — 97161 PT EVAL LOW COMPLEX 20 MIN: CPT | Performed by: PHYSICAL THERAPIST

## 2022-11-17 ENCOUNTER — APPOINTMENT (OUTPATIENT)
Dept: PHYSICAL THERAPY | Age: 52
End: 2022-11-17
Payer: COMMERCIAL

## 2022-11-17 ENCOUNTER — TELEPHONE (OUTPATIENT)
Dept: PHYSICAL THERAPY | Facility: HOSPITAL | Age: 52
End: 2022-11-17

## 2022-11-22 ENCOUNTER — OFFICE VISIT (OUTPATIENT)
Dept: PHYSICAL THERAPY | Age: 52
End: 2022-11-22
Payer: COMMERCIAL

## 2022-11-22 PROCEDURE — 97140 MANUAL THERAPY 1/> REGIONS: CPT | Performed by: PHYSICAL THERAPIST

## 2022-11-22 PROCEDURE — 97110 THERAPEUTIC EXERCISES: CPT | Performed by: PHYSICAL THERAPIST

## 2022-11-22 NOTE — PROGRESS NOTES
Dx: Traumatic complete tear of right rotator cuff, subsequent encounter (S46.011D)    1. Right shoulder arthroscopy. 2.       Arthroscopic rotator cuff repair. 3.       Arthroscopic subacromial decompression. Insurance (Authorized # of Visits):  10 vists per POC           Authorizing Physician: Dr. Noah Toledo  Next MD visit: none scheduled  Fall Risk: standard         Precautions: n/a        Date of Surgery: 10/31/2022  MEDICATION CHANGES SINCE LAST SESSION? NO  Pain ratin at rest  Subjective: Reports his shoulder is doing better and minimal to no pain with today's session. Objective: (R) elbow extension limited to -20 to full ext; requires max cueing for scapular retraction to avoid UT substitution via elevation. Assessment: Decreased guarding allowing improved PROM; needs work on facilitating thoracic extension and scapular setting. Goals: In progress as follows:  1. Patient will be independent with HEP, its progression, and management of residual symptoms. 2. Patient will report shoulder pain of not more than 1/10 during active motion of R UE.  3. Increase R shoulder ROM to [facility] into all planes to improve R UE mobility for reaching activities. 4. Increase strength throughout R UE to be able to lift R UE overhead without any major deviations, perform light household lifting and carrying activities. 5. Patient will verbalize and demonstrate strategies to improve posture when out of the sling. Plan: Continue per protocol. Date: 2022  TX#: 2/10 Date:                 TX#: 3/ Date:                 TX#: 4/ Date:                 TX#: 5/ Date:    Tx#: 6/   THERAPEUTIC EX 45 mins       Cervical retraction in sitting 3x10       Scapular retraction 3x10 1/2 foam roll squeeze       Thoracic extension 3x10 over 1/2 foam roll       Table slides 3x10 with (L) assist       Shoulder pendulum 3x10 ea A-P, lateral, CW/CCW       Shoulder PROM 15 mins       (R) elbow PROM 3 mins MANUAL TX 13 mins       Thoracic PA's 3 mins Gr 2-3       Scapulothoracic mobs Prone x 5 mins       (R) GH Gr 1-2 all planes x 5 mins                       HEP: Refer to patient instructions    Charges:  Therapeutic ex x 3; Manual Tx x 1       Total Timed Treatment: 58 min  Total Treatment Time: 58 min

## 2022-11-29 ENCOUNTER — OFFICE VISIT (OUTPATIENT)
Dept: PHYSICAL THERAPY | Age: 52
End: 2022-11-29
Payer: COMMERCIAL

## 2022-11-29 PROCEDURE — 97110 THERAPEUTIC EXERCISES: CPT | Performed by: PHYSICAL THERAPIST

## 2022-11-29 PROCEDURE — 97140 MANUAL THERAPY 1/> REGIONS: CPT | Performed by: PHYSICAL THERAPIST

## 2022-11-29 NOTE — PROGRESS NOTES
Dx: Traumatic complete tear of right rotator cuff, subsequent encounter (S46.011D)    1. Right shoulder arthroscopy. 2.       Arthroscopic rotator cuff repair. 3.       Arthroscopic subacromial decompression. Insurance (Authorized # of Visits):  10 vists per POC           Authorizing Physician: Dr. Greg Shoemaker MD visit: none scheduled  Fall Risk: standard         Precautions: n/a        Date of Surgery: 10/31/2022  MEDICATION CHANGES SINCE LAST SESSION? NO  Pain ratin at rest  Subjective: Reports having pain over the top of his shoulder today especially into end-range stretch    Objective: (R) shoulder flexion PROM to 130      Assessment: Improving PROM within protocol limits but increased pain this session resulting in increased guarding into end-range. Goals: In progress as follows:  1. Patient will be independent with HEP, its progression, and management of residual symptoms. 2. Patient will report shoulder pain of not more than 1/10 during active motion of R UE.  3. Increase R shoulder ROM to [facility] into all planes to improve R UE mobility for reaching activities. 4. Increase strength throughout R UE to be able to lift R UE overhead without any major deviations, perform light household lifting and carrying activities. 5. Patient will verbalize and demonstrate strategies to improve posture when out of the sling. Plan: Continue per protocol. Date: 2022  TX#: 2/10 Date: 2022                 TX#: 3/10 Date:                 TX#: 4/ Date:                 TX#: 5/ Date:    Tx#: 6/   THERAPEUTIC EX 45 mins 45 mins      Cervical retraction in sitting 3x10 3x10      Scapular retraction 3x10 1/2 foam roll squeeze 3x10 1/2 foam roll squeeze      Thoracic extension 3x10 over 1/2 foam roll 3x10 over 1/2 foam roll      Table slides 3x10 with (L) assist       Shoulder pendulum 3x10 ea A-P, lateral, CW/CCW       Shoulder PROM 15 mins 25 mins      (R) elbow PROM 3 mins 5 mins with extension stretch                              MANUAL TX 13 mins 13 mins      Thoracic PA's 3 mins Gr 2-3 3 mins Gr 2-3      Scapulothoracic mobs Prone x 5 mins Prone x 5 mins      (R) GH Gr 1-2 all planes x 5 mins Gr 1-2 all planes x 5 mins                      HEP: Continue current HEP. Charges:  Therapeutic ex x 3; Manual Tx x 1       Total Timed Treatment: 58 min  Total Treatment Time: 58 min

## 2022-12-01 ENCOUNTER — OFFICE VISIT (OUTPATIENT)
Dept: PHYSICAL THERAPY | Age: 52
End: 2022-12-01
Payer: COMMERCIAL

## 2022-12-01 PROCEDURE — 97140 MANUAL THERAPY 1/> REGIONS: CPT | Performed by: PHYSICAL THERAPIST

## 2022-12-01 PROCEDURE — 97110 THERAPEUTIC EXERCISES: CPT | Performed by: PHYSICAL THERAPIST

## 2022-12-01 NOTE — PROGRESS NOTES
Dx: Traumatic complete tear of right rotator cuff, subsequent encounter (S46.011D)    1. Right shoulder arthroscopy. 2.       Arthroscopic rotator cuff repair. 3.       Arthroscopic subacromial decompression. Insurance (Authorized # of Visits):  10 vists per POC           Authorizing Physician: Dr. Shay Butterfield  Next MD visit: none scheduled  Fall Risk: standard         Precautions: n/a        Date of Surgery: 10/31/2022  MEDICATION CHANGES SINCE LAST SESSION? NO  Pain ratin at rest  Subjective: Reports having pain over the top of his shoulder today especially into end-range stretch    Objective: (R) shoulder ER to 30 passively      Assessment: Tolerating progression to AAROM but significant inibition to ER along with capsular tightness limiting ROM. Goals: In progress as follows:  1. Patient will be independent with HEP, its progression, and management of residual symptoms. 2. Patient will report shoulder pain of not more than 1/10 during active motion of R UE.  3. Increase R shoulder ROM to [facility] into all planes to improve R UE mobility for reaching activities. 4. Increase strength throughout R UE to be able to lift R UE overhead without any major deviations, perform light household lifting and carrying activities. 5. Patient will verbalize and demonstrate strategies to improve posture when out of the sling. Plan: Continue per protocol currently at week 5   Date: 2022  TX#: 2/10 Date: 2022                 TX#: 3/10 Date: 2022                 TX#: 4/10 Date:                 TX#: 5/ Date:    Tx#: 6/   THERAPEUTIC EX 45 mins 45 mins 45 mins     Cervical retraction in sitting 3x10 3x10      Scapular retraction 3x10 1/2 foam roll squeeze 3x10 1/2 foam roll squeeze Prone off edge     Thoracic extension 3x10 over 1/2 foam roll 3x10 over 1/2 foam roll      Table slides 3x10 with (L) assist       Shoulder pendulum 3x10 ea A-P, lateral, CW/CCW       Shoulder PROM 15 mins 25 mins 20 mins     (R) elbow PROM 3 mins 5 mins with extension stretch 5 mins with extension stretch     (R) shoulder AAROM   Supine x 5 mins     (R) shoulder isometrics   Yoga ball submaximal 2x10 ea             MANUAL TX 13 mins 13 mins 13 mins     Thoracic PA's 3 mins Gr 2-3 3 mins Gr 2-3 3 mins Gr 2-3      Scapulothoracic mobs Prone x 5 mins Prone x 5 mins Prone x 5 mins     (R) GH Gr 1-2 all planes x 5 mins Gr 1-2 all planes x 5 mins Gr 1-2 all planes x 5 mins                     HEP: Continue current HEP. Charges:  Therapeutic ex x 3; Manual Tx x 1       Total Timed Treatment: 58 min  Total Treatment Time: 58 min

## 2022-12-06 ENCOUNTER — OFFICE VISIT (OUTPATIENT)
Dept: PHYSICAL THERAPY | Age: 52
End: 2022-12-06
Payer: COMMERCIAL

## 2022-12-06 PROCEDURE — 97140 MANUAL THERAPY 1/> REGIONS: CPT | Performed by: PHYSICAL THERAPIST

## 2022-12-06 PROCEDURE — 97110 THERAPEUTIC EXERCISES: CPT | Performed by: PHYSICAL THERAPIST

## 2022-12-06 PROCEDURE — 97112 NEUROMUSCULAR REEDUCATION: CPT | Performed by: PHYSICAL THERAPIST

## 2022-12-06 NOTE — PROGRESS NOTES
Dx: Traumatic complete tear of right rotator cuff, subsequent encounter (S46.011D)    1. Right shoulder arthroscopy. 2.       Arthroscopic rotator cuff repair. 3.       Arthroscopic subacromial decompression. Insurance (Authorized # of Visits):  10 vists per POC           Authorizing Physician: Dr. Lukas Castañeda  Next MD visit: none scheduled  Fall Risk: standard         Precautions: n/a        Date of Surgery: 10/31/2022  MEDICATION CHANGES SINCE LAST SESSION? NO  Pain rating: 3 at rest  Subjective: No new c/o; no increase in symptoms with shoulder AROM. Objective: UT substitution noted with elevation    Assessment: Tolerating progression to AAROM/AROM without signs of tissue irritability; needs work on both capsular mobility and proximal stability to allow improved scapulohumeral mechanics for active elevation. Goals: In progress as follows:  1. Patient will be independent with HEP, its progression, and management of residual symptoms. 2. Patient will report shoulder pain of not more than 1/10 during active motion of R UE.  3. Increase R shoulder ROM to [facility] into all planes to improve R UE mobility for reaching activities. 4. Increase strength throughout R UE to be able to lift R UE overhead without any major deviations, perform light household lifting and carrying activities. 5. Patient will verbalize and demonstrate strategies to improve posture when out of the sling. Plan: Continue per protocol currently at week 6   Date: 11/22/2022  TX#: 2/10 Date: 11/29/2022                 TX#: 3/10 Date: 12/1/2022                 TX#: 4/10 Date:12/6/2022                 TX#: 5/10 Date:    Tx#: 6/   THERAPEUTIC EX 45 mins 45 mins 45 mins 50 mins    Cervical retraction in sitting 3x10 3x10      Scapular retraction 3x10 1/2 foam roll squeeze 3x10 1/2 foam roll squeeze Prone off edge Prone off edge 3x10    Thoracic extension 3x10 over 1/2 foam roll 3x10 over 1/2 foam roll      Table slides 3x10 with (L) assist       Shoulder pendulum 3x10 ea A-P, lateral, CW/CCW       Shoulder PROM 15 mins 25 mins 20 mins 10 mins    (R) elbow PROM 3 mins 5 mins with extension stretch 5 mins with extension stretch     (R) shoulder AAROM   Supine x 5 mins Supine x 5 mins    (R) shoulder isometrics   Yoga ball submaximal 2x10 ea     OH pulley    Flexion/scaption x 4 mins ea    SB table roll    Flexion/scaption x 30 ea    Prone shoulder flexion/ext    3x10    Prone LT, MT, rhomboid    3x10 ea    Serratus punch    (R) 3x10    Dowel flexion, Abd/ER    Supine 3x10 ea    Dowel shoulder ext    (B) 3x10 standing                            NEUROMUSCULAR RE-EDUCATION    8 mins    Rhythmic stabilization    Supine 90/120 flexion with min perturbations    Static hold    Supine 90/120 flexion 2 x 30 secs ea    SL (R) scapular prot/ret    Fist on yoga ball 3x10            MANUAL TX 13 mins 13 mins 13 mins 10 mins    Thoracic PA's 3 mins Gr 2-3 3 mins Gr 2-3 3 mins Gr 2-3      Scapulothoracic mobs Prone x 5 mins Prone x 5 mins Prone x 5 mins Prone x 5 mins    (R) GH Gr 1-2 all planes x 5 mins Gr 1-2 all planes x 5 mins Gr 1-2 all planes x 5 mins Gr 2-3 all planes x 5 mins                    HEP: Continue current HEP. Charges:  Therapeutic ex x 3; Neuromuscular re-education x 1; Manual Tx x 1       Total Timed Treatment: 68 min  Total Treatment Time: 68 min

## 2022-12-08 ENCOUNTER — OFFICE VISIT (OUTPATIENT)
Dept: PHYSICAL THERAPY | Age: 52
End: 2022-12-08
Payer: COMMERCIAL

## 2022-12-08 PROCEDURE — 97110 THERAPEUTIC EXERCISES: CPT | Performed by: PHYSICAL THERAPIST

## 2022-12-08 PROCEDURE — 97140 MANUAL THERAPY 1/> REGIONS: CPT | Performed by: PHYSICAL THERAPIST

## 2022-12-08 PROCEDURE — 97112 NEUROMUSCULAR REEDUCATION: CPT | Performed by: PHYSICAL THERAPIST

## 2022-12-08 NOTE — PROGRESS NOTES
Dx: Traumatic complete tear of right rotator cuff, subsequent encounter (S46.011D)    1. Right shoulder arthroscopy. 2.       Arthroscopic rotator cuff repair. 3.       Arthroscopic subacromial decompression. Insurance (Authorized # of Visits):  10 vists per POC           Authorizing Physician: Dr. Renu Olson  Next MD visit: none scheduled  Fall Risk: standard         Precautions: n/a        Date of Surgery: 10/31/2022  MEDICATION CHANGES SINCE LAST SESSION? NO  Pain rating: 3 at rest  Subjective: c/o inability to reach horizontally and OH against gravity    Objective: (R) shoulder ER grossly 2-/5    Assessment: Poor RC strength limiting proximal stability and ability to reach horizontall and OH      Goals: In progress as follows:  1. Patient will be independent with HEP, its progression, and management of residual symptoms. 2. Patient will report shoulder pain of not more than 1/10 during active motion of R UE.  3. Increase R shoulder ROM to [facility] into all planes to improve R UE mobility for reaching activities. 4. Increase strength throughout R UE to be able to lift R UE overhead without any major deviations, perform light household lifting and carrying activities. 5. Patient will verbalize and demonstrate strategies to improve posture when out of the sling.     Plan: Continue per protocol currently at week 6   Date: 11/22/2022  TX#: 2/10 Date: 11/29/2022                 TX#: 3/10 Date: 12/1/2022                 TX#: 4/10 Date:12/6/2022                 TX#: 5/10 Date: 12/8/2022  Tx#: 6/10   THERAPEUTIC EX 45 mins 45 mins 45 mins 50 mins 40 mins   Cervical retraction in sitting 3x10 3x10      Scapular retraction 3x10 1/2 foam roll squeeze 3x10 1/2 foam roll squeeze Prone off edge Prone off edge 3x10 Prone off edge 3x10 manually resisted   Thoracic extension 3x10 over 1/2 foam roll 3x10 over 1/2 foam roll      Table slides 3x10 with (L) assist       Shoulder pendulum 3x10 ea A-P, lateral, CW/CCW Shoulder PROM 15 mins 25 mins 20 mins 10 mins 10 mins   (R) elbow PROM 3 mins 5 mins with extension stretch 5 mins with extension stretch     (R) shoulder AAROM   Supine x 5 mins Supine x 5 mins Supine D1/D2 3x10 ea   (R) shoulder isometrics   Yoga ball submaximal 2x10 ea  Yoga ball submaximal 3x10 ea   OH pulley    Flexion/scaption x 4 mins ea Flexion/scaption x 4 mins ea   SB table roll    Flexion/scaption x 30 ea    Prone shoulder flexion/ext    3x10 3x10   Prone LT, MT, rhomboid    3x10 ea 3x10 ea   Serratus punch    (R) 3x10 (R) 3x10   Dowel flexion, Abd/ER    Supine 3x10 ea    Dowel shoulder ext    (B) 3x10 standing    Cane assisted wall wash     (R) 3x10                   NEUROMUSCULAR RE-EDUCATION    8 mins 8 mins   Rhythmic stabilization    Supine 90/120 flexion with min perturbations Supine 90/120 flexion with min perturbations   Static hold    Supine 90/120 flexion 2 x 30 secs ea Supine 90/120 flexion 2 x 30 secs ea   SL (R) scapular prot/ret    Fist on yoga ball 3x10 Fist on yoga ball 3x10           MANUAL TX 13 mins 13 mins 13 mins 10 mins 8 mins   Thoracic PA's 3 mins Gr 2-3 3 mins Gr 2-3 3 mins Gr 2-3      Scapulothoracic mobs Prone x 5 mins Prone x 5 mins Prone x 5 mins Prone x 5 mins Prone x 3 mins   (R) GH Gr 1-2 all planes x 5 mins Gr 1-2 all planes x 5 mins Gr 1-2 all planes x 5 mins Gr 2-3 all planes x 5 mins Gr 2-3 all planes x 5 mins                   HEP: Continue current HEP. Charges:  Therapeutic ex x 3; Neuromuscular re-education x 1; Manual Tx x 1       Total Timed Treatment: 68 min  Total Treatment Time: 68 min

## 2022-12-13 ENCOUNTER — OFFICE VISIT (OUTPATIENT)
Dept: PHYSICAL THERAPY | Age: 52
End: 2022-12-13
Payer: COMMERCIAL

## 2022-12-13 PROCEDURE — 97140 MANUAL THERAPY 1/> REGIONS: CPT | Performed by: PHYSICAL THERAPIST

## 2022-12-13 PROCEDURE — 97110 THERAPEUTIC EXERCISES: CPT | Performed by: PHYSICAL THERAPIST

## 2022-12-13 PROCEDURE — 97112 NEUROMUSCULAR REEDUCATION: CPT | Performed by: PHYSICAL THERAPIST

## 2022-12-13 NOTE — PROGRESS NOTES
Dx: Traumatic complete tear of right rotator cuff, subsequent encounter (S46.011D)    1. Right shoulder arthroscopy. 2.       Arthroscopic rotator cuff repair. 3.       Arthroscopic subacromial decompression. Insurance (Authorized # of Visits):  10 vists per POC           Authorizing Physician: Dr. June Shoemaker MD visit: none scheduled  Fall Risk: standard         Precautions: n/a        Date of Surgery: 10/31/2022  MEDICATION CHANGES SINCE LAST SESSION? NO  Pain rating: 3 at rest  Subjective: No new c/o. Objective: (R) shoulder flexion AROM to 70 with significant UT substitution    Assessment: Decreased proximal strength/stability resulting in significant UT substitution       Goals: In progress as follows:  1. Patient will be independent with HEP, its progression, and management of residual symptoms. 2. Patient will report shoulder pain of not more than 1/10 during active motion of R UE.  3. Increase R shoulder ROM to [facility] into all planes to improve R UE mobility for reaching activities. 4. Increase strength throughout R UE to be able to lift R UE overhead without any major deviations, perform light household lifting and carrying activities. 5. Patient will verbalize and demonstrate strategies to improve posture when out of the sling.     Plan: Continue per protocol currently at week 7   Date: 11/22/2022  TX#: 2/10 Date: 11/29/2022                 TX#: 3/10 Date: 12/1/2022                 TX#: 4/10 Date:12/6/2022                 TX#: 5/10 Date: 12/8/2022  Tx#: 6/10 Date: 12/13/2022  Tx#: 7/10     THERAPEUTIC EX 45 mins 45 mins 45 mins 50 mins 40 mins 40 mins     Cervical retraction in sitting 3x10 3x10         Scapular retraction 3x10 1/2 foam roll squeeze 3x10 1/2 foam roll squeeze Prone off edge Prone off edge 3x10 Prone off edge 3x10 manually resisted Prone off edge 3x10 manually resisted     Thoracic extension 3x10 over 1/2 foam roll 3x10 over 1/2 foam roll         Table slides 3x10 with (L) assist          Shoulder pendulum 3x10 ea A-P, lateral, CW/CCW          Shoulder PROM 15 mins 25 mins 20 mins 10 mins 10 mins 10 mins     (R) elbow PROM 3 mins 5 mins with extension stretch 5 mins with extension stretch        (R) shoulder AAROM   Supine x 5 mins Supine x 5 mins Supine D1/D2 3x10 ea Supine D1/D2 3x10 ea     (R) shoulder isometrics   Yoga ball submaximal 2x10 ea  Yoga ball submaximal 3x10 ea Yoga submaximal 3x10 ea     OH pulley    Flexion/scaption x 4 mins ea Flexion/scaption x 4 mins ea Flexion/scaption/ext-IR x 4 mins ea     SB table roll    Flexion/scaption x 30 ea       Prone shoulder flexion/ext    3x10 3x10 3x10     Prone LT, MT, rhomboid    3x10 ea 3x10 ea 3x10 ea     Serratus punch    (R) 3x10 (R) 3x10 (R) 3x10     Dowel flexion, Abd/ER    Supine 3x10 ea       Dowel shoulder ext    (B) 3x10 standing  (B) 3x10 standing     Cane assisted wall wash     (R) 3x10 (R) 3x10                           NEUROMUSCULAR RE-EDUCATION    8 mins 8 mins 8 mins     Rhythmic stabilization    Supine 90/120 flexion with min perturbations Supine 90/120 flexion with min perturbations Supine 90/120 flexion with min perturbations     Static hold    Supine 90/120 flexion 2 x 30 secs ea Supine 90/120 flexion 2 x 30 secs ea Supine 90/120 flexion 2 x 30 secs ea     SL (R) scapular prot/ret    Fist on yoga ball 3x10 Fist on yoga ball 3x10 Fist on yoga ball 3x10                MANUAL TX 13 mins 13 mins 13 mins 10 mins 8 mins 8 mins     Thoracic PA's 3 mins Gr 2-3 3 mins Gr 2-3 3 mins Gr 2-3         Scapulothoracic mobs Prone x 5 mins Prone x 5 mins Prone x 5 mins Prone x 5 mins Prone x 3 mins Prone x 3 mins     (R) GH Gr 1-2 all planes x 5 mins Gr 1-2 all planes x 5 mins Gr 1-2 all planes x 5 mins Gr 2-3 all planes x 5 mins Gr 2-3 all planes x 5 mins Gr 2-3 all planes x 5 mins                           HEP: Continue current HEP. Charges:  Therapeutic ex x 3; Neuromuscular re-education x 1; Manual Tx x 1       Total Timed Treatment: 68 min  Total Treatment Time: 68 min

## 2022-12-15 ENCOUNTER — OFFICE VISIT (OUTPATIENT)
Dept: PHYSICAL THERAPY | Age: 52
End: 2022-12-15
Payer: COMMERCIAL

## 2022-12-15 PROCEDURE — 97112 NEUROMUSCULAR REEDUCATION: CPT | Performed by: PHYSICAL THERAPIST

## 2022-12-15 PROCEDURE — 97140 MANUAL THERAPY 1/> REGIONS: CPT | Performed by: PHYSICAL THERAPIST

## 2022-12-15 PROCEDURE — 97110 THERAPEUTIC EXERCISES: CPT | Performed by: PHYSICAL THERAPIST

## 2022-12-15 NOTE — PROGRESS NOTES
Dx: Traumatic complete tear of right rotator cuff, subsequent encounter (S46.011D)    1. Right shoulder arthroscopy. 2.       Arthroscopic rotator cuff repair. 3.       Arthroscopic subacromial decompression. Insurance (Authorized # of Visits):  10 vists per POC           Authorizing Physician: Dr. Marlene Shoemaker MD visit: none scheduled  Fall Risk: standard         Precautions: n/a        Date of Surgery: 10/31/2022  MEDICATION CHANGES SINCE LAST SESSION? NO  Pain rating: 3 at rest  Subjective: No increase in symptoms reported with exercise progressions    Objective: (R) shoulder ER grossly 2-/5    Assessment: Decreased proximal strength and stability along with poor RC function limiting horizontal and OH reaching abilities. Goals: In progress as follows:  1. Patient will be independent with HEP, its progression, and management of residual symptoms. 2. Patient will report shoulder pain of not more than 1/10 during active motion of R UE.  3. Increase R shoulder ROM to [facility] into all planes to improve R UE mobility for reaching activities. 4. Increase strength throughout R UE to be able to lift R UE overhead without any major deviations, perform light household lifting and carrying activities. 5. Patient will verbalize and demonstrate strategies to improve posture when out of the sling.     Plan: Continue per protocol currently at week 7   Date: 11/22/2022  TX#: 2/10 Date: 11/29/2022                 TX#: 3/10 Date: 12/1/2022                 TX#: 4/10 Date:12/6/2022                 TX#: 5/10 Date: 12/8/2022  Tx#: 6/10 Date: 12/13/2022  Tx#: 7/10 Date: 12/13/2022  Tx#: 8/10    THERAPEUTIC EX 45 mins 45 mins 45 mins 50 mins 40 mins 40 mins 40 mins    Cervical retraction in sitting 3x10 3x10         Scapular retraction 3x10 1/2 foam roll squeeze 3x10 1/2 foam roll squeeze Prone off edge Prone off edge 3x10 Prone off edge 3x10 manually resisted Prone off edge 3x10 manually resisted Prone off edge 3x10 manually resisted    Thoracic extension 3x10 over 1/2 foam roll 3x10 over 1/2 foam roll         Table slides 3x10 with (L) assist          Shoulder pendulum 3x10 ea A-P, lateral, CW/CCW          Shoulder PROM 15 mins 25 mins 20 mins 10 mins 10 mins 10 mins 10 mins    (R) elbow PROM 3 mins 5 mins with extension stretch 5 mins with extension stretch        (R) shoulder AAROM   Supine x 5 mins Supine x 5 mins Supine D1/D2 3x10 ea Supine D1/D2 3x10 ea Supine D1/D2 3x10 ea    (R) shoulder isometrics   Yoga ball submaximal 2x10 ea  Yoga ball submaximal 3x10 ea Yoga submaximal 3x10 ea Yoga submaximal 3x10 ea    OH pulley    Flexion/scaption x 4 mins ea Flexion/scaption x 4 mins ea Flexion/scaption/ext-IR x 4 mins ea Flexion/scaption/ext-IR x 4 mins ea    SB table roll    Flexion/scaption x 30 ea       Prone shoulder flexion/ext    3x10 3x10 3x10 3x10    Prone LT, MT, rhomboid    3x10 ea 3x10 ea 3x10 ea 3x10 ea    Serratus punch    (R) 3x10 (R) 3x10 (R) 3x10 (R) 3x10    Dowel flexion, Abd/ER    Supine 3x10 ea       Dowel shoulder ext    (B) 3x10 standing  (B) 3x10 standing (B) 3x10    Cane assisted wall wash     (R) 3x10 (R) 3x10 (R) 3 x 15     SB walk up       GSB 3x10    NuStep UE only        L3 x 10 mins                                     NEUROMUSCULAR RE-EDUCATION    8 mins 8 mins 8 mins 8 mins    Rhythmic stabilization    Supine 90/120 flexion with min perturbations Supine 90/120 flexion with min perturbations Supine 90/120 flexion with min perturbations Supine 90/120 flexion with min perturbations x 1 min ea    Static hold    Supine 90/120 flexion 2 x 30 secs ea Supine 90/120 flexion 2 x 30 secs ea Supine 90/120 flexion 2 x 30 secs ea Supine 90/120 flexion 2 x 30 secs ea    SL (R) scapular prot/ret    Fist on yoga ball 3x10 Fist on yoga ball 3x10 Fist on yoga ball 3x10 Fist on yoga ball 3x10               MANUAL TX 13 mins 13 mins 13 mins 10 mins 8 mins 8 mins 8 mins    Thoracic PA's 3 mins Gr 2-3 3 mins Gr 2-3 3 mins Gr 2-3         Scapulothoracic mobs Prone x 5 mins Prone x 5 mins Prone x 5 mins Prone x 5 mins Prone x 3 mins Prone x 3 mins Prone x 3 mins    (R) GH Gr 1-2 all planes x 5 mins Gr 1-2 all planes x 5 mins Gr 1-2 all planes x 5 mins Gr 2-3 all planes x 5 mins Gr 2-3 all planes x 5 mins Gr 2-3 all planes x 5 mins Gr 2-3 all planes x 5 mins                          HEP: Continue current HEP. Charges:  Therapeutic ex x 3; Neuromuscular re-education x 1; Manual Tx x 1       Total Timed Treatment: 68 min  Total Treatment Time: 68 min

## 2022-12-20 ENCOUNTER — OFFICE VISIT (OUTPATIENT)
Dept: PHYSICAL THERAPY | Age: 52
End: 2022-12-20
Payer: COMMERCIAL

## 2022-12-20 PROCEDURE — 97112 NEUROMUSCULAR REEDUCATION: CPT | Performed by: PHYSICAL THERAPIST

## 2022-12-20 PROCEDURE — 97140 MANUAL THERAPY 1/> REGIONS: CPT | Performed by: PHYSICAL THERAPIST

## 2022-12-20 PROCEDURE — 97110 THERAPEUTIC EXERCISES: CPT | Performed by: PHYSICAL THERAPIST

## 2022-12-20 NOTE — PROGRESS NOTES
Dx: Traumatic complete tear of right rotator cuff, subsequent encounter (S46.011D)    1. Right shoulder arthroscopy. 2.       Arthroscopic rotator cuff repair. 3.       Arthroscopic subacromial decompression. Insurance (Authorized # of Visits):  10 vists per POC           Authorizing Physician: Dr. Hayden Boles  Next MD visit: none scheduled  Fall Risk: standard         Precautions: n/a        Date of Surgery: 10/31/2022  MEDICATION CHANGES SINCE LAST SESSION? NO  Pain rating: 3 at rest  Subjective: Reports only getting intermittent pain at 3/10 at worst.    Objective: able to correct UT substitution with cueing through limited range. Assessment: Tolerating progression of strengthening without symptom reproduction but needs work on correcting substitutions. Goals: In progress as follows:  1. Patient will be independent with HEP, its progression, and management of residual symptoms. 2. Patient will report shoulder pain of not more than 1/10 during active motion of R UE.  3. Increase R shoulder ROM to [facility] into all planes to improve R UE mobility for reaching activities. 4. Increase strength throughout R UE to be able to lift R UE overhead without any major deviations, perform light household lifting and carrying activities. 5. Patient will verbalize and demonstrate strategies to improve posture when out of the sling.     Plan: Continue per protocol currently at week 8   Date: 12/1/2022                 TX#: 4/10 Date:12/6/2022                 TX#: 5/10 Date: 12/8/2022  Tx#: 6/10 Date: 12/13/2022  Tx#: 7/10 Date: 12/15/2022  Tx#: 8/10 Date: 12/20/2022  Tx#: 9/10   THERAPEUTIC EX 45 mins 50 mins 40 mins 40 mins 40 mins 40 mins   Cervical retraction in sitting         Scapular retraction Prone off edge Prone off edge 3x10 Prone off edge 3x10 manually resisted Prone off edge 3x10 manually resisted Prone off edge 3x10 manually resisted Prone off edge 3x10 manually resisted   SL shoulder abd      1lb 3x10   SL shoulder ER      1lb 3x10   Shoulder pendulum         Shoulder PROM 20 mins 10 mins 10 mins 10 mins 10 mins 10 mins   (R) elbow PROM 5 mins with extension stretch        (R) shoulder AAROM Supine x 5 mins Supine x 5 mins Supine D1/D2 3x10 ea Supine D1/D2 3x10 ea Supine D1/D2 3x10 ea Supine D1/D2 3x10 ea 1lb   (R) shoulder isometrics Yoga ball submaximal 2x10 ea  Yoga ball submaximal 3x10 ea Yoga submaximal 3x10 ea Yoga submaximal 3x10 ea    OH pulley  Flexion/scaption x 4 mins ea Flexion/scaption x 4 mins ea Flexion/scaption/ext-IR x 4 mins ea Flexion/scaption/ext-IR x 4 mins ea    SB table roll  Flexion/scaption x 30 ea       Prone shoulder flexion/ext  3x10 3x10 3x10 3x10 3x10 1lb   Prone LT, MT, rhomboid  3x10 ea 3x10 ea 3x10 ea 3x10 ea 3x10 1lb   Serratus punch  (R) 3x10 (R) 3x10 (R) 3x10 (R) 3x10 (R) 3x10 1lb   Dowel flexion, Abd/ER  Supine 3x10 ea       Dowel shoulder ext  (B) 3x10 standing  (B) 3x10 standing (B) 3x10    Cane assisted wall wash   (R) 3x10 (R) 3x10 (R) 3 x 15     SB walk up     GSB 3x10 GSB 3x10   NuStep UE only      L3 x 10 mins L4 x 10 mins   Wall wash 12, 2, 4 oclock      2x10                     NEUROMUSCULAR RE-EDUCATION  8 mins 8 mins 8 mins 8 mins 8 mins   Rhythmic stabilization  Supine 90/120 flexion with min perturbations Supine 90/120 flexion with min perturbations Supine 90/120 flexion with min perturbations Supine 90/120 flexion with min perturbations x 1 min ea Supine 90//120 with min/mod perturbations 2 x 1 min ea   Static hold  Supine 90/120 flexion 2 x 30 secs ea Supine 90/120 flexion 2 x 30 secs ea Supine 90/120 flexion 2 x 30 secs ea Supine 90/120 flexion 2 x 30 secs ea    SL (R) scapular prot/ret  Fist on yoga ball 3x10 Fist on yoga ball 3x10 Fist on yoga ball 3x10 Fist on yoga ball 3x10 Fist on yoga ball 3x10   Shoulder alphabet      45 flexion x 2 sets            MANUAL TX 13 mins 10 mins 8 mins 8 mins 8 mins 8 mins   Thoracic PA's 3 mins Gr 2-3 Scapulothoracic mobs Prone x 5 mins Prone x 5 mins Prone x 3 mins Prone x 3 mins Prone x 3 mins Prone x 3 mins   (R) GH Gr 1-2 all planes x 5 mins Gr 2-3 all planes x 5 mins Gr 2-3 all planes x 5 mins Gr 2-3 all planes x 5 mins Gr 2-3 all planes x 5 mins Gr 2-3 all planes x 5 mins                     HEP: Continue current HEP. Charges:  Therapeutic ex x 3; Neuromuscular re-education x 1; Manual Tx x 1       Total Timed Treatment: 68 min  Total Treatment Time: 68 min

## 2022-12-22 ENCOUNTER — APPOINTMENT (OUTPATIENT)
Dept: PHYSICAL THERAPY | Age: 52
End: 2022-12-22
Payer: COMMERCIAL

## 2022-12-27 ENCOUNTER — OFFICE VISIT (OUTPATIENT)
Dept: PHYSICAL THERAPY | Age: 52
End: 2022-12-27
Payer: COMMERCIAL

## 2022-12-27 PROCEDURE — 97110 THERAPEUTIC EXERCISES: CPT | Performed by: PHYSICAL THERAPIST

## 2022-12-27 PROCEDURE — 97112 NEUROMUSCULAR REEDUCATION: CPT | Performed by: PHYSICAL THERAPIST

## 2022-12-27 PROCEDURE — 97140 MANUAL THERAPY 1/> REGIONS: CPT | Performed by: PHYSICAL THERAPIST

## 2022-12-27 NOTE — PROGRESS NOTES
ProgressSummary  Pt has attended 10 visits in Physical Therapy. Dx: Traumatic complete tear of right rotator cuff, subsequent encounter (S46.954D)    1. Right shoulder arthroscopy. 2.       Arthroscopic rotator cuff repair. 3.       Arthroscopic subacromial decompression. Insurance (Authorized # of Visits):  10 vists per POC           Authorizing Physician: Dr. Jeana Lombardi  Next MD visit: none scheduled  Fall Risk: standard         Precautions: n/a        Date of Surgery: 10/31/2022  MEDICATION CHANGES SINCE LAST SESSION? NO  Pain rating: 3 at rest  Assessment:   Ralph Parmar has been responding well to his program with improvement in ability to reach horizontally but continues to have limitations with OH reaching and lifting due to poor proximal stability and RC weakness. He will benefit from continuing with his program per post-op protocol to continue working on proximal strength/stability and RC strength to allow return of OH functional reaching and lifting. Subjective: Reports difficulty with reaching above his head. Objective: significant UT substitution noted with horizontal and OH reaching; shoulder AROM limited to 80 degrees against gravity towards flexion and 60 degrees into scaption; (R) shoulder ER strength grossly 2-/5      Goals: In progress as follows:  1. Patient will be independent with HEP, its progression, and management of residual symptoms. 2. Patient will report shoulder pain of not more than 1/10 during active motion of R UE.  3. Increase R shoulder ROM to [facility] into all planes to improve R UE mobility for reaching activities. 4. Increase strength throughout R UE to be able to lift R UE overhead without any major deviations, perform light household lifting and carrying activities. 5. Patient will verbalize and demonstrate strategies to improve posture when out of the sling.     Treatment:   Date: 12/1/2022                 TX#: 4/10 Date:12/6/2022                 TX#: 5/10 Date: 12/8/2022  Tx#: 6/10 Date: 12/13/2022  Tx#: 7/10 Date: 12/15/2022  Tx#: 8/10 Date: 12/20/2022  Tx#: 9/10 Date: 12/27/2022  Tx#: 10/10   THERAPEUTIC EX 45 mins 50 mins 40 mins 40 mins 40 mins 40 mins 50 mins   Cervical retraction in sitting          Scapular retraction Prone off edge Prone off edge 3x10 Prone off edge 3x10 manually resisted Prone off edge 3x10 manually resisted Prone off edge 3x10 manually resisted Prone off edge 3x10 manually resisted Prone off edge with manual resistance 3x10   SL shoulder abd      1lb 3x10 1lb 3x10   SL shoulder ER      1lb 3x10 1lb 3x10   Shoulder pendulum          Shoulder PROM 20 mins 10 mins 10 mins 10 mins 10 mins 10 mins 10 mins   (R) elbow PROM 5 mins with extension stretch         (R) shoulder AAROM Supine x 5 mins Supine x 5 mins Supine D1/D2 3x10 ea Supine D1/D2 3x10 ea Supine D1/D2 3x10 ea Supine D1/D2 3x10 ea 1lb Supine D1/D2 3x10 ea with 1lb   (R) shoulder isometrics Yoga ball submaximal 2x10 ea  Yoga ball submaximal 3x10 ea Yoga submaximal 3x10 ea Yoga submaximal 3x10 ea     OH pulley  Flexion/scaption x 4 mins ea Flexion/scaption x 4 mins ea Flexion/scaption/ext-IR x 4 mins ea Flexion/scaption/ext-IR x 4 mins ea     SB table roll  Flexion/scaption x 30 ea        Prone shoulder flexion/ext  3x10 3x10 3x10 3x10 3x10 1lb 3x10 1lb   Prone LT, MT, rhomboid  3x10 ea 3x10 ea 3x10 ea 3x10 ea 3x10 1lb 3x10 1lb   Serratus punch  (R) 3x10 (R) 3x10 (R) 3x10 (R) 3x10 (R) 3x10 1lb (R) 3x10 2lbs   Dowel flexion, Abd/ER  Supine 3x10 ea        Dowel shoulder ext  (B) 3x10 standing  (B) 3x10 standing (B) 3x10     Cane assisted wall wash   (R) 3x10 (R) 3x10 (R) 3 x 15      SB walk up     GSB 3x10 GSB 3x10 GSB 3x10   NuStep UE only      L3 x 10 mins L4 x 10 mins L5 x 10 mins   Wall wash 12, 2, 4 oclock      2x10 3x10   Shoulder W       Yellow tband 2x10   High row       Red tband 2x10   Low row       Red tband 2x10   Lat pull down       Red tband 2x10 NEUROMUSCULAR RE-EDUCATION  8 mins 8 mins 8 mins 8 mins 8 mins 10 mins   Rhythmic stabilization  Supine 90/120 flexion with min perturbations Supine 90/120 flexion with min perturbations Supine 90/120 flexion with min perturbations Supine 90/120 flexion with min perturbations x 1 min ea Supine 90//120 with min/mod perturbations 2 x 1 min ea Supine 90//120 with min/mod perturbations 2 x 1 min ea   Static hold  Supine 90/120 flexion 2 x 30 secs ea Supine 90/120 flexion 2 x 30 secs ea Supine 90/120 flexion 2 x 30 secs ea Supine 90/120 flexion 2 x 30 secs ea     SL (R) scapular prot/ret  Fist on yoga ball 3x10 Fist on yoga ball 3x10 Fist on yoga ball 3x10 Fist on yoga ball 3x10 Fist on yoga ball 3x10 Fist on yoga ball 3x10         Shoulder alphabet      45 flexion x 2 sets Fist on yoga ball 3x10   45 flexion x 2 sets                MANUAL TX 13 mins 10 mins 8 mins 8 mins 8 mins 8 mins 8 mins   Thoracic PA's 3 mins Gr 2-3          Scapulothoracic mobs Prone x 5 mins Prone x 5 mins Prone x 3 mins Prone x 3 mins Prone x 3 mins Prone x 3 mins Prone x 3 mins   (R) GH Gr 1-2 all planes x 5 mins Gr 2-3 all planes x 5 mins Gr 2-3 all planes x 5 mins Gr 2-3 all planes x 5 mins Gr 2-3 all planes x 5 mins Gr 2-3 all planes x 5 mins Gr 2-3 all planes x 5 mins                       HEP: Continue current HEP. Charges: Therapeutic ex x 3; Neuromuscular re-education x 1; Manual Tx x 1       Total Timed Treatment: 68 min  Total Treatment Time: 68 min  FOTO: pending patient completion    Plan: Continue skilled Physical Therapy 2 x/week or a total of 20 visits over a 90 day period. Treatment will include: therapeutic exercises, manual therapy, neuromuscular re-education, therapeutic activities and home exercise program instruction. Curly Houston was advised of these findings, precautions, and treatment options and has agreed to actively participate in planning and for this course of care.     Thank you for your referral. If you have any questions, please contact me at Dept: [phone]. Sincerely,  Electronically signed by therapist: Candice Meehan PT     Physician's certification required:  Yes  Please co-sign or sign and return this letter via fax as soon as possible to [phone]. I certify the need for these services furnished under this plan of treatment and while under my care.     X___________________________________________________ Date____________________    Certification From: 49/[id number]/6514  To:3/27/2023

## 2022-12-29 ENCOUNTER — APPOINTMENT (OUTPATIENT)
Dept: PHYSICAL THERAPY | Age: 52
End: 2022-12-29
Payer: COMMERCIAL

## 2022-12-29 ENCOUNTER — MED REC SCAN ONLY (OUTPATIENT)
Dept: FAMILY MEDICINE CLINIC | Facility: CLINIC | Age: 52
End: 2022-12-29

## 2023-01-03 ENCOUNTER — APPOINTMENT (OUTPATIENT)
Dept: PHYSICAL THERAPY | Age: 53
End: 2023-01-03
Payer: MEDICARE

## 2023-01-04 ENCOUNTER — TELEPHONE (OUTPATIENT)
Dept: FAMILY MEDICINE CLINIC | Facility: CLINIC | Age: 53
End: 2023-01-04

## 2023-01-04 NOTE — TELEPHONE ENCOUNTER
Per Juancho Sidhu with  Disability form is not completed completely Form needs to specify 1, 1A, 2 or 2A disability. She will refax form to office.

## 2023-01-05 ENCOUNTER — APPOINTMENT (OUTPATIENT)
Dept: PHYSICAL THERAPY | Age: 53
End: 2023-01-05
Payer: MEDICARE

## 2023-01-09 ENCOUNTER — TELEPHONE (OUTPATIENT)
Dept: PHYSICAL THERAPY | Age: 53
End: 2023-01-09

## 2023-01-09 ENCOUNTER — APPOINTMENT (OUTPATIENT)
Dept: PHYSICAL THERAPY | Age: 53
End: 2023-01-09
Payer: MEDICARE

## 2023-01-09 NOTE — TELEPHONE ENCOUNTER
Phone room disregard patient stopped by clinic and dropped off forms. Dr Rey Simmonds please refer to form in your folder. Per Sana Dyer with  Disability form is not completed completely Form needs to specify 1, 1A, 2 or 2A disability.

## 2023-01-09 NOTE — TELEPHONE ENCOUNTER
Form has not been received thus far, phone call made to Kaleb Campos Sec of state no answer unable to leave message. Please inform pt if he can obtain a new form to complete. Form we have on file does not show what Gonzalo Springer is stating.

## 2023-01-11 ENCOUNTER — TELEPHONE (OUTPATIENT)
Dept: PHYSICAL THERAPY | Age: 53
End: 2023-01-11

## 2023-01-11 ENCOUNTER — APPOINTMENT (OUTPATIENT)
Dept: PHYSICAL THERAPY | Age: 53
End: 2023-01-11
Payer: MEDICARE

## 2023-01-17 ENCOUNTER — APPOINTMENT (OUTPATIENT)
Dept: PHYSICAL THERAPY | Age: 53
End: 2023-01-17
Payer: MEDICARE

## 2023-01-17 DIAGNOSIS — I10 ESSENTIAL HYPERTENSION: ICD-10-CM

## 2023-01-17 DIAGNOSIS — E11.21 TYPE 2 DIABETES MELLITUS WITH DIABETIC NEPHROPATHY, WITHOUT LONG-TERM CURRENT USE OF INSULIN (HCC): ICD-10-CM

## 2023-01-17 DIAGNOSIS — E78.2 MIXED HYPERLIPIDEMIA: ICD-10-CM

## 2023-01-17 NOTE — TELEPHONE ENCOUNTER
Patient requesting refill for following, as will be leaving to [place]:     furosemide 20  Repaglinide 1  rosuvastatin 20  TRESIBA   Victoza

## 2023-01-18 ENCOUNTER — APPOINTMENT (OUTPATIENT)
Dept: PHYSICAL THERAPY | Age: 53
End: 2023-01-18
Payer: MEDICARE

## 2023-01-18 RX ORDER — FUROSEMIDE 20 MG/1
20 TABLET ORAL 2 TIMES DAILY
Qty: 180 TABLET | Refills: 0 | Status: SHIPPED | OUTPATIENT
Start: 2023-01-18

## 2023-01-18 RX ORDER — REPAGLINIDE 1 MG/1
1 TABLET ORAL
Qty: 270 TABLET | Refills: 0 | Status: SHIPPED | OUTPATIENT
Start: 2023-01-18

## 2023-01-18 RX ORDER — ROSUVASTATIN CALCIUM 20 MG/1
20 TABLET, COATED ORAL NIGHTLY
Qty: 90 TABLET | Refills: 1 | Status: SHIPPED | OUTPATIENT
Start: 2023-01-18

## 2023-01-18 RX ORDER — LINAGLIPTIN 5 MG/1
5 TABLET, FILM COATED ORAL DAILY
Qty: 90 TABLET | Refills: 2 | Status: SHIPPED | OUTPATIENT
Start: 2023-01-18

## 2023-01-18 RX ORDER — INSULIN DEGLUDEC 200 U/ML
28 INJECTION, SOLUTION SUBCUTANEOUS NIGHTLY
Qty: 18 ML | Refills: 0 | Status: SHIPPED | OUTPATIENT
Start: 2023-01-18

## 2023-01-23 ENCOUNTER — APPOINTMENT (OUTPATIENT)
Dept: PHYSICAL THERAPY | Age: 53
End: 2023-01-23
Payer: MEDICARE

## 2023-01-25 ENCOUNTER — APPOINTMENT (OUTPATIENT)
Dept: PHYSICAL THERAPY | Age: 53
End: 2023-01-25
Payer: MEDICARE

## 2023-01-30 ENCOUNTER — APPOINTMENT (OUTPATIENT)
Dept: PHYSICAL THERAPY | Age: 53
End: 2023-01-30
Payer: MEDICARE

## 2023-02-01 ENCOUNTER — APPOINTMENT (OUTPATIENT)
Dept: PHYSICAL THERAPY | Age: 53
End: 2023-02-01
Payer: MEDICARE

## 2023-03-24 DIAGNOSIS — E11.21 TYPE 2 DIABETES MELLITUS WITH DIABETIC NEPHROPATHY, WITHOUT LONG-TERM CURRENT USE OF INSULIN (HCC): ICD-10-CM

## 2023-03-24 NOTE — TELEPHONE ENCOUNTER
Please review; protocol failed.  Or has no protocol    Requested Prescriptions   Pending Prescriptions Disp Refills    REPAGLINIDE 1 MG Oral Tab [Pharmacy Med Name: REPAGLINIDE 1MG TABLETS] 270 tablet 0     Sig: TAKE 1 TABLET(1 MG) BY MOUTH THREE TIMES DAILY BEFORE MEALS       Diabetes Medication Protocol Failed - 3/24/2023  7:32 AM        Failed - Last A1C < 7.5 and within past 6 months     Lab Results   Component Value Date    A1C 6.6 (A) 07/02/2022             Failed - EGFRCR or GFRNAA > 50     GFR Evaluation  EGFRCR: 12 , resulted on 10/27/2022          Passed - In person appointment or virtual visit in the past 6 mos or appointment in next 3 mos     Recent Outpatient Visits              2 months ago     [facility] in [address] Visit    3 months ago     [facility] in [facility] 18    3 months ago     [facility] in [facility] 18    3 months ago     [facility] in [facility] 18    3 months ago     [facility] in [facility] [address] [place]    Office Visit          Future Appointments         Provider Department Appt Notes    Tomorrow MD Jaime Louise, [facility] [address], [facility] visit/Follow up               Passed - GFR in the past 12 months              Recent Outpatient Visits              2 months ago     [facility] in [facility] [place] [address]    3 months ago     [facility] in [facility] [place] [address]    3 months ago     [facility]    3 months ago     [facility] in [facility] [place] [address]    3 months ago     [facility] in [facility], [address] [place]    Office Visit           Future Appointments         Provider Department Appt Notes    [address], Robbie Robertson MD [address] [place] [address] Chinmay BEY visit/Follow up

## 2023-03-25 RX ORDER — REPAGLINIDE 1 MG/1
1 TABLET ORAL
Qty: 270 TABLET | Refills: 0 | Status: SHIPPED | OUTPATIENT
Start: 2023-03-25

## 2023-05-17 ENCOUNTER — TELEPHONE (OUTPATIENT)
Dept: FAMILY MEDICINE CLINIC | Facility: CLINIC | Age: 53
End: 2023-05-17

## 2023-05-17 NOTE — TELEPHONE ENCOUNTER
Patient calling to ask for refill, stated needs refill for all medications except Tradjenta, could not verify medication's names.

## 2023-05-17 NOTE — TELEPHONE ENCOUNTER
Pt was called and inform we need to know what medication he needs refills on. Pt stated that he is currently in dialysis but he will call us tomorrow with the names of the medications that he needs refilled.

## 2023-05-18 DIAGNOSIS — E78.2 MIXED HYPERLIPIDEMIA: ICD-10-CM

## 2023-05-18 DIAGNOSIS — E11.21 TYPE 2 DIABETES MELLITUS WITH DIABETIC NEPHROPATHY, WITHOUT LONG-TERM CURRENT USE OF INSULIN (HCC): ICD-10-CM

## 2023-05-19 RX ORDER — ROSUVASTATIN CALCIUM 20 MG/1
20 TABLET, COATED ORAL NIGHTLY
Qty: 90 TABLET | Refills: 3 | Status: SHIPPED | OUTPATIENT
Start: 2023-05-19

## 2023-05-19 NOTE — TELEPHONE ENCOUNTER
Refill passed per [place] [employer], [facility] protocol.     Requested Prescriptions   Pending Prescriptions Disp Refills    ROSUVASTATIN 20 MG Oral Tab [Pharmacy Med Name: ROSUVASTATIN 20MG TABLETS] 90 tablet 1     Sig: TAKE 1 TABLET(20 MG) BY MOUTH EVERY NIGHT       Cholesterol Medication Protocol Passed - 5/18/2023  5:52 PM        Passed - ALT in past 12 months        Passed - LDL in past 12 months        Passed - Last ALT < 80     Lab Results   Component Value Date    ALT 54 10/27/2022             Passed - Last LDL < 130     Lab Results   Component Value Date    LDL 63 05/21/2022               Passed - In person appointment or virtual visit in the past 12 mos or appointment in next 3 mos     Recent Outpatient Visits              4 months ago     [facility] in [address] Visit    5 months ago     [facility]    5 months ago     JE[facility] English [facility] [place] [address]    5 months ago     [facility]FF[facility] English [facility] 18    5 months ago     [facility] in [place], [place]    Office Visit          Future Appointments         Provider Department Appt Notes    In 4 weeks Zeferino Huertas MD [address] [facility], [address], [facility] [address] informed                    Recent Outpatient Visits              4 months ago     [facility] in [facility] [address]    5 months ago     [facility] in [facility] [place] [address]    5 months ago     [facility] in [facility] [place] [address]    5 months ago     [facility] in [facility] [address]    5 months ago     [facility] in [address] Visit            Future Appointments         Provider Department Appt Notes    In 4 weeks Artur Ruth MD [address] [facility], [place] medicare wellness - policy informed

## 2023-06-17 ENCOUNTER — EKG ENCOUNTER (OUTPATIENT)
Dept: LAB | Age: 53
End: 2023-06-17
Attending: FAMILY MEDICINE
Payer: MEDICARE

## 2023-06-17 ENCOUNTER — OFFICE VISIT (OUTPATIENT)
Dept: FAMILY MEDICINE CLINIC | Facility: CLINIC | Age: 53
End: 2023-06-17

## 2023-06-17 ENCOUNTER — LAB ENCOUNTER (OUTPATIENT)
Dept: LAB | Age: 53
End: 2023-06-17
Attending: FAMILY MEDICINE
Payer: MEDICARE

## 2023-06-17 VITALS
HEIGHT: 70 IN | DIASTOLIC BLOOD PRESSURE: 62 MMHG | HEART RATE: 62 BPM | WEIGHT: 230.38 LBS | BODY MASS INDEX: 32.98 KG/M2 | SYSTOLIC BLOOD PRESSURE: 130 MMHG

## 2023-06-17 DIAGNOSIS — R35.1 NOCTURIA: ICD-10-CM

## 2023-06-17 DIAGNOSIS — Z12.11 COLON CANCER SCREENING: ICD-10-CM

## 2023-06-17 DIAGNOSIS — N18.5 CHRONIC KIDNEY DISEASE, STAGE V (HCC): ICD-10-CM

## 2023-06-17 DIAGNOSIS — E11.21 TYPE 2 DIABETES MELLITUS WITH DIABETIC NEPHROPATHY, WITHOUT LONG-TERM CURRENT USE OF INSULIN (HCC): ICD-10-CM

## 2023-06-17 DIAGNOSIS — Z91.81 RISK FOR FALLS: ICD-10-CM

## 2023-06-17 DIAGNOSIS — I10 ESSENTIAL HYPERTENSION: ICD-10-CM

## 2023-06-17 DIAGNOSIS — E55.9 VITAMIN D DEFICIENCY: ICD-10-CM

## 2023-06-17 DIAGNOSIS — E66.09 CLASS 1 OBESITY DUE TO EXCESS CALORIES WITHOUT SERIOUS COMORBIDITY WITH BODY MASS INDEX (BMI) OF 34.0 TO 34.9 IN ADULT: ICD-10-CM

## 2023-06-17 DIAGNOSIS — E78.2 MIXED HYPERLIPIDEMIA: ICD-10-CM

## 2023-06-17 DIAGNOSIS — Z00.00 MEDICARE ANNUAL WELLNESS VISIT, INITIAL: Primary | ICD-10-CM

## 2023-06-17 PROBLEM — G20 PARKINSON'S DISEASE (HCC): Status: ACTIVE | Noted: 2023-06-17

## 2023-06-17 PROBLEM — G20.A1 PARKINSON'S DISEASE: Status: ACTIVE | Noted: 2023-06-17

## 2023-06-17 PROBLEM — G20 PARKINSON'S DISEASE: Status: ACTIVE | Noted: 2023-06-17

## 2023-06-17 PROBLEM — G20.A1 PARKINSON'S DISEASE (HCC): Status: ACTIVE | Noted: 2023-06-17

## 2023-06-17 LAB
ALBUMIN SERPL-MCNC: 4.1 G/DL (ref 3.4–5)
ALBUMIN/GLOB SERPL: 1.1 {RATIO} (ref 1–2)
ALP LIVER SERPL-CCNC: 115 U/L
ALT SERPL-CCNC: 48 U/L
ANION GAP SERPL CALC-SCNC: 2 MMOL/L (ref 0–18)
AST SERPL-CCNC: 32 U/L (ref 15–37)
BASOPHILS # BLD AUTO: 0.09 X10(3) UL (ref 0–0.2)
BASOPHILS NFR BLD AUTO: 1.3 %
BILIRUB SERPL-MCNC: 0.8 MG/DL (ref 0.1–2)
BILIRUB UR QL STRIP.AUTO: NEGATIVE
BUN BLD-MCNC: 24 MG/DL (ref 7–18)
CALCIUM BLD-MCNC: 9.8 MG/DL (ref 8.5–10.1)
CHLORIDE SERPL-SCNC: 99 MMOL/L (ref 98–112)
CHOLEST SERPL-MCNC: 108 MG/DL (ref ?–200)
CLARITY UR REFRACT.AUTO: CLEAR
CO2 SERPL-SCNC: 39 MMOL/L (ref 21–32)
COLOR UR AUTO: YELLOW
CREAT BLD-MCNC: 4.79 MG/DL
CREAT UR-SCNC: 84.2 MG/DL
EOSINOPHIL # BLD AUTO: 0.17 X10(3) UL (ref 0–0.7)
EOSINOPHIL NFR BLD AUTO: 2.4 %
ERYTHROCYTE [DISTWIDTH] IN BLOOD BY AUTOMATED COUNT: 12 %
EST. AVERAGE GLUCOSE BLD GHB EST-MCNC: 166 MG/DL (ref 68–126)
FASTING PATIENT LIPID ANSWER: YES
FASTING STATUS PATIENT QL REPORTED: YES
GFR SERPLBLD BASED ON 1.73 SQ M-ARVRAT: 14 ML/MIN/1.73M2 (ref 60–?)
GLOBULIN PLAS-MCNC: 3.6 G/DL (ref 2.8–4.4)
GLUCOSE BLD-MCNC: 77 MG/DL (ref 70–99)
GLUCOSE UR STRIP.AUTO-MCNC: 50 MG/DL
HBA1C MFR BLD: 7.4 % (ref ?–5.7)
HCT VFR BLD AUTO: 36.6 %
HDLC SERPL-MCNC: 45 MG/DL (ref 40–59)
HGB BLD-MCNC: 12.2 G/DL
IMM GRANULOCYTES # BLD AUTO: 0.02 X10(3) UL (ref 0–1)
IMM GRANULOCYTES NFR BLD: 0.3 %
KETONES UR STRIP.AUTO-MCNC: NEGATIVE MG/DL
LDLC SERPL CALC-MCNC: 44 MG/DL (ref ?–100)
LEUKOCYTE ESTERASE UR QL STRIP.AUTO: NEGATIVE
LYMPHOCYTES # BLD AUTO: 1.68 X10(3) UL (ref 1–4)
LYMPHOCYTES NFR BLD AUTO: 23.8 %
MCH RBC QN AUTO: 32.3 PG (ref 26–34)
MCHC RBC AUTO-ENTMCNC: 33.3 G/DL (ref 31–37)
MCV RBC AUTO: 96.8 FL
MICROALBUMIN UR-MCNC: 74.5 MG/DL
MICROALBUMIN/CREAT 24H UR-RTO: 884.8 UG/MG (ref ?–30)
MONOCYTES # BLD AUTO: 0.72 X10(3) UL (ref 0.1–1)
MONOCYTES NFR BLD AUTO: 10.2 %
NEUTROPHILS # BLD AUTO: 4.37 X10 (3) UL (ref 1.5–7.7)
NEUTROPHILS # BLD AUTO: 4.37 X10(3) UL (ref 1.5–7.7)
NEUTROPHILS NFR BLD AUTO: 62 %
NITRITE UR QL STRIP.AUTO: NEGATIVE
NONHDLC SERPL-MCNC: 63 MG/DL (ref ?–130)
OSMOLALITY SERPL CALC.SUM OF ELEC: 293 MOSM/KG (ref 275–295)
PH UR STRIP.AUTO: 9 [PH] (ref 5–8)
PLATELET # BLD AUTO: 225 10(3)UL (ref 150–450)
POTASSIUM SERPL-SCNC: 4.3 MMOL/L (ref 3.5–5.1)
PROT SERPL-MCNC: 7.7 G/DL (ref 6.4–8.2)
PROT UR STRIP.AUTO-MCNC: >=500 MG/DL
PSA SERPL-MCNC: 1.34 NG/ML (ref ?–4)
RBC # BLD AUTO: 3.78 X10(6)UL
RBC UR QL AUTO: NEGATIVE
SODIUM SERPL-SCNC: 140 MMOL/L (ref 136–145)
SP GR UR STRIP.AUTO: 1.01 (ref 1–1.03)
TRIGL SERPL-MCNC: 100 MG/DL (ref 30–149)
TSI SER-ACNC: 0.81 MIU/ML (ref 0.36–3.74)
UROBILINOGEN UR STRIP.AUTO-MCNC: <2 MG/DL
VIT D+METAB SERPL-MCNC: 37.9 NG/ML (ref 30–100)
VLDLC SERPL CALC-MCNC: 14 MG/DL (ref 0–30)
WBC # BLD AUTO: 7.1 X10(3) UL (ref 4–11)

## 2023-06-17 PROCEDURE — 80053 COMPREHEN METABOLIC PANEL: CPT

## 2023-06-17 PROCEDURE — 84153 ASSAY OF PSA TOTAL: CPT

## 2023-06-17 PROCEDURE — 82043 UR ALBUMIN QUANTITATIVE: CPT

## 2023-06-17 PROCEDURE — 36415 COLL VENOUS BLD VENIPUNCTURE: CPT

## 2023-06-17 PROCEDURE — 82570 ASSAY OF URINE CREATININE: CPT

## 2023-06-17 PROCEDURE — 3051F HG A1C>EQUAL 7.0%<8.0%: CPT | Performed by: FAMILY MEDICINE

## 2023-06-17 PROCEDURE — 93010 ELECTROCARDIOGRAM REPORT: CPT | Performed by: INTERNAL MEDICINE

## 2023-06-17 PROCEDURE — 85025 COMPLETE CBC W/AUTO DIFF WBC: CPT

## 2023-06-17 PROCEDURE — 83036 HEMOGLOBIN GLYCOSYLATED A1C: CPT

## 2023-06-17 PROCEDURE — 81001 URINALYSIS AUTO W/SCOPE: CPT

## 2023-06-17 PROCEDURE — 82306 VITAMIN D 25 HYDROXY: CPT

## 2023-06-17 PROCEDURE — 80061 LIPID PANEL: CPT

## 2023-06-17 PROCEDURE — 84443 ASSAY THYROID STIM HORMONE: CPT

## 2023-06-17 PROCEDURE — 93005 ELECTROCARDIOGRAM TRACING: CPT

## 2023-06-17 RX ORDER — ROSUVASTATIN CALCIUM 20 MG/1
20 TABLET, COATED ORAL NIGHTLY
Qty: 90 TABLET | Refills: 3 | Status: SHIPPED | OUTPATIENT
Start: 2023-06-17

## 2023-06-17 RX ORDER — PEN NEEDLE, DIABETIC 32GX 5/32"
NEEDLE, DISPOSABLE MISCELLANEOUS
Qty: 100 EACH | Refills: 3 | Status: SHIPPED | OUTPATIENT
Start: 2023-06-17

## 2023-06-17 RX ORDER — INSULIN DEGLUDEC 200 U/ML
28 INJECTION, SOLUTION SUBCUTANEOUS NIGHTLY
Qty: 18 ML | Refills: 0 | Status: SHIPPED | OUTPATIENT
Start: 2023-06-17

## 2023-06-17 RX ORDER — FLASH GLUCOSE SENSOR
KIT MISCELLANEOUS
Qty: 2 EACH | Refills: 11 | Status: SHIPPED | OUTPATIENT
Start: 2023-06-17

## 2023-06-17 RX ORDER — PEN NEEDLE, DIABETIC 32GX 5/32"
NEEDLE, DISPOSABLE MISCELLANEOUS
Qty: 100 EACH | Refills: 3 | Status: CANCELLED | OUTPATIENT
Start: 2023-06-17

## 2023-06-17 RX ORDER — PEN NEEDLE, DIABETIC 32GX 5/32"
1 NEEDLE, DISPOSABLE MISCELLANEOUS AS DIRECTED
Qty: 100 EACH | Refills: 6 | Status: SHIPPED | OUTPATIENT
Start: 2023-06-17

## 2023-06-17 RX ORDER — LINAGLIPTIN 5 MG/1
5 TABLET, FILM COATED ORAL DAILY
Qty: 90 TABLET | Refills: 2 | Status: SHIPPED | OUTPATIENT
Start: 2023-06-17

## 2023-06-19 LAB
ATRIAL RATE: 62 BPM
P AXIS: 21 DEGREES
P-R INTERVAL: 166 MS
Q-T INTERVAL: 444 MS
QRS DURATION: 86 MS
QTC CALCULATION (BEZET): 450 MS
R AXIS: 41 DEGREES
T AXIS: 51 DEGREES
VENTRICULAR RATE: 62 BPM

## 2023-07-25 ENCOUNTER — MED REC SCAN ONLY (OUTPATIENT)
Facility: CLINIC | Age: 53
End: 2023-07-25

## 2023-08-11 NOTE — TELEPHONE ENCOUNTER
Cullen Bernabe is requesting a refill of zolmitriptan (Zomig) 5 MG tablet for a 10 tablets and would like sent to local pharmacy.     Patient is aware that the PCP is out of the office today.     Referral signed

## 2023-08-17 ENCOUNTER — TELEPHONE (OUTPATIENT)
Dept: FAMILY MEDICINE CLINIC | Facility: CLINIC | Age: 53
End: 2023-08-17

## 2023-08-17 NOTE — TELEPHONE ENCOUNTER
Pharmacy    [id number] - [place], [place] - [address], [phone], [phone]      Disp Refills Start End    rosuvastatin 20 MG Oral Tab 90 tablet 3 6/17/2023     Sig - Route: Take 1 tablet (20 mg total) by mouth nightly. - Oral    Sent to pharmacy as: Rosuvastatin Calcium 20 MG Oral Tablet (Crestor)    E-Prescribing Status: Receipt confirmed by pharmacy (6/17/2023  8:41 AM CDT)    Susan Robles at [address] advised. Has Rx.  Disregard request.

## 2023-08-17 NOTE — TELEPHONE ENCOUNTER
Current Outpatient Medications:     rosuvastatin 20 MG Oral Tab, Take 1 tablet (20 mg total) by mouth nightly., Disp: 90 tablet, Rfl: 3

## 2023-09-18 DIAGNOSIS — E11.21 TYPE 2 DIABETES MELLITUS WITH DIABETIC NEPHROPATHY, WITHOUT LONG-TERM CURRENT USE OF INSULIN (HCC): ICD-10-CM

## 2023-09-18 NOTE — TELEPHONE ENCOUNTER
Pharmacy requesting refill      Repaglinide 1 MG Oral Tab, Take 1 tablet (1 mg total) by mouth 3 (three) times daily before meals. , Disp: 270 tablet, Rfl: 0

## 2023-09-19 ENCOUNTER — TELEPHONE (OUTPATIENT)
Dept: FAMILY MEDICINE CLINIC | Facility: CLINIC | Age: 53
End: 2023-09-19

## 2023-09-19 RX ORDER — REPAGLINIDE 1 MG/1
1 TABLET ORAL
Qty: 270 TABLET | Refills: 0 | Status: SHIPPED | OUTPATIENT
Start: 2023-09-19

## 2023-09-19 NOTE — TELEPHONE ENCOUNTER
Please review; protocol failed. Requested Prescriptions   Pending Prescriptions Disp Refills    Repaglinide 1 MG Oral Tab 270 tablet 0     Sig: Take 1 tablet (1 mg total) by mouth 3 (three) times daily before meals.        Diabetes Medication Protocol Failed - 9/18/2023  1:48 PM        Failed - EGFRCR or GFRNAA > 50     GFR Evaluation  EGFRCR: 14 , resulted on 6/17/2023          Passed - Last A1C < 7.5 and within past 6 months     Lab Results   Component Value Date    A1C 7.4 (H) 06/17/2023             Passed - In person appointment or virtual visit in the past 6 mos or appointment in next 3 mos     Recent Outpatient Visits              3 months ago Medicare annual wellness visit, initial    Wade Botello MD    Office Visit    8 months ago     [facility], [place], [address]    9 months ago     [facility] in [facility], [place], [address]    9 months ago     [facility] in [facility], [place], [address]    9 months ago     [facility] in [facility], [place], [place]    Office Visit          Future Appointments         Provider Department Appt Notes    In 2 months Chas Gallego MD [address], [address], [place] Follow up                    Passed - GFR in the past 12 months           Recent Outpatient Visits              3 months ago Medicare annual wellness visit, initial    Wade Botello MD    Office Visit    8 months ago     [facility] in [facility], [place], [address]    9 months ago     [facility] in [facility], [place], [address]    9 months ago     [facility] in [facility], [place], [address]    9 months ago     [facility] in [facility], [place], [place]    Office Visit          Future Appointments Provider Department Appt Notes    In 2 months MD Iris Vogel, [place] Follow up

## 2023-09-19 NOTE — TELEPHONE ENCOUNTER
Received short term disability forms from [address] [place] [address]. Per Dr Sonal Rodriguez patient needs an appointment. Please call patient to schedule an appointment with Dr Sonal Rodriguez.

## 2023-09-20 DIAGNOSIS — I10 ESSENTIAL HYPERTENSION: ICD-10-CM

## 2023-09-20 NOTE — TELEPHONE ENCOUNTER
Pharmacy requesting refill     amLODIPine 10 MG Oral Tab Take 1 tablet (10 mg total) by mouth daily.  90 tablet 1

## 2023-09-21 RX ORDER — AMLODIPINE BESYLATE 10 MG/1
10 TABLET ORAL DAILY
Qty: 90 TABLET | Refills: 3 | Status: SHIPPED | OUTPATIENT
Start: 2023-09-21

## 2023-09-21 NOTE — TELEPHONE ENCOUNTER
Please review; protocol failed. Requested Prescriptions   Pending Prescriptions Disp Refills    amLODIPine 10 MG Oral Tab 90 tablet 3     Sig: Take 1 tablet (10 mg total) by mouth daily.        Hypertensive Medications Protocol Failed - 9/20/2023 12:24 PM        Failed - EGFRCR or GFRNAA > 50     GFR Evaluation  EGFRCR: 14 , resulted on 6/17/2023          Passed - In person appointment in the past 12 or next 3 months     Recent Outpatient Visits              3 months ago Medicare annual wellness visit, initial    [address] [facility], [place] James Pathak MD    Office Visit    8 months ago     [facility] in [facility], [address] Visit    9 months ago     [facility] in [facility], [facility] 18    9 months ago     [facility] in [facility], [facility] 18    9 months ago     [facility] English in [facility], [address] [facility]    Office Visit          Future Appointments         Provider Department Appt Notes    In 1 month James Pathak MD [address] [facility] [address], [facility], [place] Follow up                    Passed - Last BP reading less than 140/90     BP Readings from Last 1 Encounters:  06/17/23 : 130/62              Passed - CMP or BMP in past 6 months     Recent Results (from the past 4392 hour(s))   Comp Metabolic Panel (14)    Collection Time: 06/17/23  9:05 AM   Result Value Ref Range    Glucose 77 70 - 99 mg/dL    Sodium 140 136 - 145 mmol/L    Potassium 4.3 3.5 - 5.1 mmol/L    Chloride 99 98 - 112 mmol/L    CO2 39.0 (H) 21.0 - 32.0 mmol/L    Anion Gap 2 0 - 18 mmol/L    BUN 24 (H) 7 - 18 mg/dL    Creatinine 4.79 (H) 0.70 - 1.30 mg/dL    Calcium, Total 9.8 8.5 - 10.1 mg/dL    Calculated Osmolality 293 275 - 295 mOsm/kg    eGFR-Cr 14 (L) >=60 mL/min/1.73m2    AST 32 15 - 37 U/L    ALT 48 16 - 61 U/L    Alkaline Phosphatase 115 45 - 117 U/L    Bilirubin, Total 0.8 0.1 - 2.0 mg/dL Total Protein 7.7 6.4 - 8.2 g/dL    Albumin 4.1 3.4 - 5.0 g/dL    Globulin  3.6 2.8 - 4.4 g/dL    A/G Ratio 1.1 1.0 - 2.0    Patient Fasting for CMP? Yes      *Note: Due to a large number of results and/or encounters for the requested time period, some results have not been displayed. A complete set of results can be found in Results Review.                Passed - In person appointment or virtual visit in the past 6 months     Recent Outpatient Visits              3 months ago Medicare annual wellness visit, initial    [address] Galvez MD    Office Visit    8 months ago     [facility] in [facility], [place], [address] [place] Visit    9 months ago     [facility] in [facility], [place], [address]    9 months ago     [facility], [place], [address]    9 months ago     [facility], [place], [place]    Office Visit          Future Appointments         Provider Department Appt Notes    In 1 month Artur Ruth MD [address], [place] Follow up                       Recent Outpatient Visits              3 months ago Medicare annual wellness visit, initial    [address] [facility]    Office Visit    8 months ago     [facility] in [facility], [place], [address]    9 months ago     [facility], [place], [address]    9 months ago     [facility], [place], [address]    9 months ago     [facility], [place], [address] Visit          Future Appointments         Provider Department Appt Notes    In 1 month Artur Ruth MD [address], [address], [place] Follow up

## 2023-10-12 ENCOUNTER — TELEPHONE (OUTPATIENT)
Dept: FAMILY MEDICINE CLINIC | Facility: CLINIC | Age: 53
End: 2023-10-12

## 2023-10-12 NOTE — TELEPHONE ENCOUNTER
Patient requesting referral to endocrinologist that is close to his residence, [place], [place] or in the [facility] to treat his diabetes. Please call at [phone],thanks.

## 2023-10-14 NOTE — TELEPHONE ENCOUNTER
I have been managing his diabetes. If needs further questions have come to the office with an appt.

## 2023-10-16 ENCOUNTER — MED REC SCAN ONLY (OUTPATIENT)
Dept: FAMILY MEDICINE CLINIC | Facility: CLINIC | Age: 53
End: 2023-10-16

## 2023-10-17 NOTE — TELEPHONE ENCOUNTER
Phone call made s/t pt per pt his kidney transplant facility recommended that he see endo due to fluctuating blood sugars and because of his transplant. Per patient if Dr Peng considers its best to continue with him would like to know if he should keep appointment for 11/18 or should he make a sooner appointment. Dr Peng please advise.

## 2023-10-19 NOTE — TELEPHONE ENCOUNTER
Patient called office to f/u, call transferred from phone room. Per pt requesting a sooner appointment due to sugars running tin the 200's and recent transplant. Appt scheduled for 10/21 @ 930. Patient advised to bring all medications and glucose readings. Pt verbalized understanding.

## 2023-10-21 ENCOUNTER — OFFICE VISIT (OUTPATIENT)
Dept: FAMILY MEDICINE CLINIC | Facility: CLINIC | Age: 53
End: 2023-10-21

## 2023-10-21 VITALS
SYSTOLIC BLOOD PRESSURE: 144 MMHG | HEART RATE: 61 BPM | DIASTOLIC BLOOD PRESSURE: 72 MMHG | HEIGHT: 70 IN | BODY MASS INDEX: 32.93 KG/M2 | WEIGHT: 230 LBS

## 2023-10-21 DIAGNOSIS — E11.21 TYPE 2 DIABETES MELLITUS WITH DIABETIC NEPHROPATHY, WITHOUT LONG-TERM CURRENT USE OF INSULIN (HCC): Primary | ICD-10-CM

## 2023-10-21 DIAGNOSIS — I10 ESSENTIAL HYPERTENSION: ICD-10-CM

## 2023-10-21 DIAGNOSIS — N18.5 CHRONIC KIDNEY DISEASE, STAGE V (HCC): ICD-10-CM

## 2023-10-21 PROBLEM — I77.0 ARTERIOVENOUS FISTULA (HCC): Status: ACTIVE | Noted: 2023-10-21

## 2023-10-21 PROBLEM — J41.0 SMOKERS' COUGH (HCC): Chronic | Status: ACTIVE | Noted: 2023-10-21

## 2023-10-21 PROBLEM — I77.0 ARTERIOVENOUS FISTULA: Status: ACTIVE | Noted: 2023-10-21

## 2023-10-21 PROCEDURE — 3008F BODY MASS INDEX DOCD: CPT | Performed by: FAMILY MEDICINE

## 2023-10-21 PROCEDURE — 3078F DIAST BP <80 MM HG: CPT | Performed by: FAMILY MEDICINE

## 2023-10-21 PROCEDURE — 3077F SYST BP >= 140 MM HG: CPT | Performed by: FAMILY MEDICINE

## 2023-10-21 PROCEDURE — 99214 OFFICE O/P EST MOD 30 MIN: CPT | Performed by: FAMILY MEDICINE

## 2023-10-21 RX ORDER — ASPIRIN 81 MG/1
81 TABLET ORAL DAILY
Qty: 90 TABLET | Refills: 2 | Status: SHIPPED | OUTPATIENT
Start: 2023-10-21

## 2023-10-21 RX ORDER — INSULIN LISPRO 200 [IU]/ML
INJECTION, SOLUTION SUBCUTANEOUS
Qty: 5 EACH | Refills: 3 | Status: SHIPPED | OUTPATIENT
Start: 2023-10-21

## 2023-10-21 RX ORDER — LINAGLIPTIN 5 MG/1
5 TABLET, FILM COATED ORAL DAILY
Qty: 90 TABLET | Refills: 2 | Status: SHIPPED | OUTPATIENT
Start: 2023-10-21

## 2023-10-21 RX ORDER — ROSUVASTATIN CALCIUM 20 MG/1
20 TABLET, COATED ORAL NIGHTLY
Qty: 90 TABLET | Refills: 3 | Status: SHIPPED | OUTPATIENT
Start: 2023-10-21

## 2023-10-21 RX ORDER — VALGANCICLOVIR 450 MG/1
450 TABLET, FILM COATED ORAL DAILY
COMMUNITY

## 2023-10-21 RX ORDER — INSULIN DEGLUDEC 200 U/ML
30 INJECTION, SOLUTION SUBCUTANEOUS NIGHTLY
Qty: 18 ML | Refills: 0 | Status: SHIPPED | OUTPATIENT
Start: 2023-10-21

## 2023-10-21 RX ORDER — SULFAMETHOXAZOLE AND TRIMETHOPRIM 400; 80 MG/1; MG/1
1 TABLET ORAL 2 TIMES DAILY
COMMUNITY

## 2023-10-21 RX ORDER — ASPIRIN 81 MG/1
81 TABLET ORAL DAILY
COMMUNITY
Start: 2023-08-14 | End: 2023-10-21

## 2023-10-21 RX ORDER — MYCOPHENOLIC ACID 360 MG/1
720 TABLET, DELAYED RELEASE ORAL
COMMUNITY

## 2023-10-21 RX ORDER — TACROLIMUS 1 MG/1
CAPSULE ORAL 2 TIMES DAILY
COMMUNITY

## 2023-10-21 RX ORDER — PREDNISONE 5 MG/1
5 TABLET ORAL DAILY
COMMUNITY

## 2023-10-21 RX ORDER — CARVEDILOL 3.12 MG/1
3.12 TABLET ORAL 2 TIMES DAILY WITH MEALS
COMMUNITY
End: 2023-10-21

## 2023-10-21 RX ORDER — AMLODIPINE BESYLATE 10 MG/1
10 TABLET ORAL DAILY
Qty: 90 TABLET | Refills: 3 | Status: SHIPPED | OUTPATIENT
Start: 2023-10-21

## 2023-10-21 RX ORDER — CARVEDILOL 3.12 MG/1
3.12 TABLET ORAL 2 TIMES DAILY WITH MEALS
Qty: 180 TABLET | Refills: 1 | Status: SHIPPED | OUTPATIENT
Start: 2023-10-21

## 2023-10-21 RX ORDER — PEN NEEDLE, DIABETIC 32GX 5/32"
1 NEEDLE, DISPOSABLE MISCELLANEOUS AS DIRECTED
Qty: 100 EACH | Refills: 6 | Status: SHIPPED | OUTPATIENT
Start: 2023-10-21

## 2023-10-21 RX ORDER — CINACALCET 60 MG/1
1 TABLET, FILM COATED ORAL
COMMUNITY
Start: 2022-10-12

## 2023-10-21 RX ORDER — REPAGLINIDE 1 MG/1
1 TABLET ORAL
Qty: 270 TABLET | Refills: 0 | Status: SHIPPED | OUTPATIENT
Start: 2023-10-21

## 2023-10-23 ENCOUNTER — TELEPHONE (OUTPATIENT)
Dept: FAMILY MEDICINE CLINIC | Facility: CLINIC | Age: 53
End: 2023-10-23

## 2023-10-23 NOTE — TELEPHONE ENCOUNTER
Current Outpatient Medications:     Insulin Pen Needle (BD PEN NEEDLE RUFUS 2ND GEN) 32G X 4 MM Does not apply Misc, Take 1 Bottle by mouth As Directed., Disp: 100 each, Rfl: 6      Message: Plan requires specific direction to process the prescription. Please fax back with frequency of how patient will be usingthe medication. NOT AN ORAL ITEM.

## 2023-11-03 ENCOUNTER — TELEPHONE (OUTPATIENT)
Dept: FAMILY MEDICINE CLINIC | Facility: CLINIC | Age: 53
End: 2023-11-03

## 2023-11-03 NOTE — TELEPHONE ENCOUNTER
Patient is calling to request refill on the following prescriptions and mentions he is running low    Carvedilol    Blood pressure medication    Patient mentions he has a few other prescriptions with no refills remaining that he will also need refilled but does not know the name. Please advise.

## 2023-11-03 NOTE — TELEPHONE ENCOUNTER
Verified name and . Patient was advised that Dr. Yunier Sanchez sent refills for all active medication prescribed by Dr. Yunier Sanchez on 10/21/23 with refills to [facility] in [place]- medication list verified. Patient verbalized understanding and had no further questions at this time.

## 2023-11-07 RX ORDER — PEN NEEDLE, DIABETIC 32GX 5/32"
1 NEEDLE, DISPOSABLE MISCELLANEOUS DAILY
Qty: 100 EACH | Refills: 6 | Status: SHIPPED | OUTPATIENT
Start: 2023-11-07

## 2023-11-14 ENCOUNTER — TELEPHONE (OUTPATIENT)
Dept: FAMILY MEDICINE CLINIC | Facility: CLINIC | Age: 53
End: 2023-11-14

## 2023-11-14 NOTE — TELEPHONE ENCOUNTER
[employer] rep called to obtain our fax to send phi request to us. Provided rep with our fax number and our turn around time. Also informed rep to please send signed [employer] auth with forms. All questions answered.

## 2023-11-21 ENCOUNTER — OFFICE VISIT (OUTPATIENT)
Dept: FAMILY MEDICINE CLINIC | Facility: CLINIC | Age: 53
End: 2023-11-21

## 2023-11-21 VITALS
HEIGHT: 70 IN | BODY MASS INDEX: 34.1 KG/M2 | HEART RATE: 54 BPM | SYSTOLIC BLOOD PRESSURE: 128 MMHG | WEIGHT: 238.19 LBS | DIASTOLIC BLOOD PRESSURE: 80 MMHG

## 2023-11-21 DIAGNOSIS — E11.21 TYPE 2 DIABETES MELLITUS WITH DIABETIC NEPHROPATHY, WITHOUT LONG-TERM CURRENT USE OF INSULIN (HCC): Primary | ICD-10-CM

## 2023-11-21 DIAGNOSIS — N18.5 CHRONIC KIDNEY DISEASE, STAGE V (HCC): ICD-10-CM

## 2023-11-21 DIAGNOSIS — I10 ESSENTIAL HYPERTENSION: ICD-10-CM

## 2023-11-21 DIAGNOSIS — E66.01 CLASS 2 SEVERE OBESITY DUE TO EXCESS CALORIES WITH SERIOUS COMORBIDITY AND BODY MASS INDEX (BMI) OF 35.0 TO 35.9 IN ADULT: ICD-10-CM

## 2023-11-21 PROCEDURE — 3008F BODY MASS INDEX DOCD: CPT | Performed by: FAMILY MEDICINE

## 2023-11-21 PROCEDURE — 3079F DIAST BP 80-89 MM HG: CPT | Performed by: FAMILY MEDICINE

## 2023-11-21 PROCEDURE — 3074F SYST BP LT 130 MM HG: CPT | Performed by: FAMILY MEDICINE

## 2023-11-21 PROCEDURE — 99214 OFFICE O/P EST MOD 30 MIN: CPT | Performed by: FAMILY MEDICINE

## 2023-11-21 RX ORDER — TIRZEPATIDE 5 MG/.5ML
5 INJECTION, SOLUTION SUBCUTANEOUS WEEKLY
Qty: 0.5 ML | Refills: 5 | Status: SHIPPED | OUTPATIENT
Start: 2023-11-21

## 2023-11-21 RX ORDER — TIRZEPATIDE 2.5 MG/.5ML
2.5 INJECTION, SOLUTION SUBCUTANEOUS
Qty: 0.5 ML | Refills: 0 | Status: SHIPPED | OUTPATIENT
Start: 2023-11-21 | End: 2023-12-21

## 2023-12-11 NOTE — TELEPHONE ENCOUNTER
Called pt to obtain details. Pt stts he is currently off due to kidney transplant he had in Aug. Pt stts his surgeon has him off. Informed pt if surgeon has him off, forms may  need to go to that provider. Pt stts surgeon does complete forms for him. I advised pt to call [employer] and ask if these forms needed completion or if he is up to date with them. Pt verbalized understanding. Pt stts he will call and call us back. Informed pt we will hold these forms. Forms placed in LMTCB/PTS ON HOLD folder.

## 2023-12-12 ENCOUNTER — TELEPHONE (OUTPATIENT)
Dept: FAMILY MEDICINE CLINIC | Facility: CLINIC | Age: 53
End: 2023-12-12

## 2023-12-12 RX ORDER — TIRZEPATIDE 2.5 MG/.5ML
INJECTION, SOLUTION SUBCUTANEOUS
Qty: 2 ML | Refills: 0 | OUTPATIENT
Start: 2023-12-12

## 2023-12-12 NOTE — TELEPHONE ENCOUNTER
RX Joseph sent 11/21/23 5mg/0.5ml. He was to start after using 2.5mg dose for 4 weeks. Called [facility] to see if 5mg can be released. They will begin to process and have ready and send patient a notification. Patient contacted and informed.

## 2023-12-12 NOTE — TELEPHONE ENCOUNTER
Patient is asking if he has allergies to any medications. Patient is getting a bracelet that will include any allergies . Please advise.

## 2023-12-12 NOTE — TELEPHONE ENCOUNTER
Patient is following up on requested refill medication and mentions he does not have any remaining    MOUNJARO  5 MG    Patient advised to contact PCP for refill; please advise.

## 2023-12-12 NOTE — TELEPHONE ENCOUNTER
Spoke with patient,  verified. Notified in our computer, no known allergies. He can also check with his pharmacy. Patient stated understanding.

## 2023-12-13 NOTE — TELEPHONE ENCOUNTER
Please review. Protocol failed / No Protocol.    Requested Prescriptions   Pending Prescriptions Disp Refills    MOUNJARO 2.5 MG/0.5ML Subcutaneous Solution Pen-injector [Pharmacy Med Name: MOUNJARO 2.5MG/0.5ML PF PEN INJ] 2 mL 0     Sig: INJECT 2.5 MG INTO THE SKIN ONCE A WEEK FOR 4 WEEKS THEN INCREASE TO 5 MG ONCE A WEEK.       Diabetes Medication Protocol Failed - 12/11/2023  3:47 PM        Failed - EGFRCR or GFRNAA > 50     GFR Evaluation  EGFRCR: 14 , resulted on 6/17/2023          Passed - Last A1C < 7.5 and within past 6 months     Lab Results   Component Value Date    A1C 7.4 (H) 06/17/2023             Passed - In person appointment or virtual visit in the past 6 mos or appointment in next 3 mos     Recent Outpatient Visits              3 weeks ago Type 2 diabetes mellitus with diabetic nephropathy, without long-term current use of insulin ([facility]    Office Visit    1 month ago Type 2 diabetes mellitus with diabetic nephropathy, without long-term current use of insulin ([facility])    [facility] Edgar Chaney MD    Office Visit    5 months ago Medicare annual wellness visit, initial    [facility]    Office Visit    11 months ago     [facility] [place] Pat[place], PT    Office Visit    11 months ago     [facility] [place] Patel, PT    Office Visit          Future Appointments         Provider Department Appt Notes    In 3 weeks [facility]               Passed - GFR in the past 12 months

## 2023-12-16 DIAGNOSIS — E11.21 TYPE 2 DIABETES MELLITUS WITH DIABETIC NEPHROPATHY, WITHOUT LONG-TERM CURRENT USE OF INSULIN (HCC): ICD-10-CM

## 2023-12-18 RX ORDER — REPAGLINIDE 1 MG/1
1 TABLET ORAL
Qty: 270 TABLET | Refills: 0 | OUTPATIENT
Start: 2023-12-18

## 2024-01-09 DIAGNOSIS — E11.21 TYPE 2 DIABETES MELLITUS WITH DIABETIC NEPHROPATHY, WITHOUT LONG-TERM CURRENT USE OF INSULIN (HCC): ICD-10-CM

## 2024-01-09 NOTE — TELEPHONE ENCOUNTER
Pharmacy requesting refill     Repaglinide 1 MG Oral Tab Take 1 tablet (1 mg total) by mouth 3 (three) times daily before meals. 270 tablet 0

## 2024-01-11 RX ORDER — REPAGLINIDE 1 MG/1
1 TABLET ORAL
Qty: 270 TABLET | Refills: 1 | Status: SHIPPED | OUTPATIENT
Start: 2024-01-11

## 2024-01-11 NOTE — TELEPHONE ENCOUNTER
Protocol Failed/ No Protocol    Requested Prescriptions   Pending Prescriptions Disp Refills    Repaglinide 1 MG Oral Tab 270 tablet 1     Sig: Take 1 tablet (1 mg total) by mouth 3 (three) times daily before meals.       Diabetes Medication Protocol Failed - 1/9/2024  5:02 PM        Failed - Last A1C < 7.5 and within past 6 months     Lab Results   Component Value Date    A1C 7.4 (H) 06/17/2023             Failed - EGFRCR or GFRNAA > 50     GFR Evaluation  EGFRCR: 14 , resulted on 6/17/2023          Passed - In person appointment or virtual visit in the past 6 mos or appointment in next 3 mos     Recent Outpatient Visits              1 month ago Type 2 diabetes mellitus with diabetic nephropathy, without long-term current use of insulin ([facility])    [facility] [place] Ric[facility] [place]    Office Visit    2 months ago Type 2 diabetes mellitus with diabetic nephropathy, without long-term current use of insulin ([facility])    [facility] [place] [facility]    Office Visit    6 months ago Medicare annual wellness visit, initial    [facility] [place] Ann Ricardo, MD    Office Visit    1 year ago     [facility] [place], PT    Office Visit    1 year ago     [facility] [place], PT    Office Visit                      Passed - GFR in the past 12 months               Recent Outpatient Visits              1 month ago Type 2 diabetes mellitus with diabetic nephropathy, without long-term current use of insulin ([facility])    [facility] [facility]    Office Visit    2 months ago Type 2 diabetes mellitus with diabetic nephropathy, without long-term current use of insulin ([facility])    [facility] Ric[facility], [facility]    Office Visit    6 months ago Medicare annual wellness visit,  initial    [facility] [address], Edgar Chaney MD    Office Visit    1 year ago     [facility] [place], PT    Office Visit    1 year ago     [facility] [place], PT    Office Visit

## 2024-03-23 ENCOUNTER — OFFICE VISIT (OUTPATIENT)
Dept: FAMILY MEDICINE CLINIC | Facility: CLINIC | Age: 54
End: 2024-03-23

## 2024-03-23 VITALS
HEART RATE: 67 BPM | HEIGHT: 70 IN | WEIGHT: 246 LBS | SYSTOLIC BLOOD PRESSURE: 134 MMHG | BODY MASS INDEX: 35.22 KG/M2 | DIASTOLIC BLOOD PRESSURE: 62 MMHG

## 2024-03-23 DIAGNOSIS — I10 ESSENTIAL HYPERTENSION: ICD-10-CM

## 2024-03-23 DIAGNOSIS — E11.21 TYPE 2 DIABETES MELLITUS WITH DIABETIC NEPHROPATHY, WITHOUT LONG-TERM CURRENT USE OF INSULIN (HCC): Primary | ICD-10-CM

## 2024-03-23 DIAGNOSIS — Z94.0 RENAL TRANSPLANT RECIPIENT (HCC): ICD-10-CM

## 2024-03-23 LAB
CARTRIDGE LOT#: ABNORMAL NUMERIC
HEMOGLOBIN A1C: 7.8 % (ref 4.3–5.6)

## 2024-03-23 PROCEDURE — 83036 HEMOGLOBIN GLYCOSYLATED A1C: CPT | Performed by: FAMILY MEDICINE

## 2024-03-23 PROCEDURE — 3051F HG A1C>EQUAL 7.0%<8.0%: CPT | Performed by: FAMILY MEDICINE

## 2024-03-23 PROCEDURE — 3078F DIAST BP <80 MM HG: CPT | Performed by: FAMILY MEDICINE

## 2024-03-23 PROCEDURE — 3008F BODY MASS INDEX DOCD: CPT | Performed by: FAMILY MEDICINE

## 2024-03-23 PROCEDURE — 3075F SYST BP GE 130 - 139MM HG: CPT | Performed by: FAMILY MEDICINE

## 2024-03-23 PROCEDURE — 99214 OFFICE O/P EST MOD 30 MIN: CPT | Performed by: FAMILY MEDICINE

## 2024-03-23 RX ORDER — LINAGLIPTIN 5 MG/1
5 TABLET, FILM COATED ORAL DAILY
Qty: 90 TABLET | Refills: 2 | Status: SHIPPED | OUTPATIENT
Start: 2024-03-23

## 2024-03-23 RX ORDER — INSULIN DEGLUDEC 200 U/ML
34 INJECTION, SOLUTION SUBCUTANEOUS DAILY
Qty: 15 EACH | Refills: 0 | Status: SHIPPED | OUTPATIENT
Start: 2024-03-23 | End: 2024-03-23

## 2024-03-23 RX ORDER — AMLODIPINE BESYLATE 10 MG/1
10 TABLET ORAL DAILY
Qty: 90 EACH | Refills: 3 | Status: SHIPPED | OUTPATIENT
Start: 2024-03-23

## 2024-03-23 RX ORDER — REPAGLINIDE 1 MG/1
1 TABLET ORAL
Qty: 270 TABLET | Refills: 1 | Status: SHIPPED | OUTPATIENT
Start: 2024-03-23

## 2024-03-23 RX ORDER — CARVEDILOL 3.12 MG/1
3.12 TABLET ORAL 2 TIMES DAILY WITH MEALS
Qty: 180 TABLET | Refills: 1 | Status: SHIPPED | OUTPATIENT
Start: 2024-03-23

## 2024-03-23 RX ORDER — INSULIN LISPRO 200 [IU]/ML
INJECTION, SOLUTION SUBCUTANEOUS
Qty: 6 EACH | Refills: 3 | Status: SHIPPED | OUTPATIENT
Start: 2024-03-23

## 2024-03-23 RX ORDER — ROSUVASTATIN CALCIUM 20 MG/1
20 TABLET, COATED ORAL NIGHTLY
Qty: 90 TABLET | Refills: 3 | Status: SHIPPED | OUTPATIENT
Start: 2024-03-23

## 2024-03-23 RX ORDER — INSULIN DEGLUDEC 200 U/ML
30 INJECTION, SOLUTION SUBCUTANEOUS NIGHTLY
Qty: 15 EACH | Refills: 0 | Status: SHIPPED | OUTPATIENT
Start: 2024-03-23 | End: 2024-03-23

## 2024-03-23 RX ORDER — INSULIN DEGLUDEC 200 U/ML
38 INJECTION, SOLUTION SUBCUTANEOUS DAILY
Qty: 15 EACH | Refills: 0 | Status: SHIPPED | OUTPATIENT
Start: 2024-03-23

## 2024-03-23 RX ORDER — TIRZEPATIDE 5 MG/.5ML
5 INJECTION, SOLUTION SUBCUTANEOUS WEEKLY
Qty: 2 EACH | Refills: 5 | Status: SHIPPED | OUTPATIENT
Start: 2024-03-23

## 2024-03-23 NOTE — PROGRESS NOTES
3/23/2024  12:04 PM    John Avery is a 54 year old male.    Chief complaint(s):   Chief Complaint   Patient presents with    Diabetes     F/u     Follow - Up     Repaglinide 1mg , 1 tab with meal , 2 tab with meal 2 tab with meal      HPI:     John Avery primary complaint is regarding multiple complaints.     John Brandt is a 54 yrs old male who has type 2, insulin requiring diabetes. Compliance with treatment has been fair.  Patient's diabetes was first diagnosed 2015.  Patient follows a 2000 calorie ADA diet.  Patient report experiencing the following diabetes related symptoms; Negative for polyuria, Negative for polydipsia, Negative for blurred vision.  Depression symptoms include none.  Tobacco screen: none  smoker.  Recently placed on prednisone 5 mg due to renal transplant.  Current meds include :  oral hypoglycemic include: Tradjenta 5 mg, Prandin 1 mg TID/ meals  insulin/injectable : Tresiba 38 units at bedtime.   Mounjaro 5 mg Q week. Hypoglycemia severity is not applicable. He reports home blood glucose readings have been 112 (#s) and believes having fair glucose control.  Most recent lab results include glycohemoglobin 7.5%, microalbuminuria have been Cr 1.05, + GFR 84 s/p renal transplant.  In regard to preventative care, his last ophthalmology exam was in 1 months ago.  Opthalmic evaluation have shown - pathology.  Concurrent relative health problems include HTN/ CRF. Already received his COVID vaccones.     John Brandtis a 54 year old male presents for follow up regarding hypertension / CRI s/p renal transplant. S/p renal transplant 08/11/23. This was first diagnosed more than 5 years ago.  Current nonpharmacologic treatment includes low sodium diet, exercise, and meditation.  His current cardiac medication(s) regimen includes: Carvedilol 3.125, Amlodipine 10 mg.  He has not kept a blood pressure diary, but states that his blood pressures have been poorly controll.  He is tolerating  his medication(s)  well without side effects.  Patient has been placed on a waiting list for renal transplant.         HISTORY:  Past Medical History:   Diagnosis Date    Chicken pox     Dialysis patient (HCC)     mwf rt chest catheter    High blood pressure     High cholesterol     Other and unspecified hyperlipidemia     Renal disorder     Type II or unspecified type diabetes mellitus without mention of complication, not stated as uncontrolled dx'd in 2015    Unspecified essential hypertension     Visual impairment       Past Surgical History:   Procedure Laterality Date    HERNIA SURGERY  2015    repair of small umbilical hernia; excisin of umbilical pilonidal cyst    OTHER SURGICAL HISTORY  2017    belly button surgery      Family History   Problem Relation Age of Onset    Diabetes Father         type 2    Hypertension Father     Diabetes Maternal Grandmother         type 2    Prostate Cancer Maternal Grandfather     No Known Problems Sister     No Known Problems Sister       Social History:   Social History     Socioeconomic History    Marital status:     Number of children: 2   Occupational History    Occupation:    Tobacco Use    Smoking status: Former     Packs/day: .5     Types: Cigarettes     Quit date: 2014     Years since quittin.3    Smokeless tobacco: Never   Vaping Use    Vaping Use: Never used   Substance and Sexual Activity    Alcohol use: Yes     Alcohol/week: 2.0 standard drinks of alcohol     Types: 2 Shots of liquor per week     Comment: OCC    Drug use: Never   Other Topics Concern    Caffeine Concern Yes     Comment: chocolate-1 cup        Immunizations:   Immunization History   Administered Date(s) Administered    >=3 YRS FLUZONE TRI PRESERV FREE SINGLE DOSE (59516) [facility] 2020    Covid-19 Vaccine Pfizer 30 mcg/0.3 ml 2021, 2021, 2021    Covid-19 Vaccine Pfizer Bivalent 30mcg/0.3mL 2023    Covid-19 Vaccine Pfizer  Jonathon-Sucrose 30 mcg/0.3 ml 07/19/2022    FLU VAC QIV SPLIT 3 YRS AND OLDER (34639) 09/23/2020    FLULAVAL 6 months & older 0.5 ml Prefilled syringe (70899) 11/15/2019    FLUZONE 6 months and older PFS 0.5 ml (23360) 12/27/2016, 10/01/2021, 10/22/2021, 09/19/2022    Fluvirin, 3 Years & >, Im 12/01/2019    Pneumococcal (Prevnar 13) 12/19/2016    TDAP 03/20/2015    Zoster Vaccine Recombinant Adjuvanted (Shingrix) 06/17/2023   Deferred Date(s) Deferred    FLUZONE 6 months and older PFS 0.5 ml (36625) 12/19/2016       Medications (Active prior to today's visit):  Current Outpatient Medications   Medication Sig Dispense Refill    amLODIPine 10 MG Oral Tab Take 1 tablet (10 mg total) by mouth daily. 90 each 3    carvedilol 3.125 MG Oral Tab Take 1 tablet (3.125 mg total) by mouth 2 (two) times daily with meals. 180 tablet 1    Insulin Lispro (HUMALOG KWIKPEN) 200 UNIT/ML Subcutaneous Solution Pen-injector 150-185 3 units, 186-200 5 units, 201-250 7 units, 251-300 10 units, >301 12 units 6 each 3    linaGLIPtin (TRADJENTA) 5 mg Oral Tab Take 1 tablet (5 mg total) by mouth daily. 90 tablet 2    Repaglinide 1 MG Oral Tab Take 1 tablet (1 mg total) by mouth 3 (three) times daily before meals. 270 tablet 1    rosuvastatin 20 MG Oral Tab Take 1 tablet (20 mg total) by mouth nightly. 90 tablet 3    Tirzepatide (MOUNJARO) 5 MG/0.5ML Subcutaneous Solution Pen-injector Inject 5 mg into the skin once a week. After using 0.25 mg Q week for 4 week 2 each 5    insulin degludec (TRESIBA FLEXTOUCH) 200 UNIT/ML Subcutaneous Solution Pen-injector Inject 38 Units into the skin daily. 15 each 0    Insulin Pen Needle (BD PEN NEEDLE RUFUS 2ND GEN) 32G X 4 MM Does not apply Misc Take 1 each by mouth daily. 100 each 6    cinacalcet 60 MG Oral Tab Take 1 tablet (60 mg total) by mouth.      tacrolimus 1 MG Oral Cap Take by mouth 2 (two) times daily.      mycophenolate sodium 360 MG Oral Tab EC Take 2 tablets (720 mg total) by mouth 2 (two) times  daily before meals.      sulfamethoxazole-trimethoprim 400-80 MG Oral Tab Take 1 tablet by mouth 2 (two) times daily.      Continuous Blood Gluc Sensor (FREESTYLE ELKIN 14 DAY SENSOR) Does not apply Misc Use QID, Change sensor every 2 weeks 2 each 11    Insulin Pen Needle (BD PEN NEEDLE RUFUS U/F) 32G X 4 MM Does not apply Misc USE DAILY 100 each 3    valGANciclovir 450 MG Oral Tab Take 1 tablet (450 mg total) by mouth daily. (Patient not taking: Reported on 3/23/2024)         Allergies:  No Known Allergies      ROS:   Review of Systems   Constitutional:  Negative for appetite change, fatigue and fever.   Eyes:  Negative for visual disturbance.   Respiratory:  Negative for shortness of breath.    Cardiovascular:  Negative for chest pain.   Gastrointestinal:  Negative for abdominal pain.   Endocrine: Negative for polydipsia and polyuria.   Musculoskeletal:  Negative for myalgias.   Skin:  Negative for rash.   Neurological:  Negative for dizziness, weakness and headaches.       PHYSICAL EXAM:   VS: /62   Pulse 67   Ht 5' 10\" (1.778 m)   Wt 246 lb (111.6 kg)   BMI 35.30 kg/m²     Physical Exam  Vitals reviewed.   Constitutional:       Appearance: Normal appearance. He is well-developed.   HENT:      Head: Normocephalic.   Eyes:      General: No scleral icterus.     Conjunctiva/sclera: Conjunctivae normal.   Cardiovascular:      Rate and Rhythm: Normal rate.   Pulmonary:      Effort: Pulmonary effort is normal.   Musculoskeletal:      Cervical back: Neck supple.   Skin:     Findings: No rash.   Psychiatric:         Mood and Affect: Mood normal.         LABORATORY RESULTS:   No results found for: \"URCOLOR\", \"URCLA\", \"URINELEUK\", \"URINENITRITE\", \"URINEBLOOD\"   Results for orders placed or performed in visit on 03/23/24   POC Glycohemoglobin [08463]   Result Value Ref Range    HEMOGLOBIN A1C 7.8 (A) 4.3 - 5.6 %    Cartridge Lot# [id number] Numeric    Cartridge Expiration Date 11/30/2025 Date       EKG / Spirometry :  -     Radiology: No results found.     ASSESSMENT/PLAN:   Assessment   Encounter Diagnoses   Name Primary?    Type 2 diabetes mellitus with diabetic nephropathy, without long-term current use of insulin (HCC) Yes    Essential hypertension     Renal transplant recipient ([facility])        1. Type 2 diabetes mellitus with diabetic nephropathy, without long-term current use of insulin ([facility])    DIABETES A&P    LABORATORY & ORDERS: Blood test(s) ordered today ;   Orders Placed This Encounter   Procedures    POC Glycohemoglobin [90683]     Additional orders include:   MEDICATIONS:    amLODIPine 10 MG Oral Tab, Take 1 tablet (10 mg total) by mouth daily., Disp: 90 each, Rfl: 3    carvedilol 3.125 MG Oral Tab, Take 1 tablet (3.125 mg total) by mouth 2 (two) times daily with meals., Disp: 180 tablet, Rfl: 1    Insulin Lispro (HUMALOG KWIKPEN) 200 UNIT/ML Subcutaneous Solution Pen-injector, 150-185 3 units, 186-200 5 units, 201-250 7 units, 251-300 10 units, >301 12 units, Disp: 6 each, Rfl: 3    linaGLIPtin (TRADJENTA) 5 mg Oral Tab, Take 1 tablet (5 mg total) by mouth daily., Disp: 90 tablet, Rfl: 2    Repaglinide 1 MG Oral Tab, Take 1 tablet (1 mg total) by mouth 3 (three) times daily before meals., Disp: 270 tablet, Rfl: 1    rosuvastatin 20 MG Oral Tab, Take 1 tablet (20 mg total) by mouth nightly., Disp: 90 tablet, Rfl: 3    Tirzepatide (MOUNJARO) 5 MG/0.5ML Subcutaneous Solution Pen-injector, Inject 5 mg into the skin once a week. After using 0.25 mg Q week for 4 week, Disp: 2 each, Rfl: 5    insulin degludec (TRESIBA FLEXTOUCH) 200 UNIT/ML Subcutaneous Solution Pen-injector, Inject 38 Units into the skin daily., Disp: 15 each, Rfl: 0    Insulin Pen Needle (BD PEN NEEDLE RUFUS 2ND GEN) 32G X 4 MM Does not apply Misc, Take 1 each by mouth daily., Disp: 100 each, Rfl: 6    cinacalcet 60 MG Oral Tab, Take 1 tablet (60 mg total) by mouth., Disp: , Rfl:     tacrolimus 1 MG Oral Cap, Take by mouth 2 (two) times daily., Disp: , Rfl:      mycophenolate sodium 360 MG Oral Tab EC, Take 2 tablets (720 mg total) by mouth 2 (two) times daily before meals., Disp: , Rfl:     sulfamethoxazole-trimethoprim 400-80 MG Oral Tab, Take 1 tablet by mouth 2 (two) times daily., Disp: , Rfl:     Continuous Blood Gluc Sensor (FREESTYLE ELKNI 14 DAY SENSOR) Does not apply Misc, Use QID, Change sensor every 2 weeks, Disp: 2 each, Rfl: 11    Insulin Pen Needle (BD PEN NEEDLE RUFUS U/F) 32G X 4 MM Does not apply Misc, USE DAILY, Disp: 100 each, Rfl: 3    valGANciclovir 450 MG Oral Tab, Take 1 tablet (450 mg total) by mouth daily. (Patient not taking: Reported on 3/23/2024), Disp: , Rfl: .  Requested Prescriptions     Signed Prescriptions Disp Refills    amLODIPine 10 MG Oral Tab 90 each 3     Sig: Take 1 tablet (10 mg total) by mouth daily.    carvedilol 3.125 MG Oral Tab 180 tablet 1     Sig: Take 1 tablet (3.125 mg total) by mouth 2 (two) times daily with meals.    Insulin Lispro (HUMALOG KWIKPEN) 200 UNIT/ML Subcutaneous Solution Pen-injector 6 each 3     Si-185 3 units, 186-200 5 units, 201-250 7 units, 251-300 10 units, >301 12 units    linaGLIPtin (TRADJENTA) 5 mg Oral Tab 90 tablet 2     Sig: Take 1 tablet (5 mg total) by mouth daily.    Repaglinide 1 MG Oral Tab 270 tablet 1     Sig: Take 1 tablet (1 mg total) by mouth 3 (three) times daily before meals.    rosuvastatin 20 MG Oral Tab 90 tablet 3     Sig: Take 1 tablet (20 mg total) by mouth nightly.    Tirzepatide (MOUNJARO) 5 MG/0.5ML Subcutaneous Solution Pen-injector 2 each 5     Sig: Inject 5 mg into the skin once a week. After using 0.25 mg Q week for 4 week    insulin degludec (TRESIBA FLEXTOUCH) 200 UNIT/ML Subcutaneous Solution Pen-injector 15 each 0     Sig: Inject 38 Units into the skin daily.      REFERRALS:       Procedures    POC Glycohemoglobin [57500]     RECOMMENDATIONS: instructed in use of glucometer ( check fasting glucose multiple times a day), return for training in administering  insulin injections, adherence to an 1800 calorie ADA diet,  10 pound weight loss, a graduated exercise program, HgbA1C level checked quarterly, daily foot self-inspection, need for yearly flu shots, and avoid all sodas, juices, candy, chocolates, cakes, ice cream, etc.      FOLLOW-UP: Schedule a follow-up visit in 6 weeks.       COUNSELING: The patient was counseled concerning the relationship between diabetes control and macrovascular disease including cardiovascular, cerebrovascular and peripheral vascular disease. The patient was counseled concerning the relationship between diabetes control and retinopathy, nephropathy, and neuropathy. Advised as to the targets of pre-meal glucoses ( mg/dl) and post meal glucoses (<140-160 mg/dl) Home glucose testing discussed. The A1c target of <7% according to ADA and <6.5% according to AACE were discussed.             2. Essential hypertension  3. Renal transplant recipient (HCC)    MEDICATIONS:   Requested Prescriptions     Signed Prescriptions Disp Refills    amLODIPine 10 MG Oral Tab 90 each 3     Sig: Take 1 tablet (10 mg total) by mouth daily.    carvedilol 3.125 MG Oral Tab 180 tablet 1     Sig: Take 1 tablet (3.125 mg total) by mouth 2 (two) times daily with meals.    Insulin Lispro (HUMALOG KWIKPEN) 200 UNIT/ML Subcutaneous Solution Pen-injector 6 each 3     Si-185 3 units, 186-200 5 units, 201-250 7 units, 251-300 10 units, >301 12 units    linaGLIPtin (TRADJENTA) 5 mg Oral Tab 90 tablet 2     Sig: Take 1 tablet (5 mg total) by mouth daily.    Repaglinide 1 MG Oral Tab 270 tablet 1     Sig: Take 1 tablet (1 mg total) by mouth 3 (three) times daily before meals.    rosuvastatin 20 MG Oral Tab 90 tablet 3     Sig: Take 1 tablet (20 mg total) by mouth nightly.    Tirzepatide (MOUNJARO) 5 MG/0.5ML Subcutaneous Solution Pen-injector 2 each 5     Sig: Inject 5 mg into the skin once a week. After using 0.25 mg Q week for 4 week    insulin degludec (TRESIBA  FLEXTOUCH) 200 UNIT/ML Subcutaneous Solution Pen-injector 15 each 0     Sig: Inject 38 Units into the skin daily.    Follow up with Renal transplant team  RECOMMENDATIONS given include: avoid pseudoephedrine or other stimulants/decongestants in common cold remedies, decrease consumption of alcohol, perform routine monitoring of blood pressure with home blood pressure cuff, exercise, reduction of dietary salt intake, take medication as prescribed, try not to miss doses, smoking cessation, weight loss, and stress reduction.    FOLLOW-UP: Schedule a follow-up visit in 6 months.               Orders This Visit:  Orders Placed This Encounter   Procedures    POC Glycohemoglobin [[id number]]       Meds This Visit:  Requested Prescriptions     Signed Prescriptions Disp Refills    amLODIPine 10 MG Oral Tab 90 each 3     Sig: Take 1 tablet (10 mg total) by mouth daily.    carvedilol 3.125 MG Oral Tab 180 tablet 1     Sig: Take 1 tablet (3.125 mg total) by mouth 2 (two) times daily with meals.    Insulin Lispro (HUMALOG KWIKPEN) 200 UNIT/ML Subcutaneous Solution Pen-injector 6 each 3     Si-185 3 units, 186-200 5 units, 201-250 7 units, 251-300 10 units, >301 12 units    linaGLIPtin (TRADJENTA) 5 mg Oral Tab 90 tablet 2     Sig: Take 1 tablet (5 mg total) by mouth daily.    Repaglinide 1 MG Oral Tab 270 tablet 1     Sig: Take 1 tablet (1 mg total) by mouth 3 (three) times daily before meals.    rosuvastatin 20 MG Oral Tab 90 tablet 3     Sig: Take 1 tablet (20 mg total) by mouth nightly.    Tirzepatide (MOUNJARO) 5 MG/0.5ML Subcutaneous Solution Pen-injector 2 each 5     Sig: Inject 5 mg into the skin once a week. After using 0.25 mg Q week for 4 week    insulin degludec (TRESIBA FLEXTOUCH) 200 UNIT/ML Subcutaneous Solution Pen-injector 15 each 0     Sig: Inject 38 Units into the skin daily.       Imaging & Referrals:  None         SCHUYLER TAM MD

## 2024-03-27 ENCOUNTER — TELEPHONE (OUTPATIENT)
Dept: FAMILY MEDICINE CLINIC | Facility: CLINIC | Age: 54
End: 2024-03-27

## 2024-03-27 NOTE — TELEPHONE ENCOUNTER
Pharmacist Sierra Requesting New RX's bridger 2 rx's sent - to clear up confusion    1) insulin degludec (TRESIBA FLEXTOUCH) 200 UNIT/ML - received 3 rx's with different dosages       2)Insulin Lispro (HUMALOG KWIKPEN) 200 UNIT/ML - need Frequency and the total daily dosing

## 2024-03-28 DIAGNOSIS — E11.21 TYPE 2 DIABETES MELLITUS WITH DIABETIC NEPHROPATHY, WITHOUT LONG-TERM CURRENT USE OF INSULIN (HCC): ICD-10-CM

## 2024-03-28 RX ORDER — INSULIN LISPRO 200 [IU]/ML
INJECTION, SOLUTION SUBCUTANEOUS
Qty: 6 EACH | Refills: 3 | Status: SHIPPED | OUTPATIENT
Start: 2024-03-28

## 2024-03-28 RX ORDER — INSULIN LISPRO 200 [IU]/ML
INJECTION, SOLUTION SUBCUTANEOUS
Qty: 6 EACH | Refills: 3 | Status: SHIPPED | OUTPATIENT
Start: 2024-03-28 | End: 2024-03-28

## 2024-03-28 RX ORDER — INSULIN DEGLUDEC 200 U/ML
38 INJECTION, SOLUTION SUBCUTANEOUS DAILY
Qty: 15 EACH | Refills: 0 | Status: SHIPPED | OUTPATIENT
Start: 2024-03-28

## 2024-03-28 NOTE — TELEPHONE ENCOUNTER
Spoke with Eloy Eng, verified patient's Name and . New prescription sent for Insulin Lispro only includes quantity, refill and sliding scale dosing, but is missing frequency.     Dr. Landis kindly send new prescription with frequency. Thank you.

## 2024-04-29 RX ORDER — FLASH GLUCOSE SENSOR
KIT MISCELLANEOUS
Qty: 2 EACH | Refills: 3 | Status: SHIPPED | OUTPATIENT
Start: 2024-04-29

## 2024-04-29 NOTE — TELEPHONE ENCOUNTER
Refill passed per [facility] protocol.  Requested Prescriptions   Pending Prescriptions Disp Refills    FREESTYLE ELKIN 14 DAY SENSOR Does not apply Misc [Pharmacy Med Name: FREESTYLE ELKIN 14DAY SENSOR] 2 each 11     Sig: USE FOUR TIMES DAILY QAND CHANGE EVERY 14 DAYS       Diabetic Supplies Protocol Passed - 4/26/2024  1:38 PM        Passed - In person appointment or virtual visit in the past 12 mos or appointment in next 3 mos     Recent Outpatient Visits              1 month ago Type 2 diabetes mellitus with diabetic nephropathy, without long-term current use of insulin ([facility]    Office Visit    5 months ago Type 2 diabetes mellitus with diabetic nephropathy, without long-term current use of insulin ([facility]    Office Visit    6 months ago Type 2 diabetes mellitus with diabetic nephropathy, without long-term current use of insulin ([facility])    [facility]    Office Visit    10 months ago Medicare annual wellness visit, initial    [facility]    Office Visit    1 year ago     [facility] [place] Tal[place] Patel, PT    Office Visit          Future Appointments         Provider Department Appt Notes    In 5 months [facility]                       Recent Outpatient Visits              1 month ago Type 2 diabetes mellitus with diabetic nephropathy, without long-term current use of insulin ([facility])    [facility]    Office Visit    5 months ago Type 2 diabetes mellitus with diabetic nephropathy, without long-term current use of insulin ([facility])    [facility] [address]  MD Edgar    Office Visit    6 months ago Type 2 diabetes mellitus with diabetic nephropathy, without long-term current use of insulin (HCC)    [facility]    Office Visit    10 months ago Medicare annual wellness visit, [facility]    [facility]    1 year ago     [facility] [place] Talon Patel, PT    Office Visit          Future Appointments         Provider Department Appt Notes    In 5 months [facility] [address] [place] Follow up

## 2024-05-03 ENCOUNTER — TELEPHONE (OUTPATIENT)
Dept: FAMILY MEDICINE CLINIC | Facility: CLINIC | Age: 54
End: 2024-05-03

## 2024-05-03 NOTE — TELEPHONE ENCOUNTER
carvedilol 3.125 MG Oral Tab 180 tablet 1 3/23/2024 --    Sig - Route: Take 1 tablet (3.125 mg total) by mouth 2 (two) times daily with meals. - Oral    Sent to pharmacy as: Carvedilol 3.125 MG Oral Tablet (Coreg)    E-Prescribing Status: Receipt confirmed by pharmacy (3/23/2024 12:14 PM CDT)      Pharmacy    [id number] - [place], [place] - [address] AT [address], [phone], [phone]   Duplicate request, previously addressed.

## 2024-05-03 NOTE — TELEPHONE ENCOUNTER
Pharmacy requesting refill of   Current Outpatient Medications:       carvedilol 3.125 MG Oral Tab, Take 1 tablet (3.125 mg total) by mouth 2 (two) times daily with meals., Disp: 180 tablet, Rfl: 1

## 2024-05-09 ENCOUNTER — TELEPHONE (OUTPATIENT)
Dept: FAMILY MEDICINE CLINIC | Facility: CLINIC | Age: 54
End: 2024-05-09

## 2024-05-09 NOTE — TELEPHONE ENCOUNTER
[I updated medication list: discontinued Tradjenta per Dr. Landis, below]    I called Luis Felipe with [employer] --> Left message to call back [1st attempt]    Left message to call back for patient (to ensure he discontinues Tradjenta as advised by Dr. Landis); patient is not MyChart active [1st attempt]

## 2024-05-09 NOTE — TELEPHONE ENCOUNTER
Luis Felipe from [employer] wanted to confirm if patient is suppose to be taking Rx tradjenta and Rx mounjaro together? Please advise           Current Outpatient Medications   Medication Sig Dispense Refill    linaGLIPtin (TRADJENTA) 5 mg Oral Tab Take 1 tablet (5 mg total) by mouth daily. 90 tablet 2    Tirzepatide (MOUNJARO) 5 MG/0.5ML Subcutaneous Solution Pen-injector Inject 5 mg into the skin once a week. After using 0.25 mg Q week for 4 week 2 each 5

## 2024-05-10 NOTE — TELEPHONE ENCOUNTER
Luis Felipe  at [facility] returning call to nurse (Tel# [phone]).  Reviewed Dr KRISTIN Landis orders below from 5/9/24 and verbalized understanding. He will reach out to pt in 1 to 2 weeks to make sure pt is compliant with MD orders.    I called pt and reviewed Dr KRISTIN Landis orders to stop tradjenta and continue with all other medications. Also, informed pt to keep 5/14/24 2pm appt with MD. Pt verbalized understanding and agreed with plan.    Please reply to pool: EM RN TRIAGE

## 2024-06-11 DIAGNOSIS — E11.21 TYPE 2 DIABETES MELLITUS WITH DIABETIC NEPHROPATHY, WITHOUT LONG-TERM CURRENT USE OF INSULIN (HCC): ICD-10-CM

## 2024-06-14 RX ORDER — PEN NEEDLE, DIABETIC 32GX 5/32"
1 NEEDLE, DISPOSABLE MISCELLANEOUS 4 TIMES DAILY
Qty: 400 EACH | Refills: 3 | Status: SHIPPED | OUTPATIENT
Start: 2024-06-14

## 2024-06-14 NOTE — TELEPHONE ENCOUNTER
Please review.  Protocol failed / Has no protocol.     Requested Prescriptions   Pending Prescriptions Disp Refills    insulin degludec (TRESIBA FLEXTOUCH) 200 UNIT/ML Subcutaneous Solution Pen-injector [Pharmacy Med Name: TRESIBA FLEXTOUCH PEN(U-200)INJ 3ML] 21 mL 3     Sig: Inject 38 Units into the skin daily.       Diabetes Medication Protocol Failed - 6/14/2024  6:50 PM        Failed - Last A1C < 7.5 and within past 6 months     Lab Results   Component Value Date    A1C 7.8 (A) 03/23/2024             Failed - EGFRCR or GFRNAA > 50     GFR Evaluation  EGFRCR: 14 , resulted on 6/17/2023          Passed - In person appointment or virtual visit in the past 6 mos or appointment in next 3 mos     Recent Outpatient Visits              2 months ago Type 2 diabetes mellitus with diabetic nephropathy, without long-term current use of insulin ([facility])    [facility] [place], [place]    Office Visit    6 months ago Type 2 diabetes mellitus with diabetic nephropathy, without long-term current use of insulin ([facility])    [facility]    Office Visit    7 months ago Type 2 diabetes mellitus with diabetic nephropathy, without long-term current use of insulin ([facility])    [facility] [address], Edgar Chaney MD    Office Visit    12 months ago Medicare annual wellness visit, initial    [facility]    Office Visit    1 year ago     [place]  [facility] [place], [place]IC[place]    Office Visit          Future Appointments         Provider Department Appt Notes    In 4 weeks [facility] [address] [place] [facility]    In 3 months [facility] [address] [place] [facility]                    Passed - Microalbumin procedure in past 12 months or taking  ACE/ARB        Passed - GFR in the past 12 months          Insulin Pen Needle (BD PEN NEEDLE RUFUS U/F) 32G X 4 MM Does not apply Misc 400 each 3     Sig: Inject 1 Needle into the skin 4 (four) times daily. For use with insulins       Diabetic Supplies Protocol Passed - 6/14/2024  6:50 PM        Passed - In person appointment or virtual visit in the past 12 mos or appointment in next 3 mos     Recent Outpatient Visits              2 months ago Type 2 diabetes mellitus with diabetic nephropathy, without long-term current use of insulin ([facility])    [facility] [place]    Office Visit    6 months ago Type 2 diabetes mellitus with diabetic nephropathy, without long-term current use of insulin ([facility])    [facility] [place], MD    Office Visit    7 months ago Type 2 diabetes mellitus with diabetic nephropathy, without long-term current use of insulin ([facility])    [facility] [place] [address]    Office Visit    12 months ago Medicare annual wellness visit, initial    [facility] [address], Edgar Chaney MD    Office Visit    1 year ago     [place]  [facility] [place] Patel, PT    Office Visit          Future Appointments         Provider Department Appt Notes    In 4 weeks Edgar Landis MD [facility] Follow up    In 3 months Edgar Landis MD [facility] Follow up                       Future Appointments         Provider Department Appt Notes    In 4 weeks Edgar Landis MD [facility] Follow up    In 3 months [facility]          Recent Outpatient Visits              2 months ago Type 2 diabetes mellitus with diabetic nephropathy,  without long-term current use of insulin (HCC)    [facility]    Office Visit    6 months ago Type 2 diabetes mellitus with diabetic nephropathy, without long-term current use of insulin (HCC)    [facility]    Office Visit    7 months ago Type 2 diabetes mellitus with diabetic nephropathy, without long-term current use of insulin (HCC)    [facility]    Office Visit    12 months ago Medicare annual wellness visit, initial    [place] Addison Martinez, Ricardo, MD    Office Visit    1 year ago     [place]  [facility] [place]    Office Visit

## 2024-06-15 RX ORDER — INSULIN DEGLUDEC 200 U/ML
38 INJECTION, SOLUTION SUBCUTANEOUS DAILY
Qty: 21 ML | Refills: 3 | Status: SHIPPED | OUTPATIENT
Start: 2024-06-15

## 2024-07-27 ENCOUNTER — OFFICE VISIT (OUTPATIENT)
Dept: FAMILY MEDICINE CLINIC | Facility: CLINIC | Age: 54
End: 2024-07-27

## 2024-07-27 ENCOUNTER — LAB ENCOUNTER (OUTPATIENT)
Dept: LAB | Age: 54
End: 2024-07-27
Attending: FAMILY MEDICINE
Payer: MEDICARE

## 2024-07-27 VITALS
WEIGHT: 251.19 LBS | DIASTOLIC BLOOD PRESSURE: 60 MMHG | SYSTOLIC BLOOD PRESSURE: 124 MMHG | HEIGHT: 70 IN | BODY MASS INDEX: 35.96 KG/M2 | HEART RATE: 60 BPM

## 2024-07-27 DIAGNOSIS — R35.1 NOCTURIA: ICD-10-CM

## 2024-07-27 DIAGNOSIS — Z94.0 RENAL TRANSPLANT RECIPIENT (HCC): ICD-10-CM

## 2024-07-27 DIAGNOSIS — Z12.11 COLON CANCER SCREENING: ICD-10-CM

## 2024-07-27 DIAGNOSIS — E55.9 VITAMIN D DEFICIENCY: ICD-10-CM

## 2024-07-27 DIAGNOSIS — E66.01 CLASS 2 SEVERE OBESITY DUE TO EXCESS CALORIES WITH SERIOUS COMORBIDITY AND BODY MASS INDEX (BMI) OF 35.0 TO 35.9 IN ADULT (HCC): ICD-10-CM

## 2024-07-27 DIAGNOSIS — E11.21 TYPE 2 DIABETES MELLITUS WITH DIABETIC NEPHROPATHY, WITHOUT LONG-TERM CURRENT USE OF INSULIN (HCC): ICD-10-CM

## 2024-07-27 DIAGNOSIS — I10 ESSENTIAL HYPERTENSION: ICD-10-CM

## 2024-07-27 DIAGNOSIS — Z91.81 RISK FOR FALLS: ICD-10-CM

## 2024-07-27 DIAGNOSIS — E78.2 MIXED HYPERLIPIDEMIA: ICD-10-CM

## 2024-07-27 DIAGNOSIS — Z00.00 MEDICARE ANNUAL WELLNESS VISIT, INITIAL: Primary | ICD-10-CM

## 2024-07-27 LAB
ALBUMIN SERPL-MCNC: 4.8 G/DL (ref 3.2–4.8)
ALBUMIN/GLOB SERPL: 2 {RATIO} (ref 1–2)
ALP LIVER SERPL-CCNC: 232 U/L
ALT SERPL-CCNC: 54 U/L
ANION GAP SERPL CALC-SCNC: 7 MMOL/L (ref 0–18)
AST SERPL-CCNC: 34 U/L (ref ?–34)
BASOPHILS # BLD AUTO: 0.07 X10(3) UL (ref 0–0.2)
BASOPHILS NFR BLD AUTO: 1.1 %
BILIRUB SERPL-MCNC: 0.8 MG/DL (ref 0.3–1.2)
BILIRUB UR QL: NEGATIVE
BUN BLD-MCNC: 27 MG/DL (ref 9–23)
BUN/CREAT SERPL: 26.5 (ref 10–20)
CALCIUM BLD-MCNC: 9.6 MG/DL (ref 8.7–10.4)
CHLORIDE SERPL-SCNC: 113 MMOL/L (ref 98–112)
CHOLEST SERPL-MCNC: 128 MG/DL (ref ?–200)
CLARITY UR: CLEAR
CO2 SERPL-SCNC: 25 MMOL/L (ref 21–32)
CREAT BLD-MCNC: 1.02 MG/DL
CREAT UR-SCNC: 59.2 MG/DL
DEPRECATED RDW RBC AUTO: 40.7 FL (ref 35.1–46.3)
EGFRCR SERPLBLD CKD-EPI 2021: 87 ML/MIN/1.73M2 (ref 60–?)
EOSINOPHIL # BLD AUTO: 0.18 X10(3) UL (ref 0–0.7)
EOSINOPHIL NFR BLD AUTO: 2.9 %
ERYTHROCYTE [DISTWIDTH] IN BLOOD BY AUTOMATED COUNT: 12.1 % (ref 11–15)
EST. AVERAGE GLUCOSE BLD GHB EST-MCNC: 174 MG/DL (ref 68–126)
FASTING PATIENT LIPID ANSWER: YES
FASTING STATUS PATIENT QL REPORTED: YES
GLOBULIN PLAS-MCNC: 2.4 G/DL (ref 2–3.5)
GLUCOSE BLD-MCNC: 109 MG/DL (ref 70–99)
GLUCOSE UR-MCNC: NORMAL MG/DL
HBA1C MFR BLD: 7.7 % (ref ?–5.7)
HCT VFR BLD AUTO: 41.5 %
HDLC SERPL-MCNC: 39 MG/DL (ref 40–59)
HGB BLD-MCNC: 14.1 G/DL
HGB UR QL STRIP.AUTO: NEGATIVE
IMM GRANULOCYTES # BLD AUTO: 0.02 X10(3) UL (ref 0–1)
IMM GRANULOCYTES NFR BLD: 0.3 %
KETONES UR-MCNC: NEGATIVE MG/DL
LDLC SERPL CALC-MCNC: 68 MG/DL (ref ?–100)
LEUKOCYTE ESTERASE UR QL STRIP.AUTO: NEGATIVE
LYMPHOCYTES # BLD AUTO: 1.23 X10(3) UL (ref 1–4)
LYMPHOCYTES NFR BLD AUTO: 19.5 %
MCH RBC QN AUTO: 31.1 PG (ref 26–34)
MCHC RBC AUTO-ENTMCNC: 34 G/DL (ref 31–37)
MCV RBC AUTO: 91.4 FL
MICROALBUMIN UR-MCNC: 1.4 MG/DL
MICROALBUMIN/CREAT 24H UR-RTO: 23.6 UG/MG (ref ?–30)
MONOCYTES # BLD AUTO: 0.58 X10(3) UL (ref 0.1–1)
MONOCYTES NFR BLD AUTO: 9.2 %
NEUTROPHILS # BLD AUTO: 4.22 X10 (3) UL (ref 1.5–7.7)
NEUTROPHILS # BLD AUTO: 4.22 X10(3) UL (ref 1.5–7.7)
NEUTROPHILS NFR BLD AUTO: 67 %
NITRITE UR QL STRIP.AUTO: NEGATIVE
NONHDLC SERPL-MCNC: 89 MG/DL (ref ?–130)
OSMOLALITY SERPL CALC.SUM OF ELEC: 306 MOSM/KG (ref 275–295)
PH UR: 5.5 [PH] (ref 5–8)
PLATELET # BLD AUTO: 236 10(3)UL (ref 150–450)
POTASSIUM SERPL-SCNC: 4.3 MMOL/L (ref 3.5–5.1)
PROT SERPL-MCNC: 7.2 G/DL (ref 5.7–8.2)
PROT UR-MCNC: NEGATIVE MG/DL
RBC # BLD AUTO: 4.54 X10(6)UL
SODIUM SERPL-SCNC: 145 MMOL/L (ref 136–145)
SP GR UR STRIP: 1.02 (ref 1–1.03)
TRIGL SERPL-MCNC: 116 MG/DL (ref 30–149)
UROBILINOGEN UR STRIP-ACNC: NORMAL
VIT D+METAB SERPL-MCNC: 21.2 NG/ML (ref 30–100)
VLDLC SERPL CALC-MCNC: 18 MG/DL (ref 0–30)
WBC # BLD AUTO: 6.3 X10(3) UL (ref 4–11)

## 2024-07-27 PROCEDURE — 82570 ASSAY OF URINE CREATININE: CPT

## 2024-07-27 PROCEDURE — 82043 UR ALBUMIN QUANTITATIVE: CPT

## 2024-07-27 PROCEDURE — 36415 COLL VENOUS BLD VENIPUNCTURE: CPT

## 2024-07-27 PROCEDURE — 84153 ASSAY OF PSA TOTAL: CPT | Performed by: FAMILY MEDICINE

## 2024-07-27 PROCEDURE — 83036 HEMOGLOBIN GLYCOSYLATED A1C: CPT

## 2024-07-27 PROCEDURE — 85025 COMPLETE CBC W/AUTO DIFF WBC: CPT

## 2024-07-27 PROCEDURE — 84154 ASSAY OF PSA FREE: CPT | Performed by: FAMILY MEDICINE

## 2024-07-27 PROCEDURE — 80053 COMPREHEN METABOLIC PANEL: CPT

## 2024-07-27 PROCEDURE — 81003 URINALYSIS AUTO W/O SCOPE: CPT

## 2024-07-27 PROCEDURE — 82306 VITAMIN D 25 HYDROXY: CPT

## 2024-07-27 PROCEDURE — 80061 LIPID PANEL: CPT

## 2024-07-27 RX ORDER — BLOOD PRESSURE TEST KIT
KIT MISCELLANEOUS
Qty: 1 KIT | Refills: 0 | Status: SHIPPED | OUTPATIENT
Start: 2024-07-27

## 2024-07-27 RX ORDER — TIRZEPATIDE 7.5 MG/.5ML
7.5 INJECTION, SOLUTION SUBCUTANEOUS WEEKLY
Qty: 2 ML | Refills: 2 | Status: SHIPPED | OUTPATIENT
Start: 2024-07-27 | End: 2024-08-18

## 2024-07-27 NOTE — PROGRESS NOTES
Subjective:   John Avery is a 54 year old male who presents for a MA AHA (Medicare Advantage Annual Health Assessment) and Subsequent Annual Wellness visit (Pt already had Initial Annual Wellness) and scheduled follow up of multiple significant but stable problems.     John Avery is a 54 year old male s/p renal transplant who  is here for routine periodic Medicare health screening and examination.  His last physical exam was 1 year ago.  His last ECG was 1 years ago and was normal. His last diabetes screening test was 1 years ago and was abnormal: Hx diabetes.   His last cholesterol test was 1 year ago and was Hx hyperlipidemia.  Last dentist visit was 1months ago.  He is current with his Td immunization, 2015.  He is current with his Flu vaccination.  Patient last colonoscopy was 3 years ago. He is not a smoker.     History/Other:   Fall Risk Assessment:   He has been screened for Falls and is low risk.      Cognitive Assessment:   Abnormal  What day of the week is this?: Correct  What month is it?: Correct  What year is it?: Correct  Recall \"Ball\": Incorrect  Recall \"Flag\": Correct  Recall \"Tree\": Correct    Functional Ability/Status:   John Avery has some abnormal functions as listed below:  He has Vision problems based on screening of functional status.       Depression Screening (PHQ):  PHQ-2 SCORE: 0  , done 7/27/2024             Advanced Directives:   He does have a Living Will but we do NOT have it on file in Epic.    He does NOT have a Power of  for Health Care. [Do you have a healthcare power of ?: No]  Discussed Advance Care Planning with patient (and family/surrogate if present). Standard forms made available to patient in After Visit Summary.      Patient Active Problem List   Diagnosis    CKD stage 3 secondary to diabetes (HCC)    Essential hypertension    Chronic renal disease, stage 5, glomerular filtration rate less than or equal to 15 mL/min/1.73 square meter  ([facility])    Class 2 severe obesity due to excess calories with serious comorbidity and body mass index (BMI) of 35.0 to 35.9 in adult ([facility])    Parkinson's disease ([facility])    Arteriovenous fistula ([facility])    Smokers' cough ([facility])     Allergies:  He has No Known Allergies.    Current Medications:  Outpatient Medications Marked as Taking for the 7/27/24 encounter (Office Visit) with Edgar Landis MD   Medication Sig    Tirzepatide (MOUNJARO) 7.5 MG/0.5ML Subcutaneous Solution Pen-injector Inject 7.5 mg into the skin once a week for 4 doses.    Blood Pressure Does not apply Kit Use q day    Zoster Vac Recomb Adjuvanted 50 MCG/0.5ML Intramuscular Recon Susp Inject 50 mcg into the muscle once for 1 dose. Please administer vaccine at pharmacy       Medical History:  He  has a past medical history of Chicken pox, Dialysis patient ([facility]), High blood pressure, High cholesterol, Other and unspecified hyperlipidemia, Renal disorder, Type II or unspecified type diabetes mellitus without mention of complication, not stated as uncontrolled (dx'd in March 2015), Unspecified essential hypertension, and Visual impairment.  Surgical History:  He  has a past surgical history that includes hernia surgery (05/12/2015) and other surgical history (05/2017).   Family History:  His family history includes Diabetes in his father and maternal grandmother; Hypertension in his father; No Known Problems in his sister and sister; Prostate Cancer in his maternal grandfather.  Social History:  He  reports that he quit smoking about 9 years ago. His smoking use included cigarettes. He has never used smokeless tobacco. He reports current alcohol use of about 2.0 standard drinks of alcohol per week. He reports that he does not use drugs.    Tobacco:  He smoked tobacco in the past but quit greater than 12 months ago.  Social History     Tobacco Use   Smoking Status Former    Current packs/day: 0.00    Types: Cigarettes    Quit date: 11/1/2014    Years since  quittin.7   Smokeless Tobacco Never        CAGE Alcohol Screen:   CAGE screening score of 0 on 2024, showing low risk of alcohol abuse.      Patient Care Team:  Edgar Landis MD as PCP - General (Family Practice)  Bautista Bo MD (NEUROLOGY)    Review of Systems   Constitutional:  Negative for appetite change, fatigue and fever.   HENT:  Negative for hearing loss and nosebleeds.    Eyes:  Negative for pain and visual disturbance.   Respiratory:  Negative for apnea and shortness of breath.    Cardiovascular:  Negative for chest pain, palpitations and leg swelling.   Gastrointestinal:  Negative for abdominal pain, blood in stool, constipation, diarrhea, nausea and vomiting.   Endocrine: Negative for polydipsia and polyuria.   Genitourinary:  Negative for decreased urine volume, frequency and hematuria.        No nocturia   Musculoskeletal:  Negative for arthralgias.   Skin:  Negative for rash.   Neurological:  Negative for dizziness, syncope and headaches.   Psychiatric/Behavioral:  Negative for dysphoric mood and sleep disturbance.           Objective:   Physical Exam  Vitals reviewed.   Constitutional:       Appearance: Normal appearance.      Comments:      HENT:      Head: Normocephalic.      Right Ear: Hearing, tympanic membrane and ear canal normal.      Left Ear: Hearing, tympanic membrane and ear canal normal.      Nose: Nose normal. No rhinorrhea.      Mouth/Throat:      Mouth: Mucous membranes are moist.      Pharynx: Oropharynx is clear.   Eyes:      Extraocular Movements: Extraocular movements intact.      Conjunctiva/sclera: Conjunctivae normal.      Pupils: Pupils are equal, round, and reactive to light.   Neck:      Thyroid: No thyroid mass.      Vascular: No carotid bruit.   Cardiovascular:      Rate and Rhythm: Normal rate and regular rhythm.      Heart sounds: Normal heart sounds, S1 normal and S2 normal. No murmur heard.  Pulmonary:      Effort: Pulmonary effort is normal.       Breath sounds: Normal breath sounds.   Abdominal:      General: Abdomen is flat. Bowel sounds are normal.      Palpations: Abdomen is soft. There is no hepatomegaly, splenomegaly or mass.      Tenderness: There is no abdominal tenderness.      Hernia: No hernia is present.   Musculoskeletal:      Cervical back: Neck supple.      Comments: Spinal exam without scoliosis.      Feet:      Right foot:      Protective Sensation: 10 sites tested.  10 sites sensed.      Left foot:      Protective Sensation: 10 sites tested.  10 sites sensed.   Lymphadenopathy:      Cervical: No cervical adenopathy.   Skin:     General: Skin is warm.      Findings: No rash.   Neurological:      General: No focal deficit present.      Mental Status: He is alert.      Deep Tendon Reflexes:      Reflex Scores:       Patellar reflexes are 2+ on the right side and 2+ on the left side.  Psychiatric:         Attention and Perception: Attention normal.         Mood and Affect: Mood and affect normal.          /60   Pulse 60   Ht 5' 10\" (1.778 m)   Wt 251 lb 3.2 oz (113.9 kg)   BMI 36.04 kg/m²  Estimated body mass index is 36.04 kg/m² as calculated from the following:    Height as of this encounter: 5' 10\" (1.778 m).    Weight as of this encounter: 251 lb 3.2 oz (113.9 kg).    Medicare Hearing Assessment:   Hearing Screening    Screening Method: Questionnaire  I have a problem hearing over the telephone: No I have trouble following the conversations when two or more people are talking at the same time: No   I have trouble understanding things on the TV: No I have to strain to understand conversations: No   I have to worry about missing the telephone ring or doorbell: No I have trouble hearing conversations in a noisy background such as a crowded room or restaurant: No   I get confused about where sounds come from: No I misunderstand some words in a sentence and need to ask people to repeat themselves: No   I especially have trouble  understanding the speech of women and children: No I have trouble understanding the speaker in a large room such as at a meeting or place of [place]: No   Many people I talk to seem to mumble (or don't speak clearly): No People get annoyed because I misunderstand what they say: No   I misunderstand what others are saying and make inappropriate responses: No I avoid social activities because I cannot hear well and fear I will reply improperly: No   Family members and friends have told me they think I may have hearing loss: No             Visual Acuity:   Right Eye Visual Acuity: Uncorrected Right Eye Chart Acuity: 20/25   Left Eye Visual Acuity: Uncorrected Left Eye Chart Acuity: 20/40   Both Eyes Visual Acuity: Uncorrected Both Eyes Chart Acuity: 20/20   Able To Tolerate Visual Acuity: Yes        Assessment & Plan:   John Avery is a 54 year old male who presents for a Medicare Assessment.     1. Medicare annual wellness visit, initial (Primary)  2. Type 2 diabetes mellitus with diabetic nephropathy, without long-term current use of insulin ([facility])  -     Hemoglobin A1C; Future; Expected date: 07/27/2024  -     Lipid Panel; Future; Expected date: 07/27/2024  -     Microalb/Creat Ratio, Random Urine; Future; Expected date: 07/27/2024  -     OPHTHALMOLOGY - EXTERNAL  3. Essential hypertension  -     CBC With Differential With Platelet; Future; Expected date: 07/27/2024  -     Comp Metabolic Panel (14); Future; Expected date: 07/27/2024  4. Renal transplant recipient ([facility])  -     Urinalysis with Culture Reflex; Future; Expected date: 07/27/2024  5. Class 2 severe obesity due to excess calories with serious comorbidity and body mass index (BMI) of 35.0 to 35.9 in adult ([facility])  6. Mixed hyperlipidemia  7. Nocturia  -     PSA, Total W Reflex To Free  8. Colon cancer screening  9. Vitamin D deficiency  -     Vitamin D; Future; Expected date: 07/27/2024  10. Risk for falls  Other orders  -     Mounjaro; Inject 7.5 mg  into the skin once a week for 4 doses.  Dispense: 2 mL; Refill: 2  -     Blood Pressure; Use q day  Dispense: 1 kit; Refill: 0  -     Zoster Vac Recomb Adjuvanted; Inject 50 mcg into the muscle once for 1 dose. Please administer vaccine at pharmacy  Dispense: 1 each; Refill: 1    1. Medicare annual wellness visit, initial    CPE PLAN:    LABS / TEST & ORDERS for today's visit :Blood test(s) ordered today for send out to [facility] lab:  Orders Placed This Encounter   Procedures    CBC With Differential With Platelet    Comp Metabolic Panel (14)    Hemoglobin A1C    Lipid Panel    PSA, Total W Reflex To Free    Urinalysis with Culture Reflex    Vitamin D    Microalb/Creat Ratio, Random Urine   Referrals: OPHTHALMOLOGY - EXTERNAL  In-House; Urine dip.  Test/Procedures done today include: EKG.    IMMUNIZATIONS: none given today.    RECOMMENDATIONS given include: ANTICIPATORY GUIDANCE  topics covered today include: safety (i.e. seat belts, helmets, sunscreen, protective sports gear ), nutrition (i.e. healthy meals and snacks (i.e. avoid junk food and high-carbohydrate foods); athletic conditioning, fluids; low fat milk, limit to less than 20 oz. a day; dental care ), and Healthy habits& Social competence & Responsibilities: Recommendations on physical activity; exercise daily or at least 3 times a week for 30-60 minutes doing cardiovascular exercise. Encourage to maintain the best physical and dental hygiene possible.  Consider a  if overweight and/or having difficult in staying active; attempt to keep a schedule that includes an adequate sleep and physical exercise / activities Patient educated on doing regular self testicular exam. Patient was educated on sexual transmitted disease. Best to abstain from sexual intercourse until he is ready to form a family. Use of condoms may prevent transmission of infections as well as pregnancy.    FOLLOW-UP: Schedule a follow-up visit in 12 months.     2.  Type 2 diabetes mellitus with diabetic nephropathy, without long-term current use of insulin ([facility])  Stable  CPM  Follow up [facility]  Lab:   - Hemoglobin A1C; Future  - Lipid Panel; Future  - Microalb/Creat Ratio, Random Urine; Future  - OPHTHALMOLOGY - EXTERNAL    3. Essential hypertension  Stable  CPM  Follow up [facility]  Lab:   - CBC With Differential With Platelet; Future  - Comp Metabolic Panel (14); Future    4. Renal transplant recipient (HCC)  Stable  CPM  Follow up [facility]  Lab:   - Urinalysis with Culture Reflex; Future    5. Class 2 severe obesity due to excess calories with serious comorbidity and body mass index (BMI) of 35.0 to 35.9 in adult ([facility])  Weight loss  Increased Mounjuaro    6. Mixed hyperlipidemia  Stable  CPM  Follow up [facility]  Lab: Lipids    7. Nocturia  Lab:  PSA, Total W Reflex To Free    8. Colon cancer screening  S/p colonoscopy    9. Vitamin D deficiency  Lab:  Vitamin D; Future    10. Risk for falls  Low risk     The patient indicates understanding of these issues and agrees to the plan.  Further testing ordered.  Imaging studies ordered.  Lab work ordered.  Reinforced healthy diet, lifestyle, and exercise.      Return in about 1 year (around 7/27/2025).     SCHUYLER TAM MD, 7/27/2024     Supplementary Documentation:   General Health:  In the past six months, have you lost more than 10 pounds without trying?: 2 - No  Has your appetite been poor?: No  Type of Diet: Balanced  How does the patient maintain a good energy level?: Appropriate Exercise  How would you describe your daily physical activity?: Moderate  How would you describe your current health state?: Good  How do you maintain positive mental well-being?: Social Interaction  On a scale of 0 to 10, with 0 being no pain and 10 being severe pain, what is your pain level?: 0 - (None)  In the past six months, have you experienced urine leakage?: 0-No  At any time do you feel concerned for the safety/well-being of yourself and/or your children,  in your home or elsewhere?: No  Have you had any immunizations at another office such as Influenza, Hepatitis B, Tetanus, or Pneumococcal?: No    Health Maintenance   Topic Date Due    Pneumococcal Vaccine: Birth to 64yrs (2 of 2 - PPSV23 or PCV20) 02/13/2017    Zoster Vaccines (2 of 2) 08/12/2023    COVID-19 Vaccine (6 - 2023-24 season) 09/01/2023    MA Annual Health Assessment  01/01/2024    Diabetes Care Foot Exam  06/17/2024    Diabetes Care: GFR  06/17/2024    Diabetes Care: Microalb/Creat Ratio  06/17/2024    Diabetes Care Dilated Eye Exam  10/04/2024    HTN: BP Follow-Up  08/27/2024    Diabetes Care A1C  09/23/2024    Influenza Vaccine (1) 10/01/2024    DTaP,Tdap,and Td Vaccines (2 - Td or Tdap) 03/20/2025    PSA  06/17/2025    Colorectal Cancer Screening  06/16/2031    Annual Depression Screening  Completed

## 2024-07-28 RX ORDER — ERGOCALCIFEROL 1.25 MG/1
50000 CAPSULE ORAL WEEKLY
Qty: 12 CAPSULE | Refills: 3 | Status: SHIPPED | OUTPATIENT
Start: 2024-07-28 | End: 2025-07-28

## 2024-07-30 ENCOUNTER — MED REC SCAN ONLY (OUTPATIENT)
Dept: FAMILY MEDICINE CLINIC | Facility: CLINIC | Age: 54
End: 2024-07-30

## 2024-07-30 LAB — PROSTATE SPECIFIC AG: 0.6 NG/ML

## 2024-08-08 ENCOUNTER — TELEPHONE (OUTPATIENT)
Dept: FAMILY MEDICINE CLINIC | Facility: CLINIC | Age: 54
End: 2024-08-08

## 2024-08-08 DIAGNOSIS — Z01.00 ENCOUNTER FOR EYE EXAM: Primary | ICD-10-CM

## 2024-08-08 RX ORDER — DAPAGLIFLOZIN 10 MG/1
10 TABLET, FILM COATED ORAL DAILY
Qty: 90 TABLET | Refills: 2 | Status: SHIPPED | OUTPATIENT
Start: 2024-08-08

## 2024-08-08 NOTE — TELEPHONE ENCOUNTER
Patient is requesting referral.     Name of specialist and specialty department: Paolo Banegas (Ophthalmology)    Reason for visit with the specialist: Routine eye exam    Address of the specialist office: [address] [place] [id number]    Appointment date: 10/04/2024       CSS informed patient the turnaround time for referral is 5-7 business days.  Patient was informed to check their [employer] account for referral status.

## 2024-08-08 NOTE — TELEPHONE ENCOUNTER
Patient called stating he spoke to the kidney transplant specialist and was told he can take Farxiga. The patient is requesting the prescription for this.

## 2024-08-09 NOTE — TELEPHONE ENCOUNTER
Spoke to patient, full name and date of birth verified.  Informed patient of Farxiga prescription sent, 90 days with 2 refills.   Patient verbalized understanding.

## 2024-08-09 NOTE — TELEPHONE ENCOUNTER
Dr. Landis,     Patient called requesting referral to Dr. Jones.     Pended referral please review diagnosis and sign off if you agree.    Thank you.  Kaylah Mathias  [facility]

## 2024-08-12 NOTE — TELEPHONE ENCOUNTER
Rx 's pended        Humalog  Free style 14 day sensor  Informed Mounjaro 7.5 sent to pharmacy 7/27/24      Called patient regarding other encounter(8/8/24) for referral and he asked for refills:

## 2024-08-13 RX ORDER — INSULIN LISPRO 200 [IU]/ML
INJECTION, SOLUTION SUBCUTANEOUS
Qty: 15 ML | Refills: 0 | OUTPATIENT
Start: 2024-08-13

## 2024-08-13 RX ORDER — FLASH GLUCOSE SENSOR
KIT MISCELLANEOUS
Qty: 6 EACH | Refills: 3 | Status: SHIPPED | OUTPATIENT
Start: 2024-08-13

## 2024-08-13 RX ORDER — FLASH GLUCOSE SENSOR
KIT MISCELLANEOUS
Qty: 2 EACH | Refills: 3 | OUTPATIENT
Start: 2024-08-13

## 2024-08-13 RX ORDER — INSULIN LISPRO 200 [IU]/ML
12 INJECTION, SOLUTION SUBCUTANEOUS
Qty: 18 ML | Refills: 3 | Status: SHIPPED | OUTPATIENT
Start: 2024-08-13

## 2024-08-13 NOTE — TELEPHONE ENCOUNTER
Called patient (verified name and date of birth)    Since there is a stocking issue at patient's preferred [facility] in [place], I explained to patient that the [facility] in [place] has his Humalog in stock and is getting a refill ready.    I provided the phone number and address of the [facility] for patient.    He understood and have no further questions.    Requested Prescriptions     Pending Prescriptions Disp Refills    Insulin Lispro (HUMALOG KWIKPEN) 200 UNIT/ML Subcutaneous Solution Pen-injector 18 mL 3     Sig: Inject 12 Units into the skin 3 (three) times daily with meals. 150-185 3 units, 186-200 5 units, 201-250 7 units, 251-300 10 units, >301 12 units. Inject 3 (three) times daily with meals     Signed Prescriptions Disp Refills    Continuous Glucose Sensor (FREESTYLE ELKIN 14 DAY SENSOR) Does not apply Misc 6 each 3     Sig: USE FOUR TIMES DAILY AND CHANGE EVERY 14 DAYS     Authorizing Provider: SCHUYLER TAM     Ordering User: DARREL GUZMAN

## 2024-08-13 NOTE — TELEPHONE ENCOUNTER
Please review. Protocol Failed; No Protocol    Requested Prescriptions   Pending Prescriptions Disp Refills    Insulin Lispro (HUMALOG KWIKPEN) 200 UNIT/ML Subcutaneous Solution Pen-injector 6 each 3     Si-185 3 units, 186-200 5 units, 201-250 7 units, 251-300 10 units, >301 12 units. TID with meals       Diabetes Medication Protocol Failed - 2024  1:23 PM        Failed - Last A1C < 7.5 and within past 6 months     Lab Results   Component Value Date    A1C 7.7 (H) 2024             Passed - In person appointment or virtual visit in the past 6 mos or appointment in next 3 mos     Recent Outpatient Visits              2 weeks ago Medicare annual wellness visit, initial    [facility]    Office Visit    4 months ago Type 2 diabetes mellitus with diabetic nephropathy, without long-term current use of insulin ([facility])    [facility]    Office Visit    8 months ago Type 2 diabetes mellitus with diabetic nephropathy, without long-term current use of insulin ([facility])    [facility], [address], Edgar Chaney MD    Office Visit    9 months ago Type 2 diabetes mellitus with diabetic nephropathy, without long-term current use of insulin ([facility])    [facility], [address], Edgar Chaney MD    Office Visit    1 year ago Medicare annual wellness visit, initial    [facility]    [facility]          Future Appointments         Provider Department Appt Notes    In 1 month [facility] [address] [place] [facility]    In 2 months [facility] [address] [place] [facility]                    Passed - Microalbumin procedure in past 12 months or taking ACE/ARB        Passed - EGFRCR or GFRNAA > 50      GFR Evaluation  EGFRCR: 87 , resulted on 7/27/2024          Passed - GFR in the past 12 months         Signed Prescriptions Disp Refills    Continuous Glucose Sensor (FREESTYLE ELKIN 14 DAY SENSOR) Does not apply Misc 6 each 3     Sig: USE FOUR TIMES DAILY AND CHANGE EVERY 14 DAYS       Diabetic Supplies Protocol Passed - 8/12/2024  1:23 PM        Passed - In person appointment or virtual visit in the past 12 mos or appointment in next 3 mos     Recent Outpatient Visits              2 weeks ago Medicare annual wellness visit, initial    [facility]    Office Visit    4 months ago Type 2 diabetes mellitus with diabetic nephropathy, without long-term current use of insulin ([facility])    [facility]    Office Visit    8 months ago Type 2 diabetes mellitus with diabetic nephropathy, without long-term current use of insulin ([facility])    [facility]    Office Visit    9 months ago Type 2 diabetes mellitus with diabetic nephropathy, without long-term current use of insulin ([facility])    [facility]    Office Visit    1 year ago Medicare annual wellness visit, initial    [facility] [address], Edgar Chaney MD    Office Visit          Future Appointments         Provider Department Appt Notes    [facility]    [facility] 2 [facility]                           Future Appointments         Provider Department Appt Notes    In 1 month [facility]    In 2 months [facility]          Recent Outpatient Visits              2 weeks ago Medicare annual wellness visit, initial    [facility]    Office Visit    4 months ago Type 2 diabetes mellitus with diabetic nephropathy, without long-term current use of insulin ([facility])    [facility]    Office Visit    8 months ago Type 2 diabetes mellitus with diabetic nephropathy, without long-term current use of insulin ([facility])    [facility]    Office Visit    9 months ago Type 2 diabetes mellitus with diabetic nephropathy, without long-term current use of insulin ([facility])    [facility]    Office Visit    1 year ago Medicare annual wellness visit, initial    [facility]    Office Visit

## 2024-08-13 NOTE — TELEPHONE ENCOUNTER
Patient called for the status of this refill and to let the doctor know he is out of HUMALOG KWIKPEN.

## 2024-08-13 NOTE — TELEPHONE ENCOUNTER
Outpatient Medication Detail     Disp Refills Start End    Insulin Lispro (HUMALOG KWIKPEN) 200 UNIT/ML Subcutaneous Solution Pen-injector 6 each 3 3/28/2024 --    Si-185 3 units, 186-200 5 units, 201-250 7 units, 251-300 10 units, >301 12 units. TID with meals    Sent to pharmacy as: HumaLOG KwikPen 200 UNIT/ML Subcutaneous Solution Pen-injector (Insulin Lispro)    E-Prescribing Status: Receipt confirmed by pharmacy (3/28/2024  1:00 PM CDT)      Pharmacy    [id number] - [place], [place] - [address] AT [address], [phone], [phone]       Refills on file at [facility].  They do not have humalog in stock. Will need to find pharmacy with stock  [facility], [place],   [facility]  or [facility]     Call placed to [place] [address] [phone]  They pulled prescription, will fill today for patient.

## 2024-09-03 ENCOUNTER — TELEPHONE (OUTPATIENT)
Dept: FAMILY MEDICINE CLINIC | Facility: CLINIC | Age: 54
End: 2024-09-03

## 2024-09-03 NOTE — TELEPHONE ENCOUNTER
Patient gave a phone consent to talk to HA Erwin.    Yaima would like to know the correct Vit D dose.  Patient told her that he is currently taking Vit d 2,000 international units weekly .   Informed that per our record, he should be taking the prescription Vit D 50,000 international units weekly, it was sent on 7/28/24 for a year's supply .   Yaima will instruct the patient , and will contact the pharmacy if he does not have the medication.  .       7/27/24 LABS :    Edgar Landis MD  7/28/2024  7:49 PM CDT       Please notify patient that his/her blood test showed low levels of vitamin D. A prescription was sent to his pharmacy to take one capsule or tablet, once a week. This Rx is good for one year. We'll recheck levels in one year.         MEDICATION RECORD :   Disp Refills Start End    ergocalciferol 1.25 MG (82528 UT) Oral Cap 12 capsule 3 7/28/2024 7/28/2025    Sig - Route: Take 1 capsule (50,000 Units total) by mouth once a week. - Oral    Sent to pharmacy as: Vitamin D (Ergocalciferol) 1.25 MG (70589 UT) Oral Capsule (Vitamin D2)    E-Prescribing Status: Receipt confirmed by pharmacy (7/28/2024  7:50 PM CDT)      Pharmacy    [id number] - [place], [place] - [address] AT [address], [phone], [phone]

## 2024-09-04 ENCOUNTER — MED REC SCAN ONLY (OUTPATIENT)
Dept: FAMILY MEDICINE CLINIC | Facility: CLINIC | Age: 54
End: 2024-09-04

## 2024-09-11 ENCOUNTER — TELEPHONE (OUTPATIENT)
Dept: FAMILY MEDICINE CLINIC | Facility: CLINIC | Age: 54
End: 2024-09-11

## 2024-09-11 NOTE — TELEPHONE ENCOUNTER
Dr Landis, please advise     from [employer] is calling to ask 2 questions for patient.     Question regarding the Repaglinide,  is asking if it still beneficial since the patient is taking 38 units of insulin and is also using the sliding scale. States that generally the Repaglinide is no longer beneficial once the patient starts using over 30 units of insulin.       Question regarding consistent elevated blood pressures?  Blood pressure has been elevated regularly- recommends a blood pressure medication adjustment. Last readings with .   8/9//70  9/3//66    Future Appointments   Date Time Provider Department Center   10/5/2024 12:15 PM Edgar Landis MD [facility]   10/19/2024 11:15 AM [facility]

## 2024-09-12 NOTE — TELEPHONE ENCOUNTER
#1 ok to discontinue Reglaptidine, but may need increase insulin.  #2, Don't change BP meds last visit here his BP were good. Have patient take his BP 3 times a day and return to office with # s in 4 weeks or sooner is SBP is persistent > 140.

## 2024-09-12 NOTE — TELEPHONE ENCOUNTER
Spoke to Luis Felipe, and advised him of 's notes.  Luis Felipe asked that nurse relay information to the patient as well.     Patient advised of 's notes and verbalized understanding.

## 2024-10-05 ENCOUNTER — OFFICE VISIT (OUTPATIENT)
Dept: FAMILY MEDICINE CLINIC | Facility: CLINIC | Age: 54
End: 2024-10-05

## 2024-10-05 VITALS
DIASTOLIC BLOOD PRESSURE: 72 MMHG | HEART RATE: 67 BPM | SYSTOLIC BLOOD PRESSURE: 138 MMHG | WEIGHT: 239.38 LBS | BODY MASS INDEX: 34.27 KG/M2 | HEIGHT: 70 IN

## 2024-10-05 DIAGNOSIS — E11.21 TYPE 2 DIABETES MELLITUS WITH DIABETIC NEPHROPATHY, WITHOUT LONG-TERM CURRENT USE OF INSULIN (HCC): ICD-10-CM

## 2024-10-05 DIAGNOSIS — I10 ESSENTIAL HYPERTENSION: ICD-10-CM

## 2024-10-05 PROCEDURE — 3078F DIAST BP <80 MM HG: CPT | Performed by: FAMILY MEDICINE

## 2024-10-05 PROCEDURE — 99214 OFFICE O/P EST MOD 30 MIN: CPT | Performed by: FAMILY MEDICINE

## 2024-10-05 PROCEDURE — 3075F SYST BP GE 130 - 139MM HG: CPT | Performed by: FAMILY MEDICINE

## 2024-10-05 PROCEDURE — 3008F BODY MASS INDEX DOCD: CPT | Performed by: FAMILY MEDICINE

## 2024-10-05 RX ORDER — ERGOCALCIFEROL 1.25 MG/1
50000 CAPSULE, LIQUID FILLED ORAL WEEKLY
Qty: 12 CAPSULE | Refills: 3 | Status: SHIPPED | OUTPATIENT
Start: 2024-10-05 | End: 2025-10-05

## 2024-10-05 RX ORDER — DAPAGLIFLOZIN 10 MG/1
10 TABLET, FILM COATED ORAL DAILY
Qty: 90 TABLET | Refills: 2 | Status: SHIPPED | OUTPATIENT
Start: 2024-10-05

## 2024-10-05 RX ORDER — AMLODIPINE BESYLATE 10 MG/1
10 TABLET ORAL DAILY
Qty: 90 TABLET | Refills: 3 | Status: SHIPPED | OUTPATIENT
Start: 2024-10-05

## 2024-10-05 RX ORDER — FLASH GLUCOSE SENSOR
KIT MISCELLANEOUS
Qty: 6 EACH | Refills: 3 | Status: SHIPPED | OUTPATIENT
Start: 2024-10-05

## 2024-10-05 RX ORDER — TIRZEPATIDE 7.5 MG/.5ML
7.5 INJECTION, SOLUTION SUBCUTANEOUS WEEKLY
Qty: 2 ML | Refills: 3 | Status: SHIPPED | OUTPATIENT
Start: 2024-10-05 | End: 2024-10-27

## 2024-10-05 RX ORDER — ROSUVASTATIN CALCIUM 20 MG/1
20 TABLET, COATED ORAL NIGHTLY
Qty: 90 TABLET | Refills: 3 | Status: SHIPPED | OUTPATIENT
Start: 2024-10-05

## 2024-10-05 RX ORDER — PEN NEEDLE, DIABETIC 32GX 5/32"
1 NEEDLE, DISPOSABLE MISCELLANEOUS 4 TIMES DAILY
Qty: 400 EACH | Refills: 3 | Status: SHIPPED | OUTPATIENT
Start: 2024-10-05

## 2024-10-05 RX ORDER — INSULIN DEGLUDEC 200 U/ML
38 INJECTION, SOLUTION SUBCUTANEOUS DAILY
Qty: 21 ML | Refills: 3 | Status: SHIPPED | OUTPATIENT
Start: 2024-10-05

## 2024-10-05 RX ORDER — CARVEDILOL 3.12 MG/1
3.12 TABLET ORAL 2 TIMES DAILY WITH MEALS
Qty: 180 TABLET | Refills: 1 | Status: SHIPPED | OUTPATIENT
Start: 2024-10-05

## 2024-10-05 RX ORDER — BLOOD PRESSURE TEST KIT
KIT MISCELLANEOUS
Qty: 1 KIT | Refills: 0 | Status: SHIPPED | OUTPATIENT
Start: 2024-10-05

## 2024-10-05 RX ORDER — INSULIN LISPRO 200 [IU]/ML
12 INJECTION, SOLUTION SUBCUTANEOUS
Qty: 18 ML | Refills: 3 | Status: SHIPPED | OUTPATIENT
Start: 2024-10-05

## 2024-10-05 RX ORDER — PEN NEEDLE, DIABETIC 32GX 5/32"
1 NEEDLE, DISPOSABLE MISCELLANEOUS DAILY
Qty: 100 EACH | Refills: 6 | Status: SHIPPED | OUTPATIENT
Start: 2024-10-05

## 2024-10-05 NOTE — PROGRESS NOTES
10/5/2024  12:28 PM    John Avery is a 54 year old male.    Chief complaint(s):   Chief Complaint   Patient presents with    Follow - Up     HPI:     John Avery primary complaint is regarding multiple issues.     John Brandt is a 54 yrs old male who has type 2, insulin requiring diabetes. Compliance with treatment has been fair.  Patient's diabetes was first diagnosed 2015.  Patient follows a 2000 calorie ADA diet.  Patient report experiencing the following diabetes related symptoms; Negative for polyuria, Negative for polydipsia, Negative for blurred vision.  Depression symptoms include none.  Tobacco screen: none  smoker.  Recently placed on prednisone 5 mg due to renal transplant.  Current meds include :  oral hypoglycemic include: Tradjenta 5 mg, Farxiga 10 mg  insulin/injectable : Tresiba 38 units at bedtime.   Mounjaro 7.5 mg Q week. Hypoglycemia severity is not applicable. He reports home blood glucose readings have been 112 (#s) and believes having fair glucose control.  Most recent lab results include glycohemoglobin 7.5%, microalbuminuria have been Cr 1.05, + GFR 84 s/p renal transplant.  In regard to preventative care, his last ophthalmology exam was in 1 months ago.  Opthalmic evaluation have shown - pathology.  Concurrent relative health problems include HTN/ CRF. Already received his COVID vaccones.     John Brandtis a 54 year old male presents for follow up regarding hypertension / CRI s/p renal transplant. S/p renal transplant 08/11/23. This was first diagnosed more than 5 years ago.  Current nonpharmacologic treatment includes low sodium diet, exercise, and meditation.  His current cardiac medication(s) regimen includes: Carvedilol 3.125, Amlodipine 10 mg.  He has not kept a blood pressure diary, but states that his blood pressures have been poorly controll.  He is tolerating his medication(s)  well without side effects.  Patient has been placed on a waiting list for renal  transplant.      HISTORY:  Past Medical History:    Chicken pox    Dialysis patient (HCC)    mwf rt chest catheter    High blood pressure    High cholesterol    Other and unspecified hyperlipidemia    Renal disorder    Type II or unspecified type diabetes mellitus without mention of complication, not stated as uncontrolled    Unspecified essential hypertension    Visual impairment      Past Surgical History:   Procedure Laterality Date    Hernia surgery  2015    repair of small umbilical hernia; excisin of umbilical pilonidal cyst    Other surgical history  2017    belly button surgery      Family History   Problem Relation Age of Onset    Diabetes Father         type 2    Hypertension Father     Diabetes Maternal Grandmother         type 2    Prostate Cancer Maternal Grandfather     No Known Problems Sister     No Known Problems Sister       Social History:   Social History     Socioeconomic History    Marital status:     Number of children: 2   Occupational History    Occupation:    Tobacco Use    Smoking status: Former     Current packs/day: 0.00     Types: Cigarettes     Quit date: 2014     Years since quittin.9    Smokeless tobacco: Never   Vaping Use    Vaping status: Never Used   Substance and Sexual Activity    Alcohol use: Yes     Alcohol/week: 2.0 standard drinks of alcohol     Types: 2 Shots of liquor per week     Comment: OCC    Drug use: Never   Other Topics Concern    Caffeine Concern Yes     Comment: chocolate-1 cup     Social Determinants of Health     Financial Resource Strain: Medium Risk (2024)    Received from [facility]    Overall Financial Resource Strain (CARDIA)     Difficulty of Paying Living Expenses: Somewhat hard   Food Insecurity: No Food Insecurity (2024)    Received from [facility]    Hunger Vital Sign     Worried About Running Out of Food in the Last Year: Never true     Ran Out of Food in the Last  Year: Never true   Transportation Needs: No Transportation Needs (8/8/2024)    Received from [facility]    PRAPARE - Transportation     Lack of Transportation (Medical): No     Lack of Transportation (Non-Medical): No    Received from [facility]    Social Connections   Housing Stability: Unknown (8/8/2024)    Received from [facility]    Housing Stability Vital Sign     Unable to Pay for Housing in the Last Year: No     Unstable Housing in the Last Year: No        Immunizations:   Immunization History   Administered Date(s) Administered    Covid-19 Vaccine Pfizer 30 mcg/0.3 ml 03/14/2021, 04/11/2021, 08/19/2021    Covid-19 Vaccine Pfizer Bivalent 30mcg/0.3mL 06/17/2023    Covid-19 Vaccine Pfizer Jonathon-Sucrose 30 mcg/0.3 ml 07/19/2022    FLU VAC QIV SPLIT 3 YRS AND OLDER (95658) 09/23/2020    FLULAVAL 6 months & older 0.5 ml Prefilled syringe (54677) 11/15/2019    FLUZONE 6 months and older PFS 0.5 ml (97541) 12/27/2016, 10/01/2021, 10/22/2021, 09/19/2022    Fluvirin, 3 Years & >, Im 12/01/2019    Influenza 09/23/2020    Influenza Vaccine, trivalent (IIV3), PF 0.5mL (07600) 09/23/2020    Influenza Virus Vaccine, Split 12/01/2019    Pfizer Covid-19 Vaccine 30mcg/0.3ml 12yrs+ 05/01/2024    Pneumococcal (Prevnar 13) 12/19/2016    TDAP 03/20/2015    Zoster Vaccine Recombinant Adjuvanted (Shingrix) 06/17/2023   Deferred Date(s) Deferred    FLUZONE 6 months and older PFS 0.5 ml (16666) 12/19/2016       Medications (Active prior to today's visit):  Current Outpatient Medications   Medication Sig Dispense Refill    amLODIPine 10 MG Oral Tab Take 1 tablet (10 mg total) by mouth daily. 90 tablet 3    Blood Pressure Does not apply Kit Use q day 1 kit 0    carvedilol 3.125 MG Oral Tab Take 1 tablet (3.125 mg total) by mouth 2 (two) times daily with meals. 180 tablet 1    Continuous Glucose Sensor (FREESTYLE ELKIN 14 DAY SENSOR) Does not  apply Misc USE FOUR TIMES DAILY AND CHANGE EVERY 14 DAYS 6 each 3    dapagliflozin (FARXIGA) 10 MG Oral Tab Take 1 tablet (10 mg total) by mouth daily. 90 tablet 2    ergocalciferol 1.25 MG (66427 UT) Oral Cap Take 1 capsule (50,000 Units total) by mouth once a week. 12 capsule 3    insulin degludec (TRESIBA FLEXTOUCH) 200 UNIT/ML Subcutaneous Solution Pen-injector Inject 38 Units into the skin daily. 21 mL 3    Insulin Lispro (HUMALOG KWIKPEN) 200 UNIT/ML Subcutaneous Solution Pen-injector Inject 12 Units into the skin 3 (three) times daily with meals. 150-185 3 units, 186-200 5 units, 201-250 7 units, 251-300 10 units, >301 12 units. Inject 3 (three) times daily with meals 18 mL 3    Insulin Pen Needle (BD PEN NEEDLE RUFUS 2ND GEN) 32G X 4 MM Does not apply Misc Take 1 each by mouth daily. 100 each 6    Insulin Pen Needle (BD PEN NEEDLE RUFUS U/F) 32G X 4 MM Does not apply Misc Inject 1 Needle into the skin 4 (four) times daily. For use with insulins 400 each 3    rosuvastatin 20 MG Oral Tab Take 1 tablet (20 mg total) by mouth nightly. 90 tablet 3    Tirzepatide (MOUNJARO) 7.5 MG/0.5ML Subcutaneous Solution Pen-injector Inject 7.5 mg into the skin once a week for 4 doses. 2 mL 3    cinacalcet 60 MG Oral Tab Take 1 tablet (60 mg total) by mouth.      tacrolimus 1 MG Oral Cap Take by mouth 2 (two) times daily.      mycophenolate sodium 360 MG Oral Tab EC Take 2 tablets (720 mg total) by mouth 2 (two) times daily before meals.         Allergies:  No Known Allergies      ROS:   Review of Systems   Constitutional:  Negative for appetite change, fatigue and fever.   Eyes:  Negative for visual disturbance.   Respiratory:  Negative for shortness of breath.    Cardiovascular:  Negative for chest pain.   Gastrointestinal:  Negative for abdominal pain.   Endocrine: Negative for polydipsia and polyuria.   Musculoskeletal:  Negative for myalgias.   Skin:  Negative for rash.   Neurological:  Negative for dizziness, weakness and  headaches.       PHYSICAL EXAM:   VS: /72   Pulse 67   Ht 5' 10\" (1.778 m)   Wt 239 lb 6.4 oz (108.6 kg)   BMI 34.35 kg/m²     Physical Exam  Vitals reviewed.   Constitutional:       Appearance: Normal appearance. He is well-developed.   HENT:      Head: Normocephalic.   Eyes:      General: No scleral icterus.     Conjunctiva/sclera: Conjunctivae normal.   Cardiovascular:      Rate and Rhythm: Normal rate.   Pulmonary:      Effort: Pulmonary effort is normal.   Musculoskeletal:      Cervical back: Neck supple.   Skin:     Findings: No rash.   Psychiatric:         Mood and Affect: Mood normal.         LABORATORY RESULTS:   No results found for: \"URCOLOR\", \"URCLA\", \"URINELEUK\", \"URINENITRITE\", \"URINEBLOOD\"   Results for orders placed or performed in visit on 07/27/24   Comp Metabolic Panel (14)   Result Value Ref Range    Glucose 109 (H) 70 - 99 mg/dL    Sodium 145 136 - 145 mmol/L    Potassium 4.3 3.5 - 5.1 mmol/L    Chloride 113 (H) 98 - 112 mmol/L    CO2 25.0 21.0 - 32.0 mmol/L    Anion Gap 7 0 - 18 mmol/L    BUN 27 (H) 9 - 23 mg/dL    Creatinine 1.02 0.70 - 1.30 mg/dL    BUN/CREA Ratio 26.5 (H) 10.0 - 20.0    Calcium, Total 9.6 8.7 - 10.4 mg/dL    Calculated Osmolality 306 (H) 275 - 295 mOsm/kg    eGFR-Cr 87 >=60 mL/min/1.73m2    ALT 54 (H) 10 - 49 U/L    AST 34 (H) <34 U/L    Alkaline Phosphatase 232 (H) 45 - 117 U/L    Bilirubin, Total 0.8 0.3 - 1.2 mg/dL    Total Protein 7.2 5.7 - 8.2 g/dL    Albumin 4.8 3.2 - 4.8 g/dL    Globulin  2.4 2.0 - 3.5 g/dL    A/G Ratio 2.0 1.0 - 2.0    Patient Fasting for CMP? Yes    Hemoglobin A1C   Result Value Ref Range    HgbA1C 7.7 (H) <5.7 %    Estimated Average Glucose 174 (H) 68 - 126 mg/dL   Lipid Panel   Result Value Ref Range    Cholesterol, Total 128 <200 mg/dL    HDL Cholesterol 39 (L) 40 - 59 mg/dL    Triglycerides 116 30 - 149 mg/dL    LDL Cholesterol 68 <100 mg/dL    VLDL 18 0 - 30 mg/dL    Non HDL Chol 89 <130 mg/dL    Patient Fasting for Lipid? Yes     Urinalysis with Culture Reflex    Specimen: Urine   Result Value Ref Range    Urine Color Light-Yellow Yellow    Clarity Urine Clear Clear    Spec Gravity 1.018 1.005 - 1.030    Glucose Urine Normal Normal mg/dL    Bilirubin Urine Negative Negative    Ketones Urine Negative Negative mg/dL    Blood Urine Negative Negative    pH Urine 5.5 5.0 - 8.0    Protein Urine Negative Negative mg/dL    Urobilinogen Urine Normal Normal    Nitrite Urine Negative Negative    Leukocyte Esterase Urine Negative Negative    Microscopic Microscopic not indicated    Microalb/Creat Ratio, Random Urine   Result Value Ref Range    Microalbumin, Urine 1.40 mg/dL    Creatinine Ur Random 59.20 mg/dL    Malb/Cre Calc 23.6 <=30.0 ug/mg   Vitamin D, 25-Hydroxy   Result Value Ref Range    Vitamin D, 25OH, Total 21.2 (L) 30.0 - 100.0 ng/mL   CBC W/ DIFFERENTIAL   Result Value Ref Range    WBC 6.3 4.0 - 11.0 x10(3) uL    RBC 4.54 4.30 - 5.70 x10(6)uL    HGB 14.1 13.0 - 17.5 g/dL    HCT 41.5 39.0 - 53.0 %    MCV 91.4 80.0 - 100.0 fL    MCH 31.1 26.0 - 34.0 pg    MCHC 34.0 31.0 - 37.0 g/dL    RDW-SD 40.7 35.1 - 46.3 fL    RDW 12.1 11.0 - 15.0 %    .0 150.0 - 450.0 10(3)uL    Neutrophil Absolute Prelim 4.22 1.50 - 7.70 x10 (3) uL    Neutrophil Absolute 4.22 1.50 - 7.70 x10(3) uL    Lymphocyte Absolute 1.23 1.00 - 4.00 x10(3) uL    Monocyte Absolute 0.58 0.10 - 1.00 x10(3) uL    Eosinophil Absolute 0.18 0.00 - 0.70 x10(3) uL    Basophil Absolute 0.07 0.00 - 0.20 x10(3) uL    Immature Granulocyte Absolute 0.02 0.00 - 1.00 x10(3) uL    Neutrophil % 67.0 %    Lymphocyte % 19.5 %    Monocyte % 9.2 %    Eosinophil % 2.9 %    Basophil % 1.1 %    Immature Granulocyte % 0.3 %     EKG / Spirometry : -     Radiology: No results found.     ASSESSMENT/PLAN:   Assessment   Encounter Diagnoses   Name Primary?    Type 2 diabetes mellitus with diabetic nephropathy, without long-term current use of insulin (HCC)     Essential hypertension        DIABETES  A&P    LABORATORY & ORDERS: Blood test(s) ordered today ; No orders of the defined types were placed in this encounter.    Additional orders include: CPM  MEDICATIONS:    amLODIPine 10 MG Oral Tab, Take 1 tablet (10 mg total) by mouth daily., Disp: 90 tablet, Rfl: 3    Blood Pressure Does not apply Kit, Use q day, Disp: 1 kit, Rfl: 0    carvedilol 3.125 MG Oral Tab, Take 1 tablet (3.125 mg total) by mouth 2 (two) times daily with meals., Disp: 180 tablet, Rfl: 1    Continuous Glucose Sensor (FREESTYLE ELKIN 14 DAY SENSOR) Does not apply Misc, USE FOUR TIMES DAILY AND CHANGE EVERY 14 DAYS, Disp: 6 each, Rfl: 3    dapagliflozin (FARXIGA) 10 MG Oral Tab, Take 1 tablet (10 mg total) by mouth daily., Disp: 90 tablet, Rfl: 2    ergocalciferol 1.25 MG (12466 UT) Oral Cap, Take 1 capsule (50,000 Units total) by mouth once a week., Disp: 12 capsule, Rfl: 3    insulin degludec (TRESIBA FLEXTOUCH) 200 UNIT/ML Subcutaneous Solution Pen-injector, Inject 38 Units into the skin daily., Disp: 21 mL, Rfl: 3    Insulin Lispro (HUMALOG KWIKPEN) 200 UNIT/ML Subcutaneous Solution Pen-injector, Inject 12 Units into the skin 3 (three) times daily with meals. 150-185 3 units, 186-200 5 units, 201-250 7 units, 251-300 10 units, >301 12 units. Inject 3 (three) times daily with meals, Disp: 18 mL, Rfl: 3    Insulin Pen Needle (BD PEN NEEDLE RUFUS 2ND GEN) 32G X 4 MM Does not apply Misc, Take 1 each by mouth daily., Disp: 100 each, Rfl: 6    Insulin Pen Needle (BD PEN NEEDLE RUFUS U/F) 32G X 4 MM Does not apply Misc, Inject 1 Needle into the skin 4 (four) times daily. For use with insulins, Disp: 400 each, Rfl: 3    rosuvastatin 20 MG Oral Tab, Take 1 tablet (20 mg total) by mouth nightly., Disp: 90 tablet, Rfl: 3    Tirzepatide (MOUNJARO) 7.5 MG/0.5ML Subcutaneous Solution Pen-injector, Inject 7.5 mg into the skin once a week for 4 doses., Disp: 2 mL, Rfl: 3    cinacalcet 60 MG Oral Tab, Take 1 tablet (60 mg total) by mouth., Disp: , Rfl:      tacrolimus 1 MG Oral Cap, Take by mouth 2 (two) times daily., Disp: , Rfl:     mycophenolate sodium 360 MG Oral Tab EC, Take 2 tablets (720 mg total) by mouth 2 (two) times daily before meals., Disp: , Rfl: .  Requested Prescriptions     Signed Prescriptions Disp Refills    amLODIPine 10 MG Oral Tab 90 tablet 3     Sig: Take 1 tablet (10 mg total) by mouth daily.    Blood Pressure Does not apply Kit 1 kit 0     Sig: Use q day    carvedilol 3.125 MG Oral Tab 180 tablet 1     Sig: Take 1 tablet (3.125 mg total) by mouth 2 (two) times daily with meals.    Continuous Glucose Sensor (FREESTYLE ELKIN 14 DAY SENSOR) Does not apply Misc 6 each 3     Sig: USE FOUR TIMES DAILY AND CHANGE EVERY 14 DAYS    dapagliflozin (FARXIGA) 10 MG Oral Tab 90 tablet 2     Sig: Take 1 tablet (10 mg total) by mouth daily.    ergocalciferol 1.25 MG (11730 UT) Oral Cap 12 capsule 3     Sig: Take 1 capsule (50,000 Units total) by mouth once a week.    insulin degludec (TRESIBA FLEXTOUCH) 200 UNIT/ML Subcutaneous Solution Pen-injector 21 mL 3     Sig: Inject 38 Units into the skin daily.    Insulin Lispro (HUMALOG KWIKPEN) 200 UNIT/ML Subcutaneous Solution Pen-injector 18 mL 3     Sig: Inject 12 Units into the skin 3 (three) times daily with meals. 150-185 3 units, 186-200 5 units, 201-250 7 units, 251-300 10 units, >301 12 units. Inject 3 (three) times daily with meals    Insulin Pen Needle (BD PEN NEEDLE RUFUS 2ND GEN) 32G X 4 MM Does not apply Misc 100 each 6     Sig: Take 1 each by mouth daily.    Insulin Pen Needle (BD PEN NEEDLE RUFUS U/F) 32G X 4 MM Does not apply Misc 400 each 3     Sig: Inject 1 Needle into the skin 4 (four) times daily. For use with insulins    rosuvastatin 20 MG Oral Tab 90 tablet 3     Sig: Take 1 tablet (20 mg total) by mouth nightly.    Tirzepatide (MOUNJARO) 7.5 MG/0.5ML Subcutaneous Solution Pen-injector 2 mL 3     Sig: Inject 7.5 mg into the skin once a week for 4 doses.     RECOMMENDATIONS: instructed in use of  glucometer ( check fasting glucose daily), return for training in administering insulin injections, adherence to an 1800 calorie ADA diet,  10 pound weight loss, a graduated exercise program, HgbA1C level checked quarterly, daily foot self-inspection, need for yearly flu shots, and avoid all sodas, juices, candy, chocolates, cakes, ice cream, etc.      FOLLOW-UP: Schedule a follow-up visit in 6 months.       COUNSELING: The patient was counseled concerning the relationship between diabetes control and macrovascular disease including cardiovascular, cerebrovascular and peripheral vascular disease. The patient was counseled concerning the relationship between diabetes control and retinopathy, nephropathy, and neuropathy. Advised as to the targets of pre-meal glucoses ( mg/dl) and post meal glucoses (<140-160 mg/dl) Home glucose testing discussed. The A1c target of <7% according to ADA and <6.5% according to AACE were discussed.             Orders This Visit:  No orders of the defined types were placed in this encounter.      Meds This Visit:  Requested Prescriptions     Signed Prescriptions Disp Refills    amLODIPine 10 MG Oral Tab 90 tablet 3     Sig: Take 1 tablet (10 mg total) by mouth daily.    Blood Pressure Does not apply Kit 1 kit 0     Sig: Use q day    carvedilol 3.125 MG Oral Tab 180 tablet 1     Sig: Take 1 tablet (3.125 mg total) by mouth 2 (two) times daily with meals.    Continuous Glucose Sensor (FREESTYLE ELKIN 14 DAY SENSOR) Does not apply Misc 6 each 3     Sig: USE FOUR TIMES DAILY AND CHANGE EVERY 14 DAYS    dapagliflozin (FARXIGA) 10 MG Oral Tab 90 tablet 2     Sig: Take 1 tablet (10 mg total) by mouth daily.    ergocalciferol 1.25 MG (39533 UT) Oral Cap 12 capsule 3     Sig: Take 1 capsule (50,000 Units total) by mouth once a week.    insulin degludec (TRESIBA FLEXTOUCH) 200 UNIT/ML Subcutaneous Solution Pen-injector 21 mL 3     Sig: Inject 38 Units into the skin daily.    Insulin Lispro  (HUMALOG KWIKPEN) 200 UNIT/ML Subcutaneous Solution Pen-injector 18 mL 3     Sig: Inject 12 Units into the skin 3 (three) times daily with meals. 150-185 3 units, 186-200 5 units, 201-250 7 units, 251-300 10 units, >301 12 units. Inject 3 (three) times daily with meals    Insulin Pen Needle (BD PEN NEEDLE RUFUS 2ND GEN) 32G X 4 MM Does not apply Misc 100 each 6     Sig: Take 1 each by mouth daily.    Insulin Pen Needle (BD PEN NEEDLE RUFUS U/F) 32G X 4 MM Does not apply Misc 400 each 3     Sig: Inject 1 Needle into the skin 4 (four) times daily. For use with insulins    rosuvastatin 20 MG Oral Tab 90 tablet 3     Sig: Take 1 tablet (20 mg total) by mouth nightly.    Tirzepatide (MOUNJARO) 7.5 MG/0.5ML Subcutaneous Solution Pen-injector 2 mL 3     Sig: Inject 7.5 mg into the skin once a week for 4 doses.       Imaging & Referrals:  None         SCHUYLER TAM MD

## 2024-10-18 ENCOUNTER — TELEPHONE (OUTPATIENT)
Dept: FAMILY MEDICINE CLINIC | Facility: CLINIC | Age: 54
End: 2024-10-18

## 2024-10-18 DIAGNOSIS — Z94.0 HISTORY OF KIDNEY TRANSPLANT (HCC): ICD-10-CM

## 2024-10-18 DIAGNOSIS — N18.5 CHRONIC KIDNEY DISEASE, STAGE V (HCC): Primary | ICD-10-CM

## 2024-10-18 NOTE — TELEPHONE ENCOUNTER
Patient is requesting referral.     Name of specialist and specialty department : Dr.Leon Cortez  Reason for visit with the specialist: consult   Address of the specialist office [place]   Appointment date: 10/21/24         CSS informed patient the turnaround time for referral is 5-7 business days.  Patient was informed to check their [employer] account for referral status.

## 2024-10-18 NOTE — TELEPHONE ENCOUNTER
Dr. Landis, *    Patient called requesting referral to Dr. Cortez.     Pended referral please review diagnosis and sign off if you agree.    Thank you.  Kaylah Mathias  [facility]     Physical Therapy Treatment Note    Patient's Name: Krista England  : 1953       03/08/21 1531   PT Last Visit   PT Visit Date 21   Note Type   Note Type Treatment   Pain Assessment   Pain Assessment Tool Pain Assessment not indicated - pt denies pain   Pain Score No Pain   Restrictions/Precautions   Weight Bearing Precautions Per Order Yes   RLE Weight Bearing Per Order NWB   Other Precautions WBS; Multiple lines; Fall Risk   General   Chart Reviewed Yes   Family/Caregiver Present No   Cognition   Overall Cognitive Status WFL   Arousal/Participation Alert   Orientation Level Oriented X4   Following Commands Follows all commands and directions without difficulty   Comments Pt very pleasant and cooperative throughout session   Bed Mobility   Additional Comments Pt OOB on BSC upon PT arrival   Transfers   Sit to Stand 5  Supervision   Stand to Sit 5  Supervision   Additional Comments with RW, cues for approach to sitting surface, safety with rotational turns, CGA with rotational turns   Ambulation/Elevation   Gait pattern   (3 point gait pattern)   Gait Assistance 5  Supervision   Additional items Assist x 1   Assistive Device Rolling walker   Distance 45 ftx1, 40 ftx1, seated rest break between trials  VC's for proximity to RW, safety with turning  Pt discussed possible crutch use, educated on RW for safety at this time given wound vac and proximity to crutches as well as continued standing balance difficulty with turning    Discussed knee scooter use, pt's spouse states they have knee scooter at home that he has used in the past    Stair Management Assistance Not tested   Balance   Static Sitting Good   Dynamic Sitting Fair   Static Standing Fair -   Dynamic Standing Fair -   Ambulatory Poor +   Activity Tolerance   Activity Tolerance Patient limited by fatigue   Medical Staff González Caban MD arrived during session, limiting further activity   Nurse Made Aware RN updated   Assessment   Prognosis Good   Problem List Decreased strength;Decreased endurance; Impaired balance;Decreased mobility; Decreased safety awareness;Orthopedic restrictions   Assessment Pt very motivated to improve and participate, making further functional gains, remains limited by fatigue and balance  Pt educated on RW use for safety with ambulation, discussed concerns over crutch use given wound vac line and balance impairments, verbalized understanding  Pt would be a good candidate for knee scooter trial   Pt's spouse arrived during session and discussed above recommendations  Pt maintains NWB appropriately throughout session, 2x LOB with rotational turns  Goals extended as they remain appropriate  Pt will benefit from continued skilled PT intervention during course of hospital stay to improve strength, endurance and balance to improve safety with functional mobility  Recommend IP rehab upon hospital D/C  Goals   Patient Goals to go to rehab   STG Expiration Date 03/15/21   Short Term Goal #2 In addition to previous goals: Pt will be able to mobilize 200' with knee scooter including 2 rotational turns and navigating obstacles independently to improve ease of community access  PT Treatment Day 6   Plan   Treatment/Interventions Functional transfer training;LE strengthening/ROM; Therapeutic exercise;Elevations; Endurance training;Patient/family training;Equipment eval/education; Bed mobility;Gait training   Progress Progressing toward goals   PT Frequency Other (Comment)  (3-5x/week)   Recommendation   PT Discharge Recommendation Post-Acute Rehabilitation Services   Equipment Recommended [facility] Recommended Wheeled walker   Change/add to [place] [address]?  No   PT - OK to Discharge Yes  (to IP rehab)   Roman 8 in Bed Without Bedrails 4   Lying on Back to Sitting on Edge of Flat Bed 4   Moving Bed to Chair 3   Standing Up From Chair 3   Walk in Room 3   Climb 3-5 Stairs 2   Basic Mobility Inpatient Raw Score 19   Basic Mobility Standardized Score 42 48     Gregorio Lomeli, PT, DPT

## 2024-11-01 ENCOUNTER — TELEPHONE (OUTPATIENT)
Dept: FAMILY MEDICINE CLINIC | Facility: CLINIC | Age: 54
End: 2024-11-01

## 2024-11-01 RX ORDER — BLOOD-GLUCOSE SENSOR
1 EACH MISCELLANEOUS
Qty: 6 EACH | Refills: 3 | Status: SHIPPED | OUTPATIENT
Start: 2024-11-01

## 2024-11-01 NOTE — TELEPHONE ENCOUNTER
Patient calling to state cannot get Freestyle Sensor, stated was told by pharmacy to request Free Sensor New Plus.

## 2024-11-25 ENCOUNTER — MED REC SCAN ONLY (OUTPATIENT)
Dept: FAMILY MEDICINE CLINIC | Facility: CLINIC | Age: 54
End: 2024-11-25

## 2024-11-25 NOTE — TELEPHONE ENCOUNTER
Chasidy from [employer] with patient's [employer] is calling to advise of medication refills for patients    Carvedilol 3.125 mg    Vitamin D 1.25 mg    Greta in [place], [place] confirmed

## 2024-11-29 RX ORDER — CARVEDILOL 3.12 MG/1
3.12 TABLET ORAL 2 TIMES DAILY WITH MEALS
Qty: 180 TABLET | Refills: 1 | OUTPATIENT
Start: 2024-11-29

## 2024-11-29 RX ORDER — ERGOCALCIFEROL 1.25 MG/1
50000 CAPSULE, LIQUID FILLED ORAL WEEKLY
Qty: 12 CAPSULE | Refills: 3 | OUTPATIENT
Start: 2024-11-29 | End: 2025-11-29

## 2024-11-29 NOTE — TELEPHONE ENCOUNTER
Please call pharmacy and verify these prescription are available to fill.    Outpatient Medication Detail     Disp Refills Start End    ergocalciferol 1.25 MG (85834 UT) Oral Cap 12 capsule 3 10/5/2024 10/5/2025    Sig - Route: Take 1 capsule (50,000 Units total) by mouth once a week. - Oral    Sent to pharmacy as: Vitamin D (Ergocalciferol) 1.25 MG (86278 UT) Oral Capsule (Vitamin D2)    E-Prescribing Status: Receipt confirmed by pharmacy (10/5/2024 12:38 PM CDT)      Pharmacy    [id number] [place] [address] AT [facility] [address], [phone], [phone]     Outpatient Medication Detail     Disp Refills Start End    carvedilol 3.125 MG Oral Tab 180 tablet 1 10/5/2024 --    Sig - Route: Take 1 tablet (3.125 mg total) by mouth 2 (two) times daily with meals. - Oral    Sent to pharmacy as: Carvedilol 3.125 MG Oral Tablet (Coreg)    E-Prescribing Status: Receipt confirmed by pharmacy (10/5/2024 12:38 PM CDT)      Pharmacy    [id number] [place] [address] AT [facility] [address], [phone], [phone]

## 2025-02-04 ENCOUNTER — TELEPHONE (OUTPATIENT)
Dept: CASE MANAGEMENT | Age: 55
End: 2025-02-04

## 2025-02-04 DIAGNOSIS — Z79.60 LONG-TERM USE OF IMMUNOSUPPRESSANT MEDICATION: ICD-10-CM

## 2025-02-04 DIAGNOSIS — Z94.0 HISTORY OF KIDNEY TRANSPLANT (HCC): Primary | ICD-10-CM

## 2025-02-04 DIAGNOSIS — E83.9 CHRONIC KIDNEY DISEASE-MINERAL AND BONE DISORDER: ICD-10-CM

## 2025-02-04 DIAGNOSIS — N18.9 CHRONIC KIDNEY DISEASE-MINERAL AND BONE DISORDER: ICD-10-CM

## 2025-02-04 DIAGNOSIS — M89.9 CHRONIC KIDNEY DISEASE-MINERAL AND BONE DISORDER: ICD-10-CM

## 2025-02-04 NOTE — TELEPHONE ENCOUNTER
Dr. Landis,*    Lindsay with [facility] requesting referral to Dr. Elvia Torres for office visit on 2/4/25.    Patient had visit and request for referral was made after the appointment.     Pended referral please review diagnosis and sign off if you agree.    Thank you.  Kaylah Mathias  [facility]

## 2025-02-07 ENCOUNTER — TELEPHONE (OUTPATIENT)
Dept: FAMILY MEDICINE CLINIC | Facility: CLINIC | Age: 55
End: 2025-02-07

## 2025-02-07 ENCOUNTER — PATIENT MESSAGE (OUTPATIENT)
Dept: FAMILY MEDICINE CLINIC | Facility: CLINIC | Age: 55
End: 2025-02-07

## 2025-02-07 NOTE — TELEPHONE ENCOUNTER
Pharmacy requesting refill     MOUNJARO 7.5MG/0.5ML INJ (4 PENS)  Administer 7.5mg under the skin 1 time a week for 4 doses Qty: 2

## 2025-02-07 NOTE — TELEPHONE ENCOUNTER
Patient needs a form filled out for a handicapped sticker. He would like to see the doctor on 2/8/25, but the first available appointment is on 2/10/25. Patient did not want to wait until then for an appointment.  Please reply to pool: EM CC IM FM ALG RHE [[phone]]

## 2025-02-08 ENCOUNTER — HOSPITAL ENCOUNTER (OUTPATIENT)
Dept: GENERAL RADIOLOGY | Age: 55
Discharge: HOME | End: 2025-02-08
Attending: NURSE PRACTITIONER
Payer: MEDICARE

## 2025-02-08 ENCOUNTER — OFFICE VISIT (OUTPATIENT)
Dept: FAMILY MEDICINE CLINIC | Facility: CLINIC | Age: 55
End: 2025-02-08
Payer: MEDICARE

## 2025-02-08 VITALS
DIASTOLIC BLOOD PRESSURE: 79 MMHG | BODY MASS INDEX: 31 KG/M2 | HEART RATE: 68 BPM | TEMPERATURE: 98 F | SYSTOLIC BLOOD PRESSURE: 140 MMHG | RESPIRATION RATE: 16 BRPM | WEIGHT: 217.63 LBS

## 2025-02-08 DIAGNOSIS — Z91.81 STATUS POST FALL: ICD-10-CM

## 2025-02-08 DIAGNOSIS — M54.2 NECK PAIN: ICD-10-CM

## 2025-02-08 DIAGNOSIS — T22.212A PARTIAL THICKNESS BURN OF LEFT FOREARM, INITIAL ENCOUNTER: Primary | ICD-10-CM

## 2025-02-08 DIAGNOSIS — M54.41 ACUTE RIGHT-SIDED LOW BACK PAIN WITH RIGHT-SIDED SCIATICA: ICD-10-CM

## 2025-02-08 DIAGNOSIS — T22.212A PARTIAL THICKNESS BURN OF LEFT FOREARM, INITIAL ENCOUNTER: ICD-10-CM

## 2025-02-08 DIAGNOSIS — M25.561 ACUTE PAIN OF RIGHT KNEE: ICD-10-CM

## 2025-02-08 PROCEDURE — 72110 X-RAY EXAM L-2 SPINE 4/>VWS: CPT | Performed by: NURSE PRACTITIONER

## 2025-02-08 PROCEDURE — 73564 X-RAY EXAM KNEE 4 OR MORE: CPT | Performed by: NURSE PRACTITIONER

## 2025-02-08 PROCEDURE — 72050 X-RAY EXAM NECK SPINE 4/5VWS: CPT | Performed by: NURSE PRACTITIONER

## 2025-02-08 NOTE — PROGRESS NOTES
HPI    Patient presents for concerns of left forearm burn, occurred on 2/3.  Also with concerns of fall that occurred on 2/6.  Patient reports that he fell at work and hit his right leg, back and head.  Was seen by a provider through a provider by his job through video.  Did not have any in person assessment or imaging completed.      Review of Systems   Musculoskeletal:  Positive for back pain and neck pain.        Right knee, right lower leg pain.     Skin:  Positive for wound (burn left forearm).   All other systems reviewed and are negative.       Vitals:    02/08/25 1010   BP: 140/79   Pulse: 68   Resp: 16   Temp: 98 °F (36.7 °C)   TempSrc: Tympanic   Weight: 217 lb 9.6 oz (98.7 kg)     Body mass index is 31.22 kg/m².    Health Maintenance   Topic Date Due    Pneumococcal Vaccine: 50+ Years (2 of 2 - PPSV23) 02/13/2017    Zoster Vaccines (2 of 2) 08/12/2023    COVID-19 Vaccine (7 - 2024-25 season) 09/01/2024    Influenza Vaccine (1) 10/01/2024    Annual Well Visit  01/01/2025    Annual Depression Screening  01/01/2025    Diabetes Care: Foot Exam (Annual)  01/01/2025    Diabetes Care: Microalb/Creat Ratio (Annual)  01/01/2025    Diabetes Care A1C  01/27/2025    DTaP,Tdap,and Td Vaccines (2 - Td or Tdap) 03/20/2025    Diabetes Care: GFR  07/27/2025    Diabetes Care Dilated Eye Exam  11/15/2025    PSA  07/27/2026    Colorectal Cancer Screening  06/16/2031    Meningococcal B Vaccine  Aged Out       Past Medical History:    Chicken pox    Dialysis patient    [facility]wf rt chest catheter    High blood pressure    High cholesterol    Other and unspecified hyperlipidemia    Renal disorder    Type II or unspecified type diabetes mellitus without mention of complication, not stated as uncontrolled    Unspecified essential hypertension    Visual impairment       .  Past Surgical History:   Procedure Laterality Date    Hernia surgery  05/12/2015    repair of small umbilical hernia; excisin of umbilical pilonidal cyst    Other  surgical history  05/2017    belly button surgery       Family History   Problem Relation Age of Onset    Diabetes Father         type 2    Hypertension Father     Diabetes Maternal Grandmother         type 2    Prostate Cancer Maternal Grandfather     No Known Problems Sister     No Known Problems Sister        Social History     Socioeconomic History    Marital status:      Spouse name: Not on file    Number of children: 2    Years of education: Not on file    Highest education level: Not on file   Occupational History    Occupation:    Tobacco Use    Smoking status: Former     Current packs/day: 0.00     Types: Cigarettes     Quit date: 11/1/2014     Years since quitting: 10.2    Smokeless tobacco: Never   Vaping Use    Vaping status: Never Used   Substance and Sexual Activity    Alcohol use: Yes     Alcohol/week: 2.0 standard drinks of alcohol     Types: 2 Shots of liquor per week     Comment: OCC    Drug use: Never    Sexual activity: Not on file   Other Topics Concern     Service Not Asked    Blood Transfusions Not Asked    Caffeine Concern Yes     Comment: chocolate-1 cup    Occupational Exposure Not Asked    Hobby Hazards Not Asked    Sleep Concern Not Asked    Stress Concern Not Asked    Weight Concern Not Asked    Special Diet Not Asked    Back Care Not Asked    Exercise Not Asked    Bike Helmet Not Asked    Seat Belt Not Asked    Self-Exams Not Asked   Social History Narrative    Not on file     Social Drivers of Health     Food Insecurity: No Food Insecurity (8/8/2024)    Received from [facility]    Hunger Vital Sign     Worried About Running Out of Food in the Last Year: Never true     Ran Out of Food in the Last Year: Never true   Transportation Needs: No Transportation Needs (8/8/2024)    Received from [facility]    PRAPARE - Transportation     Lack of Transportation (Medical): No     Lack of Transportation (Non-Medical): No   Stress:  Not on file   Housing Stability: Unknown (8/8/2024)    Received from [facility]    Housing Stability Vital Sign     Unable to Pay for Housing in the Last Year: No     Number of Places Lived in the Last Year: Not on file     Unstable Housing in the Last Year: No       Current Outpatient Medications   Medication Sig Dispense Refill    Continuous Glucose Sensor (FREESTYLE ELKIN 3 PLUS SENSOR) Does not apply Misc 1 each every 14 (fourteen) days. USE FOUR TIMES DAILY AND CHANGE EVERY 14 DAYS. 6 each 3    amLODIPine 10 MG Oral Tab Take 1 tablet (10 mg total) by mouth daily. 90 tablet 3    Blood Pressure Does not apply Kit Use q day 1 kit 0    carvedilol 3.125 MG Oral Tab Take 1 tablet (3.125 mg total) by mouth 2 (two) times daily with meals. 180 tablet 1    Continuous Glucose Sensor (FREESTYLE ELKIN 14 DAY SENSOR) Does not apply Misc USE FOUR TIMES DAILY AND CHANGE EVERY 14 DAYS 6 each 3    dapagliflozin (FARXIGA) 10 MG Oral Tab Take 1 tablet (10 mg total) by mouth daily. 90 tablet 2    ergocalciferol 1.25 MG (99991 UT) Oral Cap Take 1 capsule (50,000 Units total) by mouth once a week. 12 capsule 3    insulin degludec (TRESIBA FLEXTOUCH) 200 UNIT/ML Subcutaneous Solution Pen-injector Inject 38 Units into the skin daily. 21 mL 3    Insulin Lispro (HUMALOG KWIKPEN) 200 UNIT/ML Subcutaneous Solution Pen-injector Inject 12 Units into the skin 3 (three) times daily with meals. 150-185 3 units, 186-200 5 units, 201-250 7 units, 251-300 10 units, >301 12 units. Inject 3 (three) times daily with meals 18 mL 3    Insulin Pen Needle (BD PEN NEEDLE RUFUS 2ND GEN) 32G X 4 MM Does not apply Misc Take 1 each by mouth daily. 100 each 6    Insulin Pen Needle (BD PEN NEEDLE RUFUS U/F) 32G X 4 MM Does not apply Misc Inject 1 Needle into the skin 4 (four) times daily. For use with insulins 400 each 3    rosuvastatin 20 MG Oral Tab Take 1 tablet (20 mg total) by mouth nightly. 90 tablet 3    cinacalcet 60 MG Oral Tab Take  1 tablet (60 mg total) by mouth.      tacrolimus 1 MG Oral Cap Take by mouth 2 (two) times daily.      mycophenolate sodium 360 MG Oral Tab EC Take 2 tablets (720 mg total) by mouth 2 (two) times daily before meals.         Allergies:  Allergies[1]    Physical Exam  Vitals and nursing note reviewed.   Constitutional:       General: He is not in acute distress.     Appearance: Normal appearance.   Cardiovascular:      Rate and Rhythm: Normal rate and regular rhythm.      Heart sounds: Normal heart sounds.   Pulmonary:      Effort: Pulmonary effort is normal. No respiratory distress.      Breath sounds: Normal breath sounds. No stridor. No wheezing, rhonchi or rales.   Chest:      Chest wall: No tenderness.   Musculoskeletal:      Cervical back: Tenderness present.      Lumbar back: Tenderness present.      Right knee: Decreased range of motion. Tenderness present.      Right lower leg: Laceration and tenderness present.   Neurological:      Mental Status: He is alert and oriented to person, place, and time.          Assessment and Plan:   Problem List Items Addressed This Visit    None  Visit Diagnoses       Partial thickness burn of left forearm, initial encounter    -  Primary    Relevant Orders    XR KNEE, COMPLETE (4 OR MORE VIEWS), RIGHT (CPT=73564)    Status post fall        Relevant Orders    XR CERVICAL SPINE (4VIEWS) (CPT=72050)    XR LUMBAR SPINE (MIN 4 VIEWS) (CPT=72110)    Acute pain of right knee        Relevant Orders    XR KNEE, COMPLETE (4 OR MORE VIEWS), RIGHT (CPT=73564)    Acute right-sided low back pain with right-sided sciatica        Relevant Orders    XR LUMBAR SPINE (MIN 4 VIEWS) (CPT=72110)    Neck pain        Relevant Orders    XR CERVICAL SPINE (4VIEWS) (CPT=72050)            X-rays of cervical spine, lumbar spine, right knee.  Neosporin to burn right forearm twice daily, wash twice daily with warm soap and water.  Supportive care discussed.      Follow-up as needed.  Discussed plan of care  with patient and patient is in agreement.  All questions answered. Patient to call with questions or concerns.    Encouraged to sign up for My Chart if not already registered.        [1] No Known Allergies

## 2025-02-09 NOTE — TELEPHONE ENCOUNTER
Patient was seen during office visit with HANNY Jasmine on 2/8/25.    No further action needed at this time.     Closing this encounter.

## 2025-02-10 ENCOUNTER — TELEPHONE (OUTPATIENT)
Dept: FAMILY MEDICINE CLINIC | Facility: CLINIC | Age: 55
End: 2025-02-10

## 2025-02-10 NOTE — TELEPHONE ENCOUNTER
With [facility]  Zach   ID # [id number]  Patient calling ( name and date of birth of patient verified ) asking about his ANGELITO/ disability parking placard and  x-rays         ADO- Staff-  please follow up on ANGELITO and  Parking placard          Explained the following about the x-rays   Test results now post immediately to your [employer] account.  However, your doctor may not have the chance to review or provide comments on them before the results reach you.    Our providers review and comment on all test results, normal or otherwise.   If you have not heard from our office with comments on your test results within the next 3-4 days, please contact us again on this message string so we can assist you.     Patient verbalizes understanding and agrees with plan.

## 2025-02-10 NOTE — TELEPHONE ENCOUNTER
Per MD pt needs an appt and must bring previous copy of last form as to why he needs placard form.

## 2025-02-12 RX ORDER — TIRZEPATIDE 7.5 MG/.5ML
7.5 INJECTION, SOLUTION SUBCUTANEOUS
Qty: 2 ML | Refills: 0 | Status: SHIPPED | OUTPATIENT
Start: 2025-02-12 | End: 2025-02-13

## 2025-02-12 NOTE — TELEPHONE ENCOUNTER
Please review. Protocol Failed; No Protocol    Requested Prescriptions   Pending Prescriptions Disp Refills    Tirzepatide (MOUNJARO) 7.5 MG/0.5ML Subcutaneous Solution Auto-injector  0     Sig: Inject 7.5 mg into the skin.       Diabetes Medication Protocol Failed - 2/12/2025  7:43 AM        Failed - Last A1C < 7.5 and within past 6 months     Lab Results   Component Value Date    A1C 7.7 (H) 07/27/2024             Failed - Medication is active on med list        Passed - In person appointment or virtual visit in the past 6 mos or appointment in next 3 mos     Recent Outpatient Visits              4 days ago Partial thickness burn of left forearm, initial encounter    [facility] [place] [facility] [facility] Joanna, APRN    Office Visit    4 months ago Type 2 diabetes mellitus with diabetic nephropathy, without long-term current use of insulin ([facility]    Office Visit    6 months ago Medicare annual wellness visit, initial    [facility]    Office Visit    10 months ago Type 2 diabetes mellitus with diabetic nephropathy, without long-term current use of insulin ([facility]    Office Visit    1 year ago Type 2 diabetes mellitus with diabetic nephropathy, without long-term current use of insulin ([facility]    Office Visit          Future Appointments         Provider Department Appt Notes    Tomorrow Edgar Landis MD [facility] [place] [address] [place] form to complete see TE on file                    Passed - Microalbumin procedure in past 12 months or taking ACE/ARB        Passed - EGFRCR or GFRNAA > 50     GFR Evaluation  EGFRCR: 87 , resulted on 7/27/2024          Passed - GFR in the past 12 months                Future Appointments         Provider Department Appt Notes    Tomorrow Edgar Landis MD [facility] [place] form to complete see TE on file          Recent Outpatient Visits              4 days ago Partial thickness burn of left forearm, initial encounter    [facility] [place] [facility] Joanna, APRN    Office Visit    4 months ago Type 2 diabetes mellitus with diabetic nephropathy, without long-term current use of insulin (HCC)    [facility]    Office Visit    6 months ago Medicare annual wellness visit, initial    [facility]    Office Visit    10 months ago Type 2 diabetes mellitus with diabetic nephropathy, without long-term current use of insulin ([facility]CC)    [facility]    Office Visit    1 year ago Type 2 diabetes mellitus with diabetic nephropathy, without long-term current use of insulin (HCC)    [facility]    [facility] Visit

## 2025-02-13 ENCOUNTER — OFFICE VISIT (OUTPATIENT)
Dept: FAMILY MEDICINE CLINIC | Facility: CLINIC | Age: 55
End: 2025-02-13
Payer: MEDICARE

## 2025-02-13 ENCOUNTER — LAB ENCOUNTER (OUTPATIENT)
Dept: LAB | Age: 55
End: 2025-02-13
Attending: FAMILY MEDICINE
Payer: MEDICARE

## 2025-02-13 VITALS
DIASTOLIC BLOOD PRESSURE: 68 MMHG | BODY MASS INDEX: 31.09 KG/M2 | HEART RATE: 69 BPM | WEIGHT: 217.19 LBS | SYSTOLIC BLOOD PRESSURE: 125 MMHG | HEIGHT: 70 IN

## 2025-02-13 DIAGNOSIS — E11.21 TYPE 2 DIABETES MELLITUS WITH DIABETIC NEPHROPATHY, WITHOUT LONG-TERM CURRENT USE OF INSULIN (HCC): ICD-10-CM

## 2025-02-13 DIAGNOSIS — I10 ESSENTIAL HYPERTENSION: ICD-10-CM

## 2025-02-13 DIAGNOSIS — E11.21 TYPE 2 DIABETES MELLITUS WITH DIABETIC NEPHROPATHY, WITHOUT LONG-TERM CURRENT USE OF INSULIN (HCC): Primary | ICD-10-CM

## 2025-02-13 DIAGNOSIS — Z94.0 RENAL TRANSPLANT RECIPIENT (HCC): ICD-10-CM

## 2025-02-13 LAB
CREAT UR-SCNC: 47.2 MG/DL
HEMOGLOBIN A1C: 6.9 % (ref 4.3–5.6)
MICROALBUMIN UR-MCNC: <0.3 MG/DL

## 2025-02-13 PROCEDURE — 82043 UR ALBUMIN QUANTITATIVE: CPT

## 2025-02-13 PROCEDURE — 3072F LOW RISK FOR RETINOPATHY: CPT | Performed by: FAMILY MEDICINE

## 2025-02-13 PROCEDURE — 3078F DIAST BP <80 MM HG: CPT | Performed by: FAMILY MEDICINE

## 2025-02-13 PROCEDURE — 83036 HEMOGLOBIN GLYCOSYLATED A1C: CPT | Performed by: FAMILY MEDICINE

## 2025-02-13 PROCEDURE — 3008F BODY MASS INDEX DOCD: CPT | Performed by: FAMILY MEDICINE

## 2025-02-13 PROCEDURE — 82570 ASSAY OF URINE CREATININE: CPT

## 2025-02-13 PROCEDURE — 99214 OFFICE O/P EST MOD 30 MIN: CPT | Performed by: FAMILY MEDICINE

## 2025-02-13 PROCEDURE — 3074F SYST BP LT 130 MM HG: CPT | Performed by: FAMILY MEDICINE

## 2025-02-13 PROCEDURE — 3044F HG A1C LEVEL LT 7.0%: CPT | Performed by: FAMILY MEDICINE

## 2025-02-13 RX ORDER — ROSUVASTATIN CALCIUM 20 MG/1
20 TABLET, COATED ORAL NIGHTLY
Qty: 90 TABLET | Refills: 3 | Status: SHIPPED | OUTPATIENT
Start: 2025-02-13

## 2025-02-13 RX ORDER — AMLODIPINE BESYLATE 10 MG/1
10 TABLET ORAL DAILY
Qty: 90 TABLET | Refills: 3 | Status: SHIPPED | OUTPATIENT
Start: 2025-02-13

## 2025-02-13 RX ORDER — TIRZEPATIDE 7.5 MG/.5ML
7.5 INJECTION, SOLUTION SUBCUTANEOUS
Qty: 2 ML | Refills: 0 | Status: SHIPPED | OUTPATIENT
Start: 2025-02-13

## 2025-02-13 RX ORDER — HYDROCHLOROTHIAZIDE 12.5 MG/1
1 CAPSULE ORAL
Qty: 6 EACH | Refills: 3 | Status: SHIPPED | OUTPATIENT
Start: 2025-02-13

## 2025-02-13 RX ORDER — DAPAGLIFLOZIN 10 MG/1
10 TABLET, FILM COATED ORAL DAILY
Qty: 90 TABLET | Refills: 2 | Status: SHIPPED | OUTPATIENT
Start: 2025-02-13

## 2025-02-13 RX ORDER — CARVEDILOL 3.12 MG/1
3.12 TABLET ORAL 2 TIMES DAILY WITH MEALS
Qty: 180 TABLET | Refills: 1 | Status: SHIPPED | OUTPATIENT
Start: 2025-02-13

## 2025-02-13 RX ORDER — FLASH GLUCOSE SENSOR
KIT MISCELLANEOUS
Qty: 6 EACH | Refills: 3 | Status: SHIPPED | OUTPATIENT
Start: 2025-02-13

## 2025-02-13 RX ORDER — INSULIN DEGLUDEC 200 U/ML
38 INJECTION, SOLUTION SUBCUTANEOUS DAILY
Qty: 21 ML | Refills: 3 | Status: SHIPPED | OUTPATIENT
Start: 2025-02-13

## 2025-02-13 RX ORDER — INSULIN LISPRO 200 [IU]/ML
12 INJECTION, SOLUTION SUBCUTANEOUS
Qty: 18 ML | Refills: 3 | Status: SHIPPED | OUTPATIENT
Start: 2025-02-13

## 2025-02-13 NOTE — PROGRESS NOTES
2/13/2025  1:54 PM    John Avery is a 55 year old male.    Chief complaint(s):   Chief Complaint   Patient presents with    Worker's Comp     Pt states Workers Comp case     Test Results     X-ray results from 02/08/2025     HPI:     John Avery primary complaint is regarding DM, HTN.       John Brandt is a 54 yrs old male who has type 2, insulin requiring diabetes. Compliance with treatment has been fair.  Patient's diabetes was first diagnosed 2015.  Patient follows a 2000 calorie ADA diet.  Patient report experiencing the following diabetes related symptoms; Negative for polyuria, Negative for polydipsia, Negative for blurred vision.  Depression symptoms include none.  Tobacco screen: none  smoker.  Recently placed on prednisone 5 mg due to renal transplant.  Current meds include :  oral hypoglycemic include: Tradjenta 5 mg, Farxiga 10 mg  insulin/injectable : Tresiba 38 units at bedtime.   Mounjaro 7.5 mg Q week. Hypoglycemia severity is not applicable. He reports home blood glucose readings have been 112 (#s) and believes having fair glucose control.  Most recent lab results include glycohemoglobin 7.7%, microalbuminuria have been Cr 0.96, + GFR 93 s/p renal transplant.  In regard to preventative care, his last ophthalmology exam was in 1 months ago.  Opthalmic evaluation have shown - pathology.  Concurrent relative health problems include HTN/ CRF. .     John Brandtis a 55 year old male presents for follow up regarding hypertension / CRI s/p renal transplant. S/p renal transplant 08/11/23. This was first diagnosed more than 5 years ago.  Current nonpharmacologic treatment includes low sodium diet, exercise, and meditation.  His current cardiac medication(s) regimen includes: Carvedilol 3.125, Amlodipine 10 mg.  He has kept a blood pressure diary, and states that his blood pressures have been well controll.  He is tolerating his medication(s)  well without side effects.     Also, patient  recently sustained a injury while at work complaining of neck pain, lower lumbar pain as well as right tibial pain.  X-rays were done and all the sites were essentially normal.  It is a small cut on the anterior tibia on the lright lower leg.     HISTORY:  Past Medical History:    Chicken pox    Dialysis patient    [facility] rt chest catheter    High blood pressure    High cholesterol    Other and unspecified hyperlipidemia    Renal disorder    Type II or unspecified type diabetes mellitus without mention of complication, not stated as uncontrolled    Unspecified essential hypertension    Visual impairment      Past Surgical History:   Procedure Laterality Date    Hernia surgery  05/12/2015    repair of small umbilical hernia; excisin of umbilical pilonidal cyst    Other surgical history  05/2017    belly button surgery      Family History   Problem Relation Age of Onset    Diabetes Father         type 2    Hypertension Father     Diabetes Maternal Grandmother         type 2    Prostate Cancer Maternal Grandfather     No Known Problems Sister     No Known Problems Sister       Social History:   Social History     Socioeconomic History    Marital status:     Number of children: 2   Occupational History    Occupation:    Tobacco Use    Smoking status: Former     Current packs/day: 0.00     Types: Cigarettes     Quit date: 11/1/2014     Years since quitting: 10.2    Smokeless tobacco: Never   Vaping Use    Vaping status: Never Used   Substance and Sexual Activity    Alcohol use: Yes     Alcohol/week: 2.0 standard drinks of alcohol     Types: 2 Shots of liquor per week     Comment: OCC    Drug use: Never   Other Topics Concern    Caffeine Concern Yes     Comment: chocolate-1 cup     Social Drivers of Health     Food Insecurity: No Food Insecurity (8/8/2024)    Received from [facility]    Hunger Vital Sign     Worried About Running Out of Food in the Last Year: Never true     Ran Out of  Food in the Last Year: Never true   Transportation Needs: No Transportation Needs (8/8/2024)    Received from [facility]    PRAPARE - Transportation     Lack of Transportation (Medical): No     Lack of Transportation (Non-Medical): No   Housing Stability: Unknown (8/8/2024)    Received from [facility]    Housing Stability Vital Sign     Unable to Pay for Housing in the Last Year: No     Unstable Housing in the Last Year: No        Immunizations:   Immunization History   Administered Date(s) Administered    Covid-19 Vaccine Pfizer 30 mcg/0.3 ml 03/14/2021, 04/11/2021, 08/19/2021    Covid-19 Vaccine Pfizer Bivalent 30mcg/0.3mL 06/17/2023    Covid-19 Vaccine Pfizer Jonathon-Sucrose 30 mcg/0.3 ml 07/19/2022    FLU VAC QIV SPLIT 3 YRS AND OLDER (70751) 09/23/2020    FLULAVAL 6 months & older 0.5 ml Prefilled syringe (90578) 11/15/2019    FLUZONE 6 months and older PFS 0.5 ml (43733) 12/27/2016, 10/01/2021, 10/22/2021, 09/19/2022    Fluvirin, 3 Years & >, Im 12/01/2019    Influenza 09/23/2020    Influenza Vaccine, trivalent (IIV3), PF 0.5mL (46869) 09/23/2020    Influenza Virus Vaccine, Split 12/01/2019    Pfizer Covid-19 Vaccine 30mcg/0.3ml 12yrs+ 05/01/2024    Pneumococcal (Prevnar 13) 12/19/2016    TDAP 03/20/2015    Zoster Vaccine Recombinant Adjuvanted (Shingrix) 06/17/2023   Deferred Date(s) Deferred    FLUZONE 6 months and older PFS 0.5 ml (70316) 12/19/2016       Medications (Active prior to today's visit):  Current Outpatient Medications   Medication Sig Dispense Refill    amLODIPine 10 MG Oral Tab Take 1 tablet (10 mg total) by mouth daily. 90 tablet 3    carvedilol 3.125 MG Oral Tab Take 1 tablet (3.125 mg total) by mouth 2 (two) times daily with meals. 180 tablet 1    Continuous Glucose Sensor (FREESTYLE ELKIN 14 DAY SENSOR) Does not apply Misc USE FOUR TIMES DAILY AND CHANGE EVERY 14 DAYS 6 each 3    Continuous Glucose Sensor (FREESTYLE ELKIN 3 PLUS SENSOR) Does not  apply Misc 1 each every 14 (fourteen) days. USE FOUR TIMES DAILY AND CHANGE EVERY 14 DAYS. 6 each 3    dapagliflozin (FARXIGA) 10 MG Oral Tab Take 1 tablet (10 mg total) by mouth daily. 90 tablet 2    insulin degludec (TRESIBA FLEXTOUCH) 200 UNIT/ML Subcutaneous Solution Pen-injector Inject 38 Units into the skin daily. 21 mL 3    Insulin Lispro (HUMALOG KWIKPEN) 200 UNIT/ML Subcutaneous Solution Pen-injector Inject 12 Units into the skin 3 (three) times daily with meals. 150-185 3 units, 186-200 5 units, 201-250 7 units, 251-300 10 units, >301 12 units. Inject 3 (three) times daily with meals 18 mL 3    rosuvastatin 20 MG Oral Tab Take 1 tablet (20 mg total) by mouth nightly. 90 tablet 3    Tirzepatide (MOUNJARO) 7.5 MG/0.5ML Subcutaneous Solution Auto-injector Inject 7.5 mg into the skin every 7 days. 2 mL 0    Blood Pressure Does not apply Kit Use q day 1 kit 0    ergocalciferol 1.25 MG (74095 UT) Oral Cap Take 1 capsule (50,000 Units total) by mouth once a week. 12 capsule 3    Insulin Pen Needle (BD PEN NEEDLE RUFUS 2ND GEN) 32G X 4 MM Does not apply Misc Take 1 each by mouth daily. 100 each 6    Insulin Pen Needle (BD PEN NEEDLE RUFUS U/F) 32G X 4 MM Does not apply Misc Inject 1 Needle into the skin 4 (four) times daily. For use with insulins 400 each 3    cinacalcet 60 MG Oral Tab Take 1 tablet (60 mg total) by mouth.      tacrolimus 1 MG Oral Cap Take by mouth 2 (two) times daily.      mycophenolate sodium 360 MG Oral Tab EC Take 2 tablets (720 mg total) by mouth 2 (two) times daily before meals.         Allergies:  Allergies[1]      ROS:   Review of Systems   Constitutional:  Negative for appetite change, fatigue and fever.   Respiratory:  Negative for shortness of breath.    Cardiovascular:  Negative for chest pain.   Gastrointestinal:  Negative for abdominal pain.   Endocrine: Negative for polydipsia and polyuria.   Musculoskeletal:  Negative for myalgias.        Right leg pains, neck pains and lumbar pain    Skin:  Negative for rash.   Neurological:  Negative for dizziness, weakness and headaches.       PHYSICAL EXAM:   VS: /68   Pulse 69   Ht 5' 10\" (1.778 m)   Wt 217 lb 3.2 oz (98.5 kg)   BMI 31.16 kg/m²     Physical Exam  Vitals reviewed.   Constitutional:       Appearance: Normal appearance. He is well-developed.   HENT:      Head: Normocephalic.   Eyes:      General: No scleral icterus.     Conjunctiva/sclera: Conjunctivae normal.   Cardiovascular:      Rate and Rhythm: Normal rate.   Pulmonary:      Effort: Pulmonary effort is normal.   Musculoskeletal:      Cervical back: Neck supple. No pain with movement.      Comments: Right anterior tibia small cut   Feet:      Right foot:      Protective Sensation: 10 sites tested.  10 sites sensed.      Left foot:      Protective Sensation: 10 sites tested.  10 sites sensed.   Lymphadenopathy:      Comments: LEs no edema    Skin:     Findings: No rash.   Psychiatric:         Mood and Affect: Mood normal.         LABORATORY RESULTS:   No results found for: \"URCOLOR\", \"URCLA\", \"URINELEUK\", \"URINENITRITE\", \"URINEBLOOD\"   Results for orders placed or performed in visit on 02/13/25   POC Glycohemoglobin [98509]    Collection Time: 02/13/25  2:19 PM   Result Value Ref Range    HEMOGLOBIN A1C 6.9 (A) 4.3 - 5.6 %    Cartridge Lot# [id number] Numeric    Cartridge Expiration Date 10/11/26 Date       EKG / Spirometry : -     Radiology:     ASSESSMENT/PLAN:   Assessment   Encounter Diagnoses   Name Primary?    Type 2 diabetes mellitus with diabetic nephropathy, without long-term current use of insulin ([facility]) Yes    Essential hypertension     Renal transplant recipient ([facility])      1. Type 2 diabetes mellitus with diabetic nephropathy, without long-term current use of insulin ([facility])    DIABETES A&P    LABORATORY & ORDERS: Blood test(s) ordered today ;   Orders Placed This Encounter   Procedures    Microalb/Creat Ratio, Random Urine    POC Glycohemoglobin [21595]     Additional  orders include:   MEDICATIONS:    amLODIPine 10 MG Oral Tab, Take 1 tablet (10 mg total) by mouth daily., Disp: 90 tablet, Rfl: 3    carvedilol 3.125 MG Oral Tab, Take 1 tablet (3.125 mg total) by mouth 2 (two) times daily with meals., Disp: 180 tablet, Rfl: 1    Continuous Glucose Sensor (FREESTYLE ELKIN 14 DAY SENSOR) Does not apply Misc, USE FOUR TIMES DAILY AND CHANGE EVERY 14 DAYS, Disp: 6 each, Rfl: 3    Continuous Glucose Sensor (FREESTYLE ELKIN 3 PLUS SENSOR) Does not apply Misc, 1 each every 14 (fourteen) days. USE FOUR TIMES DAILY AND CHANGE EVERY 14 DAYS., Disp: 6 each, Rfl: 3    dapagliflozin (FARXIGA) 10 MG Oral Tab, Take 1 tablet (10 mg total) by mouth daily., Disp: 90 tablet, Rfl: 2    insulin degludec (TRESIBA FLEXTOUCH) 200 UNIT/ML Subcutaneous Solution Pen-injector, Inject 38 Units into the skin daily., Disp: 21 mL, Rfl: 3    Insulin Lispro (HUMALOG KWIKPEN) 200 UNIT/ML Subcutaneous Solution Pen-injector, Inject 12 Units into the skin 3 (three) times daily with meals. 150-185 3 units, 186-200 5 units, 201-250 7 units, 251-300 10 units, >301 12 units. Inject 3 (three) times daily with meals, Disp: 18 mL, Rfl: 3    rosuvastatin 20 MG Oral Tab, Take 1 tablet (20 mg total) by mouth nightly., Disp: 90 tablet, Rfl: 3    Tirzepatide (MOUNJARO) 7.5 MG/0.5ML Subcutaneous Solution Auto-injector, Inject 7.5 mg into the skin every 7 days., Disp: 2 mL, Rfl: 0    Blood Pressure Does not apply Kit, Use q day, Disp: 1 kit, Rfl: 0    ergocalciferol 1.25 MG (79432 UT) Oral Cap, Take 1 capsule (50,000 Units total) by mouth once a week., Disp: 12 capsule, Rfl: 3    Insulin Pen Needle (BD PEN NEEDLE RUFUS 2ND GEN) 32G X 4 MM Does not apply Misc, Take 1 each by mouth daily., Disp: 100 each, Rfl: 6    Insulin Pen Needle (BD PEN NEEDLE RUFUS U/F) 32G X 4 MM Does not apply Misc, Inject 1 Needle into the skin 4 (four) times daily. For use with insulins, Disp: 400 each, Rfl: 3    cinacalcet 60 MG Oral Tab, Take 1 tablet (60 mg  total) by mouth., Disp: , Rfl:     tacrolimus 1 MG Oral Cap, Take by mouth 2 (two) times daily., Disp: , Rfl:     mycophenolate sodium 360 MG Oral Tab EC, Take 2 tablets (720 mg total) by mouth 2 (two) times daily before meals., Disp: , Rfl: .  Requested Prescriptions     Signed Prescriptions Disp Refills    amLODIPine 10 MG Oral Tab 90 tablet 3     Sig: Take 1 tablet (10 mg total) by mouth daily.    carvedilol 3.125 MG Oral Tab 180 tablet 1     Sig: Take 1 tablet (3.125 mg total) by mouth 2 (two) times daily with meals.    Continuous Glucose Sensor (FREESTYLE ELKIN 14 DAY SENSOR) Does not apply Misc 6 each 3     Sig: USE FOUR TIMES DAILY AND CHANGE EVERY 14 DAYS    Continuous Glucose Sensor (FREESTYLE ELKIN 3 PLUS SENSOR) Does not apply Misc 6 each 3     Si each every 14 (fourteen) days. USE FOUR TIMES DAILY AND CHANGE EVERY 14 DAYS.    dapagliflozin (FARXIGA) 10 MG Oral Tab 90 tablet 2     Sig: Take 1 tablet (10 mg total) by mouth daily.    insulin degludec (TRESIBA FLEXTOUCH) 200 UNIT/ML Subcutaneous Solution Pen-injector 21 mL 3     Sig: Inject 38 Units into the skin daily.    Insulin Lispro (HUMALOG KWIKPEN) 200 UNIT/ML Subcutaneous Solution Pen-injector 18 mL 3     Sig: Inject 12 Units into the skin 3 (three) times daily with meals. 150-185 3 units, 186-200 5 units, 201-250 7 units, 251-300 10 units, >301 12 units. Inject 3 (three) times daily with meals    rosuvastatin 20 MG Oral Tab 90 tablet 3     Sig: Take 1 tablet (20 mg total) by mouth nightly.    Tirzepatide (MOUNJARO) 7.5 MG/0.5ML Subcutaneous Solution Auto-injector 2 mL 0     Sig: Inject 7.5 mg into the skin every 7 days.      REFERRALS:       Procedures    Microalb/Creat Ratio, Random Urine    POC Glycohemoglobin [28516]     RECOMMENDATIONS: instructed in use of glucometer ( check fasting glucose multiple times a day), return for training in administering insulin injections, adherence to an 1800 calorie ADA diet,  5 pound weight loss, a graduated  exercise program, HgbA1C level checked quarterly, daily foot self-inspection, need for yearly flu shots, and avoid all sodas, juices, candy, chocolates, cakes, ice cream, etc.      FOLLOW-UP: Schedule a follow-up visit in 6 months.           2. Essential hypertension  3. Renal transplant recipient ([facility])    Doing well    MEDICATIONS:   Requested Prescriptions     Signed Prescriptions Disp Refills    amLODIPine 10 MG Oral Tab 90 tablet 3     Sig: Take 1 tablet (10 mg total) by mouth daily.    carvedilol 3.125 MG Oral Tab 180 tablet 1     Sig: Take 1 tablet (3.125 mg total) by mouth 2 (two) times daily with meals.    Continuous Glucose Sensor (FREESTYLE ELKIN 14 DAY SENSOR) Does not apply Misc 6 each 3     Sig: USE FOUR TIMES DAILY AND CHANGE EVERY 14 DAYS    Continuous Glucose Sensor (FREESTYLE ELKIN 3 PLUS SENSOR) Does not apply Misc 6 each 3     Si each every 14 (fourteen) days. USE FOUR TIMES DAILY AND CHANGE EVERY 14 DAYS.    dapagliflozin (FARXIGA) 10 MG Oral Tab 90 tablet 2     Sig: Take 1 tablet (10 mg total) by mouth daily.    insulin degludec (TRESIBA FLEXTOUCH) 200 UNIT/ML Subcutaneous Solution Pen-injector 21 mL 3     Sig: Inject 38 Units into the skin daily.    Insulin Lispro (HUMALOG KWIKPEN) 200 UNIT/ML Subcutaneous Solution Pen-injector 18 mL 3     Sig: Inject 12 Units into the skin 3 (three) times daily with meals. 150-185 3 units, 186-200 5 units, 201-250 7 units, 251-300 10 units, >301 12 units. Inject 3 (three) times daily with meals    rosuvastatin 20 MG Oral Tab 90 tablet 3     Sig: Take 1 tablet (20 mg total) by mouth nightly.    Tirzepatide (MOUNJARO) 7.5 MG/0.5ML Subcutaneous Solution Auto-injector 2 mL 0     Sig: Inject 7.5 mg into the skin every 7 days.      LABORATORY & ORDERS:   Orders Placed This Encounter   Procedures    Microalb/Creat Ratio, Random Urine    POC Glycohemoglobin [24016]     RECOMMENDATIONS given include: avoid pseudoephedrine or other stimulants/decongestants in common  cold remedies, decrease consumption of alcohol, perform routine monitoring of blood pressure with home blood pressure cuff, exercise, reduction of dietary salt intake, take medication as prescribed, try not to miss doses, smoking cessation, weight loss, and stress reduction.    FOLLOW-UP: Schedule a follow-up visit in 6 months.               Orders This Visit:  Orders Placed This Encounter   Procedures    Microalb/Creat Ratio, Random Urine    POC Glycohemoglobin [[id number]]       Meds This Visit:  Requested Prescriptions     Signed Prescriptions Disp Refills    amLODIPine 10 MG Oral Tab 90 tablet 3     Sig: Take 1 tablet (10 mg total) by mouth daily.    carvedilol 3.125 MG Oral Tab 180 tablet 1     Sig: Take 1 tablet (3.125 mg total) by mouth 2 (two) times daily with meals.    Continuous Glucose Sensor (FREESTYLE ELKIN 14 DAY SENSOR) Does not apply Misc 6 each 3     Sig: USE FOUR TIMES DAILY AND CHANGE EVERY 14 DAYS    Continuous Glucose Sensor (FREESTYLE ELKIN 3 PLUS SENSOR) Does not apply Misc 6 each 3     Si each every 14 (fourteen) days. USE FOUR TIMES DAILY AND CHANGE EVERY 14 DAYS.    dapagliflozin (FARXIGA) 10 MG Oral Tab 90 tablet 2     Sig: Take 1 tablet (10 mg total) by mouth daily.    insulin degludec (TRESIBA FLEXTOUCH) 200 UNIT/ML Subcutaneous Solution Pen-injector 21 mL 3     Sig: Inject 38 Units into the skin daily.    Insulin Lispro (HUMALOG KWIKPEN) 200 UNIT/ML Subcutaneous Solution Pen-injector 18 mL 3     Sig: Inject 12 Units into the skin 3 (three) times daily with meals. 150-185 3 units, 186-200 5 units, 201-250 7 units, 251-300 10 units, >301 12 units. Inject 3 (three) times daily with meals    rosuvastatin 20 MG Oral Tab 90 tablet 3     Sig: Take 1 tablet (20 mg total) by mouth nightly.    Tirzepatide (MOUNJARO) 7.5 MG/0.5ML Subcutaneous Solution Auto-injector 2 mL 0     Sig: Inject 7.5 mg into the skin every 7 days.       Imaging & Referrals:  None         SCHUYLER TAM MD         [1] No  Known Allergies

## 2025-02-14 ENCOUNTER — TELEPHONE (OUTPATIENT)
Dept: FAMILY MEDICINE CLINIC | Facility: CLINIC | Age: 55
End: 2025-02-14

## 2025-02-14 DIAGNOSIS — M47.812 OSTEOARTHRITIS OF CERVICAL SPINE, UNSPECIFIED SPINAL OSTEOARTHRITIS COMPLICATION STATUS: ICD-10-CM

## 2025-02-14 DIAGNOSIS — M25.561 RIGHT KNEE PAIN, UNSPECIFIED CHRONICITY: Primary | ICD-10-CM

## 2025-02-14 DIAGNOSIS — M54.50 LUMBAR BACK PAIN: ICD-10-CM

## 2025-02-14 NOTE — TELEPHONE ENCOUNTER
Patient would like to proceed with physical therapy. Pended for review.     Patient would also like to know if he should have a a letter for work stating he can not lift (he would like for you to designate if he should not lift at all or if he can lift up to a certain weight with this injury). Pended for review under tanvi Lopez,     There is a slight slipped disc on level C5/C6 and is described as degenerative.  Your x-ray also shows mild arthritis.  It is otherwise normal.  If you continue to have pain in the neck, I recommend following up for physical therapy for assessment and treatment.     HANNY Masterson, FNP-BC   Written by HANNY Ordoñez on 2/12/2025  4:21 PM CST  Seen by patient John Avery on 2/13/2025  4:     John,     Your x-ray of the lumbar spine shows some arthritis and is otherwise normal.  If you continue to have pain, I recommend physical therapy for assessment and treatment.     HANNY Masterson, FNP-BC   Written by HANNY Ordoñez on 2/12/2025  4:22 PM CST  Seen by patient John Avery on 2/13/2025  4:21 PM     John,     Your knee x-ray shows a small buildup of fluid as well as pseudogout.  If you are having continued pain, I recommend following up with physical therapy for assessment and treatment.     HANNY Masterson, FNP-BC   Written by HANNY Ordoñez on 2/12/2025  4:24 PM CST  Seen by patient John Avery on 2/13/2025  4:21 PM

## 2025-02-15 NOTE — TELEPHONE ENCOUNTER
Please inform patient that restrictions are not something that I would put into place.  I did place an order for him to follow-up with physical therapy for assessment and treatment of his pain.  I also placed an order for physiatry for him to follow-up with for assessment and treatment of his pain.  If they think that lifting restrictions are necessary and warranted, they can write a letter for him after assessment.  Thank you.

## 2025-02-25 NOTE — TELEPHONE ENCOUNTER
Current Outpatient Medications   Medication Sig Dispense Refill      Tirzepatide (MOUNJARO) 7.5 MG/0.5ML Subcutaneous Solution Auto-injector Inject 7.5 mg into the skin every 7 days. 2 mL 0     Refill requested.

## 2025-02-28 RX ORDER — TIRZEPATIDE 7.5 MG/.5ML
7.5 INJECTION, SOLUTION SUBCUTANEOUS
Qty: 2 ML | Refills: 0 | Status: SHIPPED | OUTPATIENT
Start: 2025-02-28

## 2025-03-05 ENCOUNTER — TELEPHONE (OUTPATIENT)
Dept: FAMILY MEDICINE CLINIC | Facility: CLINIC | Age: 55
End: 2025-03-05

## 2025-03-05 NOTE — TELEPHONE ENCOUNTER
[facility] called, patient having trouble paying for mycophenolic for history of kidney transplant.  There is a patient assistance program.  This RN advised them to call Dr Torres's office at [facility] where he is a patient.      Elvia Torres DO   [address] [place] [address]   Phone: [phone]   Fax: [phone]

## 2025-03-21 PROBLEM — I13.10 HYPERTENSIVE HEART AND KIDNEY DISEASE: Status: ACTIVE | Noted: 2025-03-21

## 2025-03-21 PROBLEM — N18.6 END-STAGE RENAL FAILURE WITH RENAL TRANSPLANT  (CMD): Status: ACTIVE | Noted: 2025-03-21

## 2025-03-21 PROBLEM — E21.1 SECONDARY HYPERPARATHYROIDISM, NOT ELSEWHERE CLASSIFIED (CMD): Status: ACTIVE | Noted: 2025-02-03

## 2025-03-21 PROBLEM — E08.22 DIABETES MELLITUS DUE TO UNDERLYING CONDITION WITH CHRONIC KIDNEY DISEASE ON CHRONIC DIALYSIS, WITH LONG-TERM CURRENT USE OF INSULIN  (CMD): Status: ACTIVE | Noted: 2025-03-21

## 2025-03-21 PROBLEM — N18.9 ANEMIA OF CHRONIC RENAL FAILURE: Status: ACTIVE | Noted: 2025-02-03

## 2025-03-21 PROBLEM — E78.5 HYPERLIPIDEMIA: Status: ACTIVE | Noted: 2021-11-09

## 2025-03-21 PROBLEM — E55.9 VITAMIN D DEFICIENCY: Status: ACTIVE | Noted: 2025-02-03

## 2025-03-21 PROBLEM — D63.1 ANEMIA OF CHRONIC RENAL FAILURE: Status: ACTIVE | Noted: 2025-02-03

## 2025-03-21 PROBLEM — G20.A1 PARKINSON'S DISEASE (CMD): Status: ACTIVE | Noted: 2023-06-17

## 2025-03-21 PROBLEM — T86.19 END-STAGE RENAL FAILURE WITH RENAL TRANSPLANT  (CMD): Status: ACTIVE | Noted: 2025-03-21

## 2025-03-21 PROBLEM — Z99.2 DIABETES MELLITUS DUE TO UNDERLYING CONDITION WITH CHRONIC KIDNEY DISEASE ON CHRONIC DIALYSIS, WITH LONG-TERM CURRENT USE OF INSULIN  (CMD): Status: ACTIVE | Noted: 2025-03-21

## 2025-03-21 PROBLEM — N18.6 DIABETES MELLITUS DUE TO UNDERLYING CONDITION WITH CHRONIC KIDNEY DISEASE ON CHRONIC DIALYSIS, WITH LONG-TERM CURRENT USE OF INSULIN  (CMD): Status: ACTIVE | Noted: 2025-03-21

## 2025-03-21 PROBLEM — Z94.0 HISTORY OF KIDNEY TRANSPLANT (CMD): Status: ACTIVE | Noted: 2025-03-21

## 2025-03-21 PROBLEM — N18.5: Status: ACTIVE | Noted: 2020-05-01

## 2025-03-21 PROBLEM — Z79.4 DIABETES MELLITUS DUE TO UNDERLYING CONDITION WITH CHRONIC KIDNEY DISEASE ON CHRONIC DIALYSIS, WITH LONG-TERM CURRENT USE OF INSULIN  (CMD): Status: ACTIVE | Noted: 2025-03-21

## 2025-05-19 NOTE — MR AVS SNAPSHOT
[address]  [address]  [phone]               Thank you for choosing us for your health care visit with Enrico العلي DPM.  We are glad to serve you and happy to provide yo Did not receive fax, called [facility] today requesting results to be faxed.    AmLODIPine Besylate 10 MG Tabs   Take 1 tablet (10 mg total) by mouth daily. Commonly known as:  NORVASC           clotrimazole-betamethasone 1-0.05 % Crea   Apply 1 Application topically 2 (two) times daily as needed.    Commonly known as:  Josselyn Ramos

## 2025-06-19 ENCOUNTER — HOSPITAL ENCOUNTER (OUTPATIENT)
Dept: EDUCATION SERVICES | Age: 55
Discharge: STILL ADMITTED OR ADMINISTRATIVE | End: 2025-06-19
Attending: INTERNAL MEDICINE

## 2025-06-19 PROCEDURE — G0108 DIAB MANAGE TRN  PER INDIV: HCPCS

## 2025-06-23 ENCOUNTER — APPOINTMENT (OUTPATIENT)
Dept: EDUCATION SERVICES | Age: 55
End: 2025-06-23
Attending: INTERNAL MEDICINE

## 2025-08-28 ENCOUNTER — APPOINTMENT (OUTPATIENT)
Dept: EDUCATION SERVICES | Age: 55
End: 2025-08-28
Attending: INTERNAL MEDICINE

## 2025-09-09 ENCOUNTER — APPOINTMENT (OUTPATIENT)
Age: 55
End: 2025-09-09

## (undated) DIAGNOSIS — E11.21 TYPE 2 DIABETES MELLITUS WITH DIABETIC NEPHROPATHY, WITHOUT LONG-TERM CURRENT USE OF INSULIN (HCC): ICD-10-CM

## (undated) DEVICE — SHOULDER P.A.D II: Brand: DEROYAL

## (undated) DEVICE — CANNULA 7MM TWIST AR-6570

## (undated) DEVICE — NEEDLE SCORPION AR-13995N

## (undated) DEVICE — MEDI-VAC NON-CONDUCTIVE SUCTION TUBING: Brand: CARDINAL HEALTH

## (undated) DEVICE — AMBIENT SUPER TURBOVAC 90 IFS: Brand: COBLATION

## (undated) DEVICE — SUT ETHILON 4-0 PS-2 1667G

## (undated) DEVICE — ENCORE® LATEX ACCLAIM SIZE 8, STERILE LATEX POWDER-FREE SURGICAL GLOVE: Brand: ENCORE

## (undated) DEVICE — TUBING SET, GRAVITY, 4-SPIKE

## (undated) DEVICE — CUTTER ASCP COOLCUT 13CM 4MM

## (undated) DEVICE — SLEEVE CMPR VLCR STRL

## (undated) DEVICE — 3M™ MEDITPORE™ SOFT CLOTH TAPE 6 IN X 10 YD 12 ROLLS/CASE 2966: Brand: 3M™ MEDIPORE™

## (undated) DEVICE — SHOULDER ARTHROSCOPY: Brand: MEDLINE INDUSTRIES, INC.

## (undated) DEVICE — BURR SHVR COOLCUT 13CM 5MM 8

## (undated) DEVICE — SOLUTION  .9 3000ML

## (undated) NOTE — LETTER
6/17/2023              [facility]         To Whom it may concern: This is to certify that Kwasi Mauro had an appointment on 6/17/2023 at 8:47 AM with Zoe Stuart MD.  Please allow patient to have his cellular phone with him at all time due to his diabetes and renal insuffiencey.       Sincerely,       Zoe Stuart MD  [facility], [address], [address]  [phone]        Document electronically generated by:  Zoe Stuart MD

## (undated) NOTE — LETTER
[address], [place]  Authorization for Invasive Procedures  1. I hereby authorize Dr. Danna Thompson , my physician and whomever may be designated as the doctor's assistant, to perform the following operation and/or procedure:   Tunnel following are some, but not all, of the potential risks that can occur: fever and allergic reactions, hemolytic reactions, transmission of disease such as hepatitis, AIDS, cytomegalovirus (CMV), and flluid overload.  In the event that I wish to have autolog administration of sedation/analgesia as may be necessary or desirable in the judgment of my physician.      Signature of Patient:  ________________________________________________ Date: _________Time: _________    Responsible person in case of minor or unco

## (undated) NOTE — LETTER
[facility], [place]  Authorization for Invasive Procedures  1. I hereby authorize Dr. Sloane Goodman , my physician and whomever may be designated as the doctor's assistant, to perform the following operation and/or procedure:   Tunneled D performed for the purposes of advancing medicine, science, and/or education, provided my identity is not revealed. If the procedure has been videotaped, the physician/surgeon will obtain the original videotape.  The hospital will not be responsible for stor My signature below affirms that prior to the time of the procedure, I have explained to the patient and/or his legal representative, the risks and benefits involved in the proposed treatment and any reasonable alternative to the proposed treatment.  I have

## (undated) NOTE — LETTER
June 7, 2019         Melinda Gong MD  [address], [place] [address]  [address] [facility]      Patient: Bonilla Campos   YOB: 1970   Date of Visit: 6/7/2019       Dear Dr. Joy Thompson MD,    I saw your patient, Bonilla Campos, on 6/7/2019.  E

## (undated) NOTE — LETTER
2/8/2025              John Avery        [address]        [facility] [address]       To Whom It May Concern:    John Avery is currently under my medical care and may not return to work at this time.    Please excuse John for 1 day (2/9/2025).  He may return to work on 2/10/2025.  Activity is restricted as follow/s: none.    If you require additional information please contact our office.        Sincerely,    HANNY Ordoñez, MARIS-BC

## (undated) NOTE — Clinical Note
May 2, 2017         Alec Davis MD  [address]      Patient: Laron Dominguez   YOB: 1970   Date of Visit: 5/1/2017       Dear Dr. Tessa Wick:    Thank you for the kind referral of Mr. Jacki Yao.  As you know per day, Crestor 20 mg per day, Prandin 1 mg t.i.d. PHYSICAL EXAMINATION: On exam, the patient is a well nourished  male. Pressure 150/85, pulse 61. Height 5 feet 10 inches, weight 251, BMI 36. Skin clear and moist. Pupils equal and reactive.  Ryan Kumar etc. on him and he will see me back in about 6 weeks. I will keep you posted of his progress. Hopefully we can avoid dialysis in him. Thank you very much, Dr. Krishan Miranda, for the referral of Kathy Phan.  I will do my best job to keep him with stable renal

## (undated) NOTE — LETTER
11/11/2022          To Whom It May Concern:    Cynthia Hollis is currently under my medical care. He may return to work with the following restrictions: no use of right arm. No lifting, pushing, pulling. These restrictions are in place for 4 weeks. If you require additional information please contact our office.         Sincerely,    Jania Espinoza MD

## (undated) NOTE — MR AVS SNAPSHOT
[facility]  [address] [place] [address] [phone]               Thank you for choosing us for your health care visit with Anisha Edmonds. Chung Riley MD.  We are glad to serve you and happy to provide you with this summary of your visit. Allergies as of May 01, 2017     No Known Allergies                Today's Vital Signs     BP Pulse Height Weight BMI    155/82 mmHg 61 5' 10\" (1.778 m) 251 lb (113.853 kg) 36.01 kg/m2         Current Medications          This list is accurate as of: 5/1/ Where to Get Your Medications      These medications were sent to [address] [facility],[address], [phone], [phone]  [address] [place] [address], [address] [place] [id number]    Hours:  24-hours Phone:  [phone]

## (undated) NOTE — MR AVS SNAPSHOT
[address]  [address] [facility]  [phone]               Thank you for choosing us for your health care visit with Teri Rene MD.  We are glad to serve you and happy to provide you with this summ Commonly known as:  JARDIANCE           furosemide 20 MG Tabs   Take 1 tablet (20 mg total) by mouth daily.    Commonly known as:  LASIX           GLUCOSAMINE CHONDROITIN ADV Tabs   Take 2 Tabs po Q day           Naftifine HCl 1 % Crea   Apply to affected a Your unique [employer] Access Code: Leigha Bauman  Expires: 2/17/2017  4:06 PM    If you have questions, you can call [phone] to talk to our [employer]. Remember, [employer] is NOT to be used for urgent needs. For medical emergencies, dial 911.

## (undated) NOTE — LETTER
1/20/2022              Flaca Fuentes        [address] [place] [id number]*         To Whom it may concern:     This is to certify that Flaca Fuentes had an appointment on 1/20/2022 at 9:45 AM with Tonya Piña MD.

## (undated) NOTE — LETTER
2/14/2025          To Whom It May Concern:    John Avery is currently under my medical care.     Activity is restricted as follows: no lifting.    If you require additional information please contact our office.        Sincerely,    HANNY Ordoñez          Document generated by:  Viviane MONTIEL RN

## (undated) NOTE — MR AVS SNAPSHOT
[address]  [address] [facility]  [phone]               Thank you for choosing us for your health care visit with Madiha Mena MD.  We are glad to serve you and happy to provide you with this summ Allergies as of Apr 29, 2017     No Known Allergies                Today's Vital Signs     BP Pulse Temp Weight          140/80 mmHg 65 98.3 °F (36.8 °C) (Oral) 241 lb (109.317 kg)           Current Medications          This list is accurate as of: 4/29/17 not sign up before the expiration date, you must request a new code. Your unique [facility] Access Code: [email or web address]  Expires: 6/14/2017  1:05 PM    If you have questions, you can call [phone] to talk to our [facility] Staff.  Remember, [facility]

## (undated) NOTE — MR AVS SNAPSHOT
[address]  [address] [facility]  [phone]               Thank you for choosing us for your health care visit with Andreea Willis MD.  We are glad to serve you and happy to provide you with this summ [address]   Phone:  [phone]   Fax:  [phone]    Diagnoses:  Uncontrolled type 2 diabetes mellitus with other diabetic kidney complication [facility])   Order:  Ophthalmology - Internal    Mayela Jack MD   6548 If you are confident that your benefit plan will not require a referral or authorization, such as [place] Medicaid, please feel free to schedule your appointment immediately.  However, if you are unsure about the requirements for authorization, please wait Commonly known as:  LASIX           GLUCOSAMINE CHONDROITIN ADV Tabs   Take 2 Tabs po Q day           Naftifine HCl 1 % Crea   Apply to affected area(s) BID.    Commonly known as:  NAFTIN           Repaglinide 1 MG Tabs   Take 1 tablet (1 mg total) by mouth

## (undated) NOTE — LETTER
2/13/2025              John Avery        [address]        [facility] [address]         To Whom it may concern:    This is to certify that John Avery had an appointment on 2/13/2025 at 2:31 PM with SCHUYLER TAM MD.        Sincerely,       SCHUYLER TAM MD  [address] [address] [phone]        Document electronically generated by:  SCHUYLER TAM MD

## (undated) NOTE — MR AVS SNAPSHOT
[facility]  [address] [place] [address] [phone]               Thank you for choosing us for your health care visit with Shaunna Fall. Dontrell Chester MD.  We are glad to serve you and happy to provide you with this summary of your visit. lisinopril 20 MG Tabs   Take 1 tablet (20 mg total) by mouth daily. Commonly known as:  PRINIVIL,ZESTRIL           Repaglinide 1 MG Tabs   Take 1 tablet (1 mg total) by mouth 3 (three) times daily before meals.    Commonly known as:  [address] [place] [address]

## (undated) NOTE — LETTER
October 9, 2017         Harshil Byrd MD  [address]      Patient: Emily Sharp   YOB: 1970   Date of Visit: 9/18/2017     Dear Dr. Erin Skaggs:     I saw Emily Sharp on 9/18/2017.  As you know, he is a

## (undated) NOTE — LETTER
8/16/2017              [address] [facility]         Dear Chichi Eli,    It was a pleasure to see you. Your ultrasound was normal.  There is no need for further testing at this time.   I look forward to seeing you at yo

## (undated) NOTE — LETTER
10/24/2018              [address] [address]         Dear Nash Dyer,    Our records indicate that the lab tests ordered for you by Yoselin Gomez. Lyndsey Soto MD  have not been done.   If you have, in fact, already compl

## (undated) NOTE — LETTER
[facility], [place]  Authorization for Invasive Procedures  1. I hereby authorize  Dr. Venancio Razo , my physician and whomever may be designated as the doctor's assistant, to perform the following operation and/or procedure:   Vaishali Fields performed for the purposes of advancing medicine, science, and/or education, provided my identity is not revealed. If the procedure has been videotaped, the physician/surgeon will obtain the original videotape.  The hospital will not be responsible for stor My signature below affirms that prior to the time of the procedure, I have explained to the patient and/or his legal representative, the risks and benefits involved in the proposed treatment and any reasonable alternative to the proposed treatment.  I have

## (undated) NOTE — MR AVS SNAPSHOT
[address]  [address] [facility]  [phone]               Thank you for choosing us for your health care visit with Teri Hatfield MD.  We are glad to serve you and happy to provide you with this summ furosemide 20 MG Tabs   Take 1 tablet (20 mg total) by mouth daily. Commonly known as:  LASIX           GLUCOSAMINE CHONDROITIN ADV Tabs   Take 2 Tabs po Q day           Naftifine HCl 1 % Crea   Apply to affected area(s) BID.    Commonly known as: Educational Information     Your blood pressure indicates you may be at-risk for Hypertension. Please consider the following Lifestyle Modifications. Also, please return for a follow-up Blood Pressure Check in 1 month.      Lifestyle Modification Recomme

## (undated) NOTE — LETTER
[address], [place]  Authorization for Invasive Procedures  1.  I hereby authorize  Dr. Matilde Toussaint , my physician and whomever may be designated as the doctor's assistant, to perform the following operation and/or procedure Renal Angiog performed for the purposes of advancing medicine, science, and/or education, provided my identity is not revealed. If the procedure has been videotaped, the physician/surgeon will obtain the original videotape.  The hospital will not be responsible for stor My signature below affirms that prior to the time of the procedure, I have explained to the patient and/or his legal representative, the risks and benefits involved in the proposed treatment and any reasonable alternative to the proposed treatment.  I have

## (undated) NOTE — LETTER
2/8/2025        To Whom It May Concern:    John Avery is currently under my medical care and may not return to school at this time.    Please excuse John for 1 day (2/9/2025).  He may return to work on 2/10/2025.  Activity is restricted as follow/s: none.    If you require additional information please contact our office.        Sincerely,    HANNY Ordoñez, FNP-BC          Document generated by:  HANNY Ordoñez

## (undated) NOTE — LETTER
November 21, 2017         Norma Reyna MD  [address] [address]  [address]      Patient: Ever Mahan   YOB: 1970   Date of Visit: 11/20/2017     Dear Radha Miller:    A followup on Rosa Woods; he has been trying much harder.  Hi Document electronically generated by:  Suyapa Argueta on 11/21/2017

## (undated) NOTE — Clinical Note
June 14, 2017         Juana Alonso MD  [address] [facility] [address]  [address] [facility]      Patient: Martell Hutton   YOB: 1970   Date of Visit: 6/12/2017     Dear Dr. Howard Ashford:    I followed up with Kianajayashree Brennen.  As you know, he is diab